# Patient Record
Sex: FEMALE | Race: BLACK OR AFRICAN AMERICAN | NOT HISPANIC OR LATINO | Employment: OTHER | URBAN - METROPOLITAN AREA
[De-identification: names, ages, dates, MRNs, and addresses within clinical notes are randomized per-mention and may not be internally consistent; named-entity substitution may affect disease eponyms.]

---

## 2017-02-24 ENCOUNTER — HOSPITAL ENCOUNTER (EMERGENCY)
Facility: HOSPITAL | Age: 57
Discharge: HOME | End: 2017-02-24
Attending: EMERGENCY MEDICINE
Payer: OTHER GOVERNMENT

## 2017-02-24 VITALS
HEART RATE: 96 BPM | BODY MASS INDEX: 32.65 KG/M2 | DIASTOLIC BLOOD PRESSURE: 68 MMHG | SYSTOLIC BLOOD PRESSURE: 133 MMHG | HEIGHT: 67 IN | OXYGEN SATURATION: 99 % | WEIGHT: 208 LBS | RESPIRATION RATE: 16 BRPM | TEMPERATURE: 99 F

## 2017-02-24 DIAGNOSIS — R00.2 PALPITATIONS: ICD-10-CM

## 2017-02-24 DIAGNOSIS — J01.90 ACUTE NON-RECURRENT SINUSITIS, UNSPECIFIED LOCATION: Primary | ICD-10-CM

## 2017-02-24 DIAGNOSIS — R05.9 COUGH: ICD-10-CM

## 2017-02-24 LAB
ALBUMIN SERPL BCP-MCNC: 3.8 G/DL
ALP SERPL-CCNC: 46 U/L
ALT SERPL W/O P-5'-P-CCNC: 32 U/L
ANION GAP SERPL CALC-SCNC: 10 MMOL/L
ANISOCYTOSIS BLD QL SMEAR: SLIGHT
AST SERPL-CCNC: 24 U/L
BASOPHILS # BLD AUTO: 0 K/UL
BASOPHILS NFR BLD: 0.1 %
BILIRUB SERPL-MCNC: 0.3 MG/DL
BNP SERPL-MCNC: 13 PG/ML
BUN SERPL-MCNC: 12 MG/DL
CALCIUM SERPL-MCNC: 8.7 MG/DL
CHLORIDE SERPL-SCNC: 107 MMOL/L
CO2 SERPL-SCNC: 21 MMOL/L
CREAT SERPL-MCNC: 0.7 MG/DL
DIFFERENTIAL METHOD: ABNORMAL
EOSINOPHIL # BLD AUTO: 0.1 K/UL
EOSINOPHIL NFR BLD: 1.8 %
ERYTHROCYTE [DISTWIDTH] IN BLOOD BY AUTOMATED COUNT: 15.2 %
EST. GFR  (AFRICAN AMERICAN): >60 ML/MIN/1.73 M^2
EST. GFR  (NON AFRICAN AMERICAN): >60 ML/MIN/1.73 M^2
GLUCOSE SERPL-MCNC: 106 MG/DL
HCT VFR BLD AUTO: 39.2 %
HGB BLD-MCNC: 12.1 G/DL
INR PPP: 1.1
LYMPHOCYTES # BLD AUTO: 0.7 K/UL
LYMPHOCYTES NFR BLD: 14.7 %
MCH RBC QN AUTO: 20 PG
MCHC RBC AUTO-ENTMCNC: 30.8 %
MCV RBC AUTO: 65 FL
MONOCYTES # BLD AUTO: 0.4 K/UL
MONOCYTES NFR BLD: 7.5 %
NEUTROPHILS # BLD AUTO: 3.5 K/UL
NEUTROPHILS NFR BLD: 75.9 %
OVALOCYTES BLD QL SMEAR: ABNORMAL
PLATELET # BLD AUTO: 200 K/UL
PLATELET BLD QL SMEAR: ABNORMAL
PMV BLD AUTO: 9.2 FL
POIKILOCYTOSIS BLD QL SMEAR: SLIGHT
POLYCHROMASIA BLD QL SMEAR: ABNORMAL
POTASSIUM SERPL-SCNC: 3.7 MMOL/L
PROT SERPL-MCNC: 6.9 G/DL
PROTHROMBIN TIME: 11.6 SEC
RBC # BLD AUTO: 6.02 M/UL
SODIUM SERPL-SCNC: 138 MMOL/L
TROPONIN I SERPL DL<=0.01 NG/ML-MCNC: 0.02 NG/ML
TSH SERPL DL<=0.005 MIU/L-ACNC: 1.08 UIU/ML
WBC # BLD AUTO: 4.7 K/UL

## 2017-02-24 PROCEDURE — 96374 THER/PROPH/DIAG INJ IV PUSH: CPT

## 2017-02-24 PROCEDURE — 36415 COLL VENOUS BLD VENIPUNCTURE: CPT

## 2017-02-24 PROCEDURE — 25000003 PHARM REV CODE 250: Performed by: EMERGENCY MEDICINE

## 2017-02-24 PROCEDURE — 84443 ASSAY THYROID STIM HORMONE: CPT

## 2017-02-24 PROCEDURE — 83880 ASSAY OF NATRIURETIC PEPTIDE: CPT

## 2017-02-24 PROCEDURE — 93005 ELECTROCARDIOGRAM TRACING: CPT

## 2017-02-24 PROCEDURE — 85025 COMPLETE CBC W/AUTO DIFF WBC: CPT

## 2017-02-24 PROCEDURE — 84484 ASSAY OF TROPONIN QUANT: CPT

## 2017-02-24 PROCEDURE — 80053 COMPREHEN METABOLIC PANEL: CPT

## 2017-02-24 PROCEDURE — 96361 HYDRATE IV INFUSION ADD-ON: CPT

## 2017-02-24 PROCEDURE — 63600175 PHARM REV CODE 636 W HCPCS: Performed by: EMERGENCY MEDICINE

## 2017-02-24 PROCEDURE — 85610 PROTHROMBIN TIME: CPT

## 2017-02-24 PROCEDURE — 99284 EMERGENCY DEPT VISIT MOD MDM: CPT | Mod: 25

## 2017-02-24 RX ORDER — ASPIRIN 81 MG/1
81 TABLET ORAL DAILY
COMMUNITY

## 2017-02-24 RX ORDER — DEXAMETHASONE SODIUM PHOSPHATE 4 MG/ML
8 INJECTION, SOLUTION INTRA-ARTICULAR; INTRALESIONAL; INTRAMUSCULAR; INTRAVENOUS; SOFT TISSUE
Status: COMPLETED | OUTPATIENT
Start: 2017-02-24 | End: 2017-02-24

## 2017-02-24 RX ORDER — AZITHROMYCIN 250 MG/1
250 TABLET, FILM COATED ORAL DAILY
Qty: 6 TABLET | Refills: 0 | Status: SHIPPED | OUTPATIENT
Start: 2017-02-24 | End: 2017-03-06

## 2017-02-24 RX ORDER — ACETAMINOPHEN 325 MG/1
650 TABLET ORAL
Status: COMPLETED | OUTPATIENT
Start: 2017-02-24 | End: 2017-02-24

## 2017-02-24 RX ORDER — IBUPROFEN 600 MG/1
600 TABLET ORAL EVERY 6 HOURS PRN
Qty: 20 TABLET | Refills: 0 | Status: SHIPPED | OUTPATIENT
Start: 2017-02-24 | End: 2017-03-24

## 2017-02-24 RX ORDER — ASPIRIN 325 MG
325 TABLET ORAL
Status: COMPLETED | OUTPATIENT
Start: 2017-02-24 | End: 2017-02-24

## 2017-02-24 RX ORDER — FLUTICASONE PROPIONATE 50 MCG
1 SPRAY, SUSPENSION (ML) NASAL 2 TIMES DAILY PRN
Qty: 15 G | Refills: 0 | Status: SHIPPED | OUTPATIENT
Start: 2017-02-24 | End: 2019-07-16

## 2017-02-24 RX ADMIN — ACETAMINOPHEN 650 MG: 325 TABLET ORAL at 07:02

## 2017-02-24 RX ADMIN — DEXAMETHASONE SODIUM PHOSPHATE 8 MG: 4 INJECTION, SOLUTION INTRAMUSCULAR; INTRAVENOUS at 07:02

## 2017-02-24 RX ADMIN — ASPIRIN 325 MG ORAL TABLET 325 MG: 325 PILL ORAL at 07:02

## 2017-02-24 RX ADMIN — SODIUM CHLORIDE 1000 ML: 0.9 INJECTION, SOLUTION INTRAVENOUS at 07:02

## 2017-02-24 NOTE — ED AVS SNAPSHOT
[facility] [address] [place] [address]               Marie Salcido   2017  6:56 PM   ED    Description:  Female : 1960   Department:  [facility]           Your Care was Coordinated By:     Provider Role From To    See Ramsay MD Attending Provider 17 1910 --      Reason for Visit     Irregular Heart Beat           Diagnoses this Visit        Comments    Acute non-recurrent sinusitis, unspecified location    -  Primary     Palpitations         Cough           ED Disposition     ED Disposition Condition Comment    Discharge             To Do List           Follow-up Information     Schedule an appointment as soon as possible for a visit with Yannick Bell MD.    Specialty:  Family Medicine    Why:  As needed    Contact information:    [address] [facility] [id number] [phone]          Follow up with [facility].    Specialty:  Emergency Medicine    Why:  If symptoms worsen    Contact information:    [address] [place] [id number] [phone]       These Medications        Disp Refills Start End    fluticasone (FLONASE) 50 mcg/actuation nasal spray 15 g 0 2017     1 spray by Each Nare route 2 (two) times daily as needed for Rhinitis. - Each Nare    Pharmacy: [facility]/pharmacy #[id number] - [place]  [address] Ph #: [phone]       azithromycin (Z-VASQUEZ) 250 MG tablet 6 tablet 0 2017 3/6/2017    Take 1 tablet (250 mg total) by mouth once daily. Take first 2 tablets together, then 1 every day until finished. - Oral    Pharmacy: [facility]/pharmacy #[id number] - [place]  [address] Ph #: [phone]       ibuprofen (ADVIL,MOTRIN) 600 MG tablet 20 tablet 0 2017     Take 1 tablet (600 mg total) by mouth every 6 (six) hours as needed for Pain. - Oral    Pharmacy: [facility]/pharmacy #[id number] - [place]  [address] Ph #: [phone]         [facility]     [facility] -  Assistance  Registered nurses in the [facility] provide clinical advisement, health education, appointment booking, and other advisory services.  Call for this free service at [phone].             Medications           Message regarding Medications     Verify the changes and/or additions to your medication regime listed below are the same as discussed with your clinician today.  If any of these changes or additions are incorrect, please notify your healthcare provider.        START taking these NEW medications        Refills    fluticasone (FLONASE) 50 mcg/actuation nasal spray 0    Si spray by Each Nare route 2 (two) times daily as needed for Rhinitis.    Class: Print    Route: Each Nare    azithromycin (Z-VASQUEZ) 250 MG tablet 0    Sig: Take 1 tablet (250 mg total) by mouth once daily. Take first 2 tablets together, then 1 every day until finished.    Class: Print    Route: Oral    ibuprofen (ADVIL,MOTRIN) 600 MG tablet 0    Sig: Take 1 tablet (600 mg total) by mouth every 6 (six) hours as needed for Pain.    Class: Print    Route: Oral      These medications were administered today        Dose Freq    aspirin tablet 325 mg 325 mg ED 1 Time    Sig: Take 1 tablet (325 mg total) by mouth ED 1 Time.    Class: Normal    Route: Oral    dexamethasone injection 8 mg 8 mg ED 1 Time    Sig: Inject 2 mLs (8 mg total) into the vein ED 1 Time.    Class: Normal    Route: Intravenous    sodium chloride 0.9% bolus 1,000 mL 1,000 mL ED 1 Time    Sig: Inject 1,000 mLs into the vein ED 1 Time.    Class: Normal    Route: Intravenous    acetaminophen tablet 650 mg 650 mg ED 1 Time    Sig: Take 2 tablets (650 mg total) by mouth ED 1 Time.    Class: Normal    Route: Oral      STOP taking these medications     cyclobenzaprine (FLEXERIL) 10 MG tablet Take 1 tablet (10 mg total) by mouth 3 (three) times daily as needed for Muscle spasms.    meclizine (ANTIVERT) 50 MG  tablet Take 1 tablet (50 mg total) by mouth 3 (three) times daily as needed for Dizziness.           Verify that the below list of medications is an accurate representation of the medications you are currently taking.  If none reported, the list may be blank. If incorrect, please contact your healthcare provider. Carry this list with you in case of emergency.           Current Medications     aspirin (ECOTRIN) 81 MG EC tablet Take 81 mg by mouth once daily.    aspirin tablet 325 mg Take 1 tablet (325 mg total) by mouth ED 1 Time.    atorvastatin (LIPITOR) 20 MG tablet Take 1 tablet (20 mg total) by mouth once daily.    azithromycin (Z-VASQUEZ) 250 MG tablet Take 1 tablet (250 mg total) by mouth once daily. Take first 2 tablets together, then 1 every day until finished.    fluticasone (FLONASE) 50 mcg/actuation nasal spray 1 spray by Each Nare route 2 (two) times daily as needed for Rhinitis.    ibuprofen (ADVIL,MOTRIN) 600 MG tablet Take 1 tablet (600 mg total) by mouth every 6 (six) hours as needed for Pain.           Clinical Reference Information           Your Vitals Were     BP                   164/86           Allergies as of 2/24/2017        Reactions    Codeine Rash      Immunizations Administered on Date of Encounter - 2/24/2017     None      ED Micro, Lab, POCT     Start Ordered       Status Ordering Provider    02/24/17 1912 02/24/17 1911  TSH  STAT      Final result     02/24/17 1911 02/24/17 1911  CBC auto differential  STAT      Final result     02/24/17 1911 02/24/17 1911  Comprehensive metabolic panel  STAT      Final result     02/24/17 1911 02/24/17 1911  Protime-INR  STAT      Final result     02/24/17 1911 02/24/17 1911    Now then every 3 hours,   Status:  Canceled     Comments:  PLEASE REVIEW ORDER START TIME BEFORE MARKING SPECIMEN COLLECTED.   Start Status   02/24/17 1911 Final result       Canceled     02/24/17 1911 02/24/17 1911  B-Type natriuretic peptide (BNP)  STAT      Final result        ED Imaging Orders     Start Ordered       Status Ordering Provider    02/24/17 1911 02/24/17 1911  X-Ray Chest PA And Lateral  1 time imaging      In process         Discharge Instructions         Acute Bacterial Rhinosinusitis (ABRS)  Acute bacterial rhinosinusitis (ABRS) is an infection of your nasal cavity and sinuses. Its caused by bacteria. Acute means that youve had symptoms for less than 12 weeks.  Understanding your sinuses  The nasal cavity is the large air-filled space behind your nose. The sinuses are a group of spaces formed by the bones of your face. They connect with your nasal cavity. ABRS causes the tissue lining these spaces to become inflamed. Mucus may not drain normally. This leads to facial pain and other symptoms.  What causes ABRS?  ABRS most often follows an upper respiratory infection caused by a virus. Bacteria then infect the lining of your nasal cavity and sinuses. But you can also get ABRS if you have:  · Nasal allergies  · Long-term nasal swelling and congestion not caused by allergies  · Blockage in the nose  Symptoms of ABRS  The symptoms of ABRS may be different for each person, and can include:  · Nasal congestion  · Runny nose  · Fluid draining from the nose down the throat (postnasal drip)  · Headache  · Cough  · Pain in the sinuses  · Thick, colored fluid from the nose (mucus)  · Fever  Diagnosing ABRS  ABRS may be diagnosed if youve had an upper respiratory infection like a cold and cough for longer than 10 to 14 days. Your health care provider will ask about your symptoms and your medical history. The provider will check your vital signs, including your temperature. Youll have a physical exam. The health care provider will check your ears, nose, and throat. You likely wont need any tests. If ABRS comes back, you may have a culture or other tests.  Treatment for ABRS  Treatment may include:  · Antibiotic medicine. This is for symptoms that last for at least 10 to 14  days.  · Nasal corticosteroid medicine. Drops or spray used in the nose can lessen swelling and congestion.  · Over-the-counter pain medicine. This is to lessen sinus pain and pressure.  · Nasal decongestant medicine. Spray or drops may help to lessen congestion. Do not use them for more than a few days.  · Salt wash (saline irrigation). This can help to loosen mucus.  Possible complications of ABRS  ABRS may come back or become long-term (chronic).  In rare cases, ABRS may cause complications such as:   · Inflamed tissue around the brain and spinal cord (meningitis)  · Inflamed tissue around the eyes (orbital cellulitis)  · Inflamed bones around the sinuses (osteitis)  These problems may need to be treated in a hospital with intravenous (IV) antibiotic medicine or surgery.  When to call the health care provider  Call your health care provider if you have any of the following:  · Symptoms that dont get better, or get worse  · Symptoms that dont get better after 3 to 5 days on antibiotics  · Trouble seeing  · Swelling around your eyes  · Confusion or trouble staying awake   Date Last Reviewed: 3/3/2015  © 7574-6956 [employer]. [address] [place] [address]. All rights reserved. This information is not intended as a substitute for professional medical care. Always follow your healthcare professional's instructions.          Discharge References/Attachments     PALPITATIONS (ENGLISH)      Smoking Cessation     If you would like to quit smoking:   You may be eligible for free services if you are a [place] resident and started smoking cigarettes before September 1, 1988.  Call the Smoking Cessation Trust (SCT) toll free at (764) 121-6588 or (454) 396-5295.   Call 4-800-QUIT-NOW if you do not meet the above criteria.             [facility] complies with applicable Federal civil rights laws and does not discriminate on the basis of race, color, national origin, age,  disability, or sex.        Language Assistance Services     ATTENTION: Language assistance services are available, free of charge. Please call [phone].      ATENCIÓN: Si habla paigeañol, tiene a parker disposición servicios gratuitos de asistencia lingüística. Llame al [phone].     CHÚ Ý: N?u b?n nói Ti?ng Vi?t, có các d?ch v? h? tr? ngôn ng? mi?n phí dành cho b?n. G?i s? [phone].

## 2017-02-25 NOTE — ED NOTES
Pt continues with condition unchanged. Continuously wiping nose, lots of nasal congestion. Face hurts from pressure

## 2017-02-25 NOTE — ED NOTES
Pt complains of strange feeling in her chest. Heart feels like it is fluttering and flipping. Slight shortness of breath. Having lots of nasal and head congestion. Has been sick for about 3 days now. Heart started feeling funny today. Denies chest pain. Pt on vitals and cardiac monitor. Neuro intact. No noted cyanosis. Alert and oriented. NSR on monitor. Occasional PVC noted.

## 2017-02-25 NOTE — ED PROVIDER NOTES
"Encounter Date: 2/24/2017    SCRIBE #1 NOTE: I, Joya Moran, am scribing for, and in the presence of, Dr Ramsay.       History     Chief Complaint   Patient presents with    Irregular Heart Beat     states it feels like "heart is flipping around in chest" with tightness; sinus congestion     Review of patient's allergies indicates:   Allergen Reactions    Codeine Rash     HPI Comments: 02/24/2017  7:36 PM     Chief Complaint: Irregular Heart Beat      Marie Salcido is a 56 y.o. female with a pmhx of Anemia; Coronary artery disease; Silent myocardial infarction; and Sleep apnea presenting to the E.D. with an acute onset of palpitations which began tonight. She describes her heart "flipping around in my chest." One week ago she began experiencing congestion, chills, fatigue, myalgias, and cough. No exacerbating or alleviating factors. Denies fever. Pt has a past surgical history that includes cyst removed from right breast; Tubal ligation; Laser laparoscopy; and Coronary artery bypass graft (10/4/13).\      The history is provided by the patient.     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 [facility]rino [facility]    cyst removed from right breast      LASER LAPAROSCOPY      TUBAL LIGATION       Family History   Problem Relation Age of Onset    Cancer Maternal Grandmother     Breast cancer Sister     Asthma Sister     Arrhythmia Mother     Diabetes Mellitus Neg Hx      Social History   Substance Use Topics    Smoking status: Current Every Day Smoker    Smokeless tobacco: None    Alcohol use 0.6 oz/week     1 Glasses of wine per week     Review of Systems   Constitutional: Positive for chills and fatigue. Negative for " fever.   HENT: Positive for congestion. Negative for sore throat.    Eyes: Negative for visual disturbance.   Respiratory: Positive for cough.    Cardiovascular: Positive for palpitations. Negative for chest pain.   Gastrointestinal: Negative for abdominal pain, diarrhea, nausea and vomiting.   Genitourinary: Negative for difficulty urinating and pelvic pain.   Musculoskeletal: Positive for myalgias. Negative for arthralgias.   Skin: Negative for rash.   Neurological: Negative for weakness.       Physical Exam   Initial Vitals   BP Pulse Resp Temp SpO2   02/24/17 1852 02/24/17 1852 02/24/17 1852 02/24/17 1852 02/24/17 1852   159/91 113 16 99 °F (37.2 °C) 98 %     Physical Exam    Nursing note and vitals reviewed.  Constitutional: She appears well-developed.   HENT:   Head: Normocephalic and atraumatic.   Nose: Left sinus exhibits maxillary sinus tenderness.   Mouth/Throat: Oropharynx is clear and moist.   Eyes: Conjunctivae are normal. Scleral icterus is present.   Neck: Neck supple.   Cardiovascular: Regular rhythm, normal heart sounds and intact distal pulses. Tachycardia present.  Exam reveals no gallop and no friction rub.    No murmur heard.  Pulmonary/Chest: Breath sounds normal. She has no wheezes. She has no rhonchi. She has no rales.   Abdominal: Soft. She exhibits no distension. There is no tenderness.   Musculoskeletal: Normal range of motion.   Neurological: She is alert and oriented to person, place, and time.   Skin: No rash noted. No erythema.   Psychiatric: She has a normal mood and affect.         ED Course   Procedures  Labs Reviewed   CBC W/ AUTO DIFFERENTIAL - Abnormal; Notable for the following:        Result Value    RBC 6.02 (*)     MCV 65 (*)     MCH 20.0 (*)     MCHC 30.8 (*)     RDW 15.2 (*)     Lymph # 0.7 (*)     Gran% 75.9 (*)     Lymph% 14.7 (*)     All other components within normal limits    Narrative:     PLEASE REVIEW ORDER START TIME BEFORE MARKING SPECIMEN  COLLECTED.    COMPREHENSIVE METABOLIC PANEL - Abnormal; Notable for the following:     CO2 21 (*)     Alkaline Phosphatase 46 (*)     All other components within normal limits    Narrative:     PLEASE REVIEW ORDER START TIME BEFORE MARKING SPECIMEN  COLLECTED.   PROTIME-INR    Narrative:     PLEASE REVIEW ORDER START TIME BEFORE MARKING SPECIMEN  COLLECTED.   TROPONIN I    Narrative:     PLEASE REVIEW ORDER START TIME BEFORE MARKING SPECIMEN  COLLECTED.   B-TYPE NATRIURETIC PEPTIDE    Narrative:     PLEASE REVIEW ORDER START TIME BEFORE MARKING SPECIMEN  COLLECTED.   TSH    Narrative:     PLEASE REVIEW ORDER START TIME BEFORE MARKING SPECIMEN  COLLECTED.     EKG Readings: (Independently Interpreted)   Rhythm: Sinus Tachycardia. Heart Rate: 102.   EKG: sinus tachycardia at 102 bpm, nl axis, nl intervals, no hypertrophy, no ST-T changes as read by me.        X-Rays:   Independently Interpreted Readings:   Chest X-Ray: No acute abnormalities. Prior sternotomy.     Medical Decision Making:   History:   Old Medical Records: I decided to obtain old medical records.  Initial Assessment:   56-year-old woman who presents for evaluation of sinus pressure, postnasal drip, cough with associated chest pressure and general malaise and body aches.  Afebrile and nontoxic appearing with mild sinus tenderness to palpation.  No pharyngeal erythema or exudate.  Mild tachycardia on examination initially which improved with IV fluids.  She had no grave electrolyte abnormalities or evidence of dehydration significantly.  BNP negative.  TSH and troponin normal.  Out ACS, thyroid storm.  She seems to have a upper respiratory infection concerning for sinusitis.  EKG showed no evidence of significant arrhythmias.  Informed her she should follow up with her PCP for Holter monitor.  Return precautions discussed.  She is discharged improved in no acute distress with a prescription for azithromycin and Flonase.            Scribe Attestation:   Scribe #1: I  performed the above scribed service and the documentation accurately describes the services I performed. I attest to the accuracy of the note.    Attending Attestation:           Physician Attestation for Scribe:  Physician Attestation Statement for Scribe #1: I, Dr Ramsay, reviewed documentation, as scribed by Joya Moran in my presence, and it is both accurate and complete.                 ED Course     Clinical Impression:   The primary encounter diagnosis was Acute non-recurrent sinusitis, unspecified location. Diagnoses of Palpitations and Cough were also pertinent to this visit.          See Ramsay MD  02/24/17 2035

## 2017-02-25 NOTE — DISCHARGE INSTRUCTIONS

## 2017-03-21 ENCOUNTER — HOSPITAL ENCOUNTER (EMERGENCY)
Facility: HOSPITAL | Age: 57
Discharge: OTHER INSTITUTION | End: 2017-03-21
Attending: EMERGENCY MEDICINE
Payer: OTHER GOVERNMENT

## 2017-03-21 VITALS
TEMPERATURE: 98 F | RESPIRATION RATE: 16 BRPM | HEIGHT: 67 IN | SYSTOLIC BLOOD PRESSURE: 146 MMHG | DIASTOLIC BLOOD PRESSURE: 85 MMHG | BODY MASS INDEX: 33.43 KG/M2 | OXYGEN SATURATION: 100 % | WEIGHT: 213 LBS | HEART RATE: 60 BPM

## 2017-03-21 DIAGNOSIS — R07.9 CHEST PAIN, UNSPECIFIED TYPE: Primary | ICD-10-CM

## 2017-03-21 PROBLEM — I20.0 UNSTABLE ANGINA: Status: ACTIVE | Noted: 2017-03-21

## 2017-03-21 PROBLEM — I20.0 UNSTABLE ANGINA: Status: RESOLVED | Noted: 2017-03-21 | Resolved: 2017-03-21

## 2017-03-21 PROBLEM — Z72.0 TOBACCO USE: Status: ACTIVE | Noted: 2017-03-21

## 2017-03-21 LAB
ANION GAP SERPL CALC-SCNC: 8 MMOL/L
BASOPHILS # BLD AUTO: 0.3 K/UL
BASOPHILS NFR BLD: 5.6 %
BUN SERPL-MCNC: 13 MG/DL
CALCIUM SERPL-MCNC: 8.5 MG/DL
CHLORIDE SERPL-SCNC: 108 MMOL/L
CO2 SERPL-SCNC: 22 MMOL/L
CREAT SERPL-MCNC: 0.7 MG/DL
DIFFERENTIAL METHOD: ABNORMAL
EOSINOPHIL # BLD AUTO: 0.1 K/UL
EOSINOPHIL NFR BLD: 2.1 %
ERYTHROCYTE [DISTWIDTH] IN BLOOD BY AUTOMATED COUNT: 14.9 %
EST. GFR  (AFRICAN AMERICAN): >60 ML/MIN/1.73 M^2
EST. GFR  (NON AFRICAN AMERICAN): >60 ML/MIN/1.73 M^2
GLUCOSE SERPL-MCNC: 92 MG/DL
HCT VFR BLD AUTO: 39.9 %
HGB BLD-MCNC: 12.2 G/DL
LYMPHOCYTES # BLD AUTO: 2 K/UL
LYMPHOCYTES NFR BLD: 38.5 %
MCH RBC QN AUTO: 19.8 PG
MCHC RBC AUTO-ENTMCNC: 30.6 %
MCV RBC AUTO: 65 FL
MONOCYTES # BLD AUTO: 0.3 K/UL
MONOCYTES NFR BLD: 6.1 %
NEUTROPHILS # BLD AUTO: 2.5 K/UL
NEUTROPHILS NFR BLD: 47.7 %
PLATELET # BLD AUTO: 279 K/UL
PMV BLD AUTO: 9.3 FL
POTASSIUM SERPL-SCNC: 4.3 MMOL/L
RBC # BLD AUTO: 6.17 M/UL
SODIUM SERPL-SCNC: 138 MMOL/L
TROPONIN I SERPL DL<=0.01 NG/ML-MCNC: 0.02 NG/ML
WBC # BLD AUTO: 5.3 K/UL

## 2017-03-21 PROCEDURE — 84484 ASSAY OF TROPONIN QUANT: CPT

## 2017-03-21 PROCEDURE — 63600175 PHARM REV CODE 636 W HCPCS: Performed by: EMERGENCY MEDICINE

## 2017-03-21 PROCEDURE — 25000003 PHARM REV CODE 250: Performed by: EMERGENCY MEDICINE

## 2017-03-21 PROCEDURE — 99285 EMERGENCY DEPT VISIT HI MDM: CPT | Mod: 25

## 2017-03-21 PROCEDURE — 36415 COLL VENOUS BLD VENIPUNCTURE: CPT

## 2017-03-21 PROCEDURE — 85025 COMPLETE CBC W/AUTO DIFF WBC: CPT

## 2017-03-21 PROCEDURE — 96374 THER/PROPH/DIAG INJ IV PUSH: CPT

## 2017-03-21 PROCEDURE — 96375 TX/PRO/DX INJ NEW DRUG ADDON: CPT

## 2017-03-21 PROCEDURE — 96376 TX/PRO/DX INJ SAME DRUG ADON: CPT

## 2017-03-21 PROCEDURE — 80048 BASIC METABOLIC PNL TOTAL CA: CPT

## 2017-03-21 RX ORDER — ONDANSETRON 2 MG/ML
4 INJECTION INTRAMUSCULAR; INTRAVENOUS
Status: COMPLETED | OUTPATIENT
Start: 2017-03-21 | End: 2017-03-21

## 2017-03-21 RX ORDER — MORPHINE SULFATE 2 MG/ML
7 INJECTION, SOLUTION INTRAMUSCULAR; INTRAVENOUS
Status: COMPLETED | OUTPATIENT
Start: 2017-03-21 | End: 2017-03-21

## 2017-03-21 RX ORDER — ASPIRIN 325 MG
325 TABLET ORAL
Status: COMPLETED | OUTPATIENT
Start: 2017-03-21 | End: 2017-03-21

## 2017-03-21 RX ORDER — MORPHINE SULFATE 2 MG/ML
6 INJECTION, SOLUTION INTRAMUSCULAR; INTRAVENOUS
Status: COMPLETED | OUTPATIENT
Start: 2017-03-21 | End: 2017-03-21

## 2017-03-21 RX ADMIN — ONDANSETRON 4 MG: 2 INJECTION INTRAMUSCULAR; INTRAVENOUS at 07:03

## 2017-03-21 RX ADMIN — ASPIRIN 325 MG ORAL TABLET 325 MG: 325 PILL ORAL at 06:03

## 2017-03-21 RX ADMIN — MORPHINE SULFATE 7 MG: 2 INJECTION, SOLUTION INTRAMUSCULAR; INTRAVENOUS at 05:03

## 2017-03-21 RX ADMIN — MORPHINE SULFATE 6 MG: 2 INJECTION, SOLUTION INTRAMUSCULAR; INTRAVENOUS at 06:03

## 2017-03-21 NOTE — ED PROVIDER NOTES
Chief complaint:  Breast pain (Pain under Rt breast )      HPI:  Marie Salcido is a 56 y.o. female presenting with right-sided chest pain present for slightly less than 24 hours.  It is constant in nature and worsening.  It is provoked by certain movements as well as deep inspiration.  She denies any dyspnea.  No associated diaphoresis, nausea, or vomiting.  No radiation or migration of pain.  She denies injury.  She notably does has history of coronary artery disease with prior CABG x 2.    ROS: As per HPI and below:  No headache, neck pain, syncope, hemoptysis, fever, cough, abdominal pain, vomiting, diarrhea, rashes, swelling, visual changes, numbness, weakness. The patient/family denies blurry vision, dysphagia, dysuria, joint swelling, easy bruising.      Review of patient's allergies indicates:   Allergen Reactions    Codeine Rash       Patient's Medications   New Prescriptions    No medications on file   Previous Medications    ASPIRIN (ECOTRIN) 81 MG EC TABLET    Take 81 mg by mouth once daily.    FLUTICASONE (FLONASE) 50 MCG/ACTUATION NASAL SPRAY    1 spray by Each Nare route 2 (two) times daily as needed for Rhinitis.    IBUPROFEN (ADVIL,MOTRIN) 600 MG TABLET    Take 1 tablet (600 mg total) by mouth every 6 (six) hours as needed for Pain.   Modified Medications    No medications on file   Discontinued Medications    ATORVASTATIN (LIPITOR) 20 MG TABLET    Take 1 tablet (20 mg total) by mouth once daily.       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 [facility]    cyst removed from right breast      LASER LAPAROSCOPY      TUBAL LIGATION         Social History     Social History     Marital status:      Spouse name: N/A    Number of children: N/A    Years of education: N/A     Social History Main Topics    Smoking status: Current Every Day Smoker    Smokeless tobacco: None    Alcohol use 0.6 oz/week     1 Glasses of wine per week    Drug use: No    Sexual activity: Yes     Partners: Male     Other Topics Concern    None     Social History Narrative       Family History   Problem Relation Age of Onset    Cancer Maternal Grandmother     Breast cancer Sister     Asthma Sister     Arrhythmia Mother     Diabetes Mellitus Neg Hx        Physical Exam:    Vitals:    03/21/17 0447   BP: (!) 147/72   Pulse: 71   Resp: 16   Temp: 98.3 °F (36.8 °C)     GENERAL:  No apparent distress.  Alert.    HEENT:  Moist mucous membranes.  Normocephalic and atraumatic.    NECK:  No swelling.  Midline trachea.   CARDIOVASCULAR:  Regular rate and rhythm.  2+ radial pulses.  No murmurs auscultated.  Chest wall pain reproducible with patient twisting of the torso.  No palpable tenderness to palpation or crepitus.  PULMONARY:  Lungs clear to auscultation bilaterally.  No wheezes, rales, or rhonci.    ABDOMEN:  Non-tender and non-distended.    EXTREMITIES:  Warm and well perfused.  Brisk capillary refill.  Symmetric and nontender.  No peripheral edema.  2+ DP and PT pulses.  NEUROLOGICAL:  Normal mental status.  Appropriate and conversant.    SKIN:  No rashes or ecchymoses.    BACK:  Atraumatic.  No CVA tenderness to palpation.      Labs Reviewed   CBC W/ AUTO DIFFERENTIAL   BASIC METABOLIC PANEL   TROPONIN I       Current Discharge Medication List      CONTINUE these medications which have NOT CHANGED    Details   aspirin (ECOTRIN) 81 MG EC tablet Take 81 mg by mouth once daily.      fluticasone (FLONASE) 50 mcg/actuation nasal spray 1 spray by Each Nare route 2 (two) times daily as needed for Rhinitis.  Qty: 15 g, Refills: 0      ibuprofen (ADVIL,MOTRIN) 600 MG tablet Take 1 tablet (600 mg total) by  mouth every 6 (six) hours as needed for Pain.  Qty: 20 tablet, Refills: 0         STOP taking these medications       atorvastatin (LIPITOR) 20 MG tablet Comments:   Reason for Stopping:               Orders Placed This Encounter   Procedures    X-Ray Chest PA And Lateral    CBC auto differential    Basic metabolic panel    Troponin I    Cardiac Monitoring - Adult    EKG 12-LEAD    Insert peripheral IV       Imaging Results     None          ED Course   Comment By Time   EKG:  Sinus bradycardia, rate of 59, normal intervals and axis.  Old T-wave inversions in V1/2 compared to prior Oct 2013.  There are no acute ST or T wave changes suggestive of acute ischemia or infarction. Jakob Greer MD 03/21 7609   Discussed with Dr. Tyson who recommends transfer for further workup given prior hx CABG.  Call placed to transfer center. Jakob Greer MD 03/21 0609   CXR:  NAD. (my read) Jakob Greer MD 03/21 6212         MDM:    56 y.o. female with atypical chest pain right-sided in nature.  Further workup was sent for risk stratification of ACS.  Patient without tachycardia or hypoxia and I doubt PE.  EKG nonischemic with further cardiac biomarkers for risk stratification sent.  Very low suspicion for aortic dissection.  Morphine IV given for pain control.  Cardiac biomarkers negative.  Patient awaiting txf accepted by hospital medicine at [facility].      Diagnoses:    1. Chest pain     Jakob Greer MD  03/21/17 0714

## 2017-03-21 NOTE — ED NOTES
#[phone]: Report called to BALDEMAR Bowles (Med-Surg @ [facility]) re: pending tx to their facility (direct admit to # [id number]).

## 2017-03-21 NOTE — ED NOTES
Patient identifiers for Marie Salcdio checked and correct.  LOC: Patient is awake, alert, and aware of environment with an appropriate affect. Patient is oriented x 3 and speaking appropriately.  APPEARANCE: Patient resting comfortably and in no acute distress. Patient is clean and well groomed, patient's clothing is properly fastened.  SKIN: The skin is warm and dry. Patient has normal skin turgor and moist mucus membrances. Skin is intact; no bruising or breakdown noted.  MUSCULOSKELETAL: Patient is moving all extremities well, no obvious deformities noted. Pulses intact.   RESPIRATORY: Airway is open and patent. Respirations are spontaneous and non-labored with normal effort and rate.  CARDIAC: Patient has a normal rate and rhythm. No peripheral edema noted. Capillary refill < 3 seconds. Right under breast pain onset yesterday, non radiating.   ABDOMEN: No distention noted. Bowel sounds active in all 4 quadrants. Soft and non-tender upon palpation.  NEUROLOGICAL: PERRL. Facial expression is symmetrical. Hand grasps are equal bilaterally. Normal sensation in all extremities when touched with finger.  Allergies reported:   Review of patient's allergies indicates:   Allergen Reactions    Codeine Rash

## 2017-03-22 PROBLEM — I30.0 IDIOPATHIC PERICARDITIS: Status: ACTIVE | Noted: 2017-03-22

## 2017-03-23 PROBLEM — K76.0 FATTY LIVER: Status: ACTIVE | Noted: 2017-03-23

## 2017-05-02 ENCOUNTER — OFFICE VISIT (OUTPATIENT)
Dept: CARDIOTHORACIC SURGERY | Facility: CLINIC | Age: 57
End: 2017-05-02
Payer: OTHER GOVERNMENT

## 2017-05-02 VITALS
HEIGHT: 67 IN | OXYGEN SATURATION: 100 % | HEART RATE: 64 BPM | SYSTOLIC BLOOD PRESSURE: 144 MMHG | DIASTOLIC BLOOD PRESSURE: 92 MMHG | WEIGHT: 209.63 LBS | BODY MASS INDEX: 32.9 KG/M2 | TEMPERATURE: 98 F

## 2017-05-02 DIAGNOSIS — R07.89 OTHER CHEST PAIN: Primary | ICD-10-CM

## 2017-05-02 PROCEDURE — 99999 PR PBB SHADOW E&M-EST. PATIENT-LVL III: CPT | Mod: PBBFAC,,, | Performed by: THORACIC SURGERY (CARDIOTHORACIC VASCULAR SURGERY)

## 2017-05-02 PROCEDURE — 99213 OFFICE O/P EST LOW 20 MIN: CPT | Mod: PBBFAC | Performed by: THORACIC SURGERY (CARDIOTHORACIC VASCULAR SURGERY)

## 2017-05-02 PROCEDURE — 99205 OFFICE O/P NEW HI 60 MIN: CPT | Mod: S$PBB,,, | Performed by: THORACIC SURGERY (CARDIOTHORACIC VASCULAR SURGERY)

## 2017-05-02 RX ORDER — LISINOPRIL 10 MG/1
10 TABLET ORAL DAILY
Status: ON HOLD | COMMUNITY
End: 2019-06-03 | Stop reason: SDUPTHER

## 2017-05-02 RX ORDER — IBUPROFEN 600 MG/1
600 TABLET ORAL 3 TIMES DAILY
Status: ON HOLD | COMMUNITY
End: 2019-06-03 | Stop reason: SDUPTHER

## 2017-05-02 RX ORDER — TRAZODONE HYDROCHLORIDE 100 MG/1
100 TABLET ORAL NIGHTLY
COMMUNITY
End: 2019-07-16

## 2017-05-02 RX ORDER — MECLIZINE HYDROCHLORIDE CHEWABLE TABLETS 25 MG/1
32 TABLET, CHEWABLE ORAL 3 TIMES DAILY PRN
COMMUNITY
End: 2019-07-16

## 2017-05-02 NOTE — LETTER
May 2, 2017      Pallavi Sunkara, MD  [address]  [facility] [address]           [facility]  [address]  [facility] [address]  Phone: [phone]          Patient: Marie Salcido   MR Number: [id number]   YOB: 1960   Date of Visit: 5/2/2017       Dear Dr. Pallavi Sunkara:    Thank you for referring Marie Salcido to me for evaluation. Attached you will find relevant portions of my assessment and plan of care.    If you have questions, please do not hesitate to call me. I look forward to following Marie Salcido along with you.    Sincerely,    Donnie Milligan MD    Enclosure  CC:  No Recipients    If you would like to receive this communication electronically, please contact [email or web address] or [phone] to request more information on [employer] Link access.    For providers and/or their staff who would like to refer a patient to [facility], please contact us through our one-stop-shop provider referral line, [facility] , at [phone].    If you feel you have received this communication in error or would no longer like to receive these types of communications, please e-mail [email or web address]

## 2017-05-02 NOTE — MR AVS SNAPSHOT
[facility]  [address]  [place] [address]  Phone: [phone]                  Marie Salcido   2017 10:00 AM   Appointment    Description:  Female : 1960   Provider:  Luke Posadas MD   Department:  [facility]                To Do List           Future Appointments        Provider Department Dept Phone    2017 10:00 AM Luke Posadas MD [facility] [phone]      Goals (5 Years of Data)     None      [facility]     [facility] -  Assistance  Unless otherwise directed by your provider, please contact [facility], our nurse care line that is available for  assistance.     Registered nurses in the [facility] Center provide: appointment scheduling, clinical advisement, health education, and other advisory services.  Call: [phone] (toll free)               Medications           Message regarding Medications     Verify the changes and/or additions to your medication regime listed below are the same as discussed with your clinician today.  If any of these changes or additions are incorrect, please notify your healthcare provider.             Verify that the below list of medications is an accurate representation of the medications you are currently taking.  If none reported, the list may be blank. If incorrect, please contact your healthcare provider. Carry this list with you in case of emergency.           Current Medications     aspirin (ECOTRIN) 81 MG EC tablet Take 81 mg by mouth once daily.    atorvastatin (LIPITOR) 40 MG tablet Take 1 tablet (40 mg total) by mouth once daily.    famotidine (PEPCID) 20 MG tablet Take 1 tablet (20 mg total) by mouth 2 (two) times daily.    fluticasone (FLONASE) 50 mcg/actuation nasal spray 1 spray by Each Nare route 2 (two) times daily as needed for Rhinitis.    hydrochlorothiazide (HYDRODIURIL) 25 MG tablet Take 1 tablet (25  mg total) by mouth once daily.           Clinical Reference Information           Your Vitals Were     Last Period                   10/04/2013           Allergies as of 5/2/2017     Codeine      Immunizations Administered on Date of Encounter - 5/2/2017     None      Smoking Cessation     If you would like to quit smoking:   You may be eligible for free services if you are a [place] resident and started smoking cigarettes before September 1, 1988.  Call the Smoking Cessation Trust ([facility]) toll free at ([phone] or [phone].   Call 1-800-QUIT-NOW if you do not meet the above criteria.   Contact us via email: [email or web address]   View our website for more information: [email or web address]        Language Assistance Services     ATTENTION: Language assistance services are available, free of charge. Please call [phone].      ATENCIÓN: Si karo sol, tiene a parker disposición servicios gratuitos de asistencia lingüística. Llame al [phone].     CHÚ Ý: N?u b?n nói Ti?ng Vi?t, có các d?ch v? h? tr? ngôn ng? mi?n phí dành cho b?n. G?i s? [phone].         [facility] complies with applicable Federal civil rights laws and does not discriminate on the basis of race, color, national origin, age, disability, or sex.

## 2017-05-02 NOTE — PROGRESS NOTES
Cardiothoracic Surgery Clinic Note - H and P    Subjective:     Procedures:      Marie Salcido is a 56 y.o. 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 Troponin negative. Echo preserved EF    Not smoking. Occasionally reports shortness of breath with exertion. Non-smoker.     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 Par[facility]    cyst removed from right breast      LASER LAPAROSCOPY      TUBAL LIGATION         Social History     Social History    Marital status:      Spouse name: N/A    Number of children: N/A    Years of education: N/A     Occupational History    Not on file.     Social History Main Topics    Smoking status: Current Every Day Smoker    Smokeless tobacco: Not on file    Alcohol use 0.6 oz/week     1 Glasses of wine per week    Drug use: No    Sexual activity: Yes     Partners: Male     Other Topics Concern    Not on file     Social History Narrative       Review of patient's allergies indicates:   Allergen Reactions    Codeine Rash         Objective:     PHYSICAL EXAM:  Vital Signs (Most Recent)       Review of Systems   Constitutional: Positive for malaise/fatigue. Negative for chills, fever and weight loss.   HENT: Negative.    Eyes: Negative.    Respiratory: Positive for shortness of breath. Negative for cough.    Cardiovascular: Positive for chest pain. Negative for palpitations and claudication.   Gastrointestinal: Negative.    Genitourinary:  Negative.    Musculoskeletal: Negative.    Skin: Negative.        Physical Exam:  General: NAD   Neuro: aaox4, perrl  Respiratory: resps even unlabored  Cardiac: cap refill <2 sec, RRR  Well healed previous sternotomy incision  Abdomen: Normal, benign.  Extremities: Warm dry and intact    Pathology- reviewed  Specimens     None            Assessment:     56 y.o. female with CAD s/p CABGx2 (LIMA-LAD, SVG-OM 2, due to anomalous left main w/ origin on right aortic cusp) in 2013 with Dr Posadas who developed an episode of acute chest pain in March 2017. Work up with [facility] showed atretic LIMA-LAD and close to 100% blockage of pLAD  Patient is a [employer]    Plan:     - No indications for surgical revascularization -- LCx is bypassed, LIMA-LAD is atretic but left main is open    Donnie Milligan MD   [facility]                  CTS Attending Note:    I have personally taken the history and examined this patient and agree with the resident's note as stated above. 55 yo F with hx CABG 2 for anomalous LM. Unroofing would have been a better option, but vessel diverged from aortic wall early and the intramural component was short, allowing for compression even with unroof. As a result, cabg done. SVG is patent and distal to a lesion, but LIMA is atretic given competitive flow through the native vessels. There is no role for redo CABG or PCI. If pt has recurrent chest pain (has only had one episode), then would recommend PET to eval for ischemia.

## 2018-08-24 ENCOUNTER — HOSPITAL ENCOUNTER (EMERGENCY)
Facility: HOSPITAL | Age: 58
Discharge: HOME | End: 2018-08-24
Attending: EMERGENCY MEDICINE
Payer: OTHER GOVERNMENT

## 2018-08-24 VITALS
SYSTOLIC BLOOD PRESSURE: 141 MMHG | HEIGHT: 67 IN | OXYGEN SATURATION: 100 % | BODY MASS INDEX: 32.65 KG/M2 | WEIGHT: 208 LBS | TEMPERATURE: 98 F | RESPIRATION RATE: 18 BRPM | DIASTOLIC BLOOD PRESSURE: 66 MMHG | HEART RATE: 73 BPM

## 2018-08-24 DIAGNOSIS — M54.16 LUMBAR RADICULOPATHY: Primary | ICD-10-CM

## 2018-08-24 PROCEDURE — 96372 THER/PROPH/DIAG INJ SC/IM: CPT

## 2018-08-24 PROCEDURE — 63600175 PHARM REV CODE 636 W HCPCS: Performed by: NURSE PRACTITIONER

## 2018-08-24 PROCEDURE — 99283 EMERGENCY DEPT VISIT LOW MDM: CPT | Mod: 25

## 2018-08-24 RX ORDER — PREDNISONE 20 MG/1
40 TABLET ORAL DAILY
Qty: 8 TABLET | Refills: 0 | Status: SHIPPED | OUTPATIENT
Start: 2018-08-24 | End: 2018-08-28

## 2018-08-24 RX ORDER — DICLOFENAC SODIUM 50 MG/1
50 TABLET, DELAYED RELEASE ORAL 3 TIMES DAILY PRN
Qty: 30 TABLET | Refills: 0 | Status: SHIPPED | OUTPATIENT
Start: 2018-08-24 | End: 2019-07-16

## 2018-08-24 RX ORDER — KETOROLAC TROMETHAMINE 30 MG/ML
60 INJECTION, SOLUTION INTRAMUSCULAR; INTRAVENOUS
Status: COMPLETED | OUTPATIENT
Start: 2018-08-24 | End: 2018-08-24

## 2018-08-24 RX ORDER — METHOCARBAMOL 750 MG/1
1500 TABLET, FILM COATED ORAL 3 TIMES DAILY PRN
Qty: 30 TABLET | Refills: 0 | Status: SHIPPED | OUTPATIENT
Start: 2018-08-24 | End: 2018-08-29

## 2018-08-24 RX ADMIN — KETOROLAC TROMETHAMINE 60 MG: 30 INJECTION, SOLUTION INTRAMUSCULAR at 11:08

## 2018-08-24 NOTE — ED PROVIDER NOTES
"Encounter Date: 8/24/2018    SCRIBE #1 NOTE: I, Hailee Isaacs, am scribing for, and in the presence of, TORSTEN Kirby.       History     Chief Complaint   Patient presents with    Back Pain     left side and radiates down leg. x 2 days.       Time seen by provider: 11:16 AM on 08/24/2018    Marie Salcido is a 57 y.o. female with chronic back pain who presents to the ED with an onset of left lower back pain. She states that the pain radiates down the back of her left leg to her calf. The patient's pain is not described as a burning or a muscle sensation but is worsened with sitting. She states, "I took Advil yesterday and it took the edged off, but that's it." The patient is postmenopausal. Her PCP, Dr. Yannick Bell, diagnosed with her with 2 bulging discs after obtaining an MRI. She denies history of DM or IVDA, saddle anesthesia, bowel/bladder incontinence, recent fall/injury or any other symptoms at this time. No spine SHx noted. Codeine drug allergy noted.      The history is provided by the patient.     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    cyst removed from right breast      LASER LAPAROSCOPY      TUBAL LIGATION       Family History   Problem Relation Age of Onset    Cancer Maternal Grandmother     Breast cancer Sister     Asthma Sister     Arrhythmia Mother     Diabetes Mellitus Neg Hx      Social History     Tobacco Use    Smoking status: Current Every Day Smoker   Substance Use Topics    Alcohol use: Yes     Alcohol/week: 0.6 oz     Types: 1 Glasses of wine per week    Drug use: No     Review of " Systems   Constitutional: Negative for chills and fever.   HENT: Negative for congestion, rhinorrhea and sore throat.    Eyes: Negative for pain and redness.   Respiratory: Negative for cough and shortness of breath.    Cardiovascular: Negative for chest pain and palpitations.   Gastrointestinal: Negative for abdominal pain, diarrhea and nausea.   Genitourinary: Negative for dysuria, flank pain, frequency, hematuria and urgency.   Musculoskeletal: Positive for back pain (left lower). Negative for gait problem, myalgias (LLE, radiating) and neck pain.   Skin: Negative for rash.   Neurological: Negative for dizziness, light-headedness and headaches.     Physical Exam     Initial Vitals [08/24/18 1037]   BP Pulse Resp Temp SpO2   (!) 141/66 73 18 98.4 °F (36.9 °C) 100 %      MAP       --         Physical Exam    Nursing note and vitals reviewed.  Constitutional: Vital signs are normal. She appears well-developed and well-nourished. She is not diaphoretic. She is active. She does not have a sickly appearance. No distress.   HENT:   Head: Normocephalic and atraumatic.   Eyes: Conjunctivae are normal.   Neck: Normal range of motion and full passive range of motion without pain.   Cardiovascular: Normal rate, regular rhythm and normal heart sounds. Exam reveals no gallop and no friction rub.    No murmur heard.  Pulses:       Dorsalis pedis pulses are 2+ on the right side, and 2+ on the left side.   Pulmonary/Chest: Breath sounds normal. She has no wheezes. She has no rhonchi. She has no rales.   Musculoskeletal: She exhibits tenderness.   Left buttock and left lower lumbar tenderness. No midline vertebral tenderness, step-off or deformity. Full ROM of the spine.    Neurological: She is alert. She has normal strength. Gait normal.   5/5 strength in BLE's.    Skin: Skin is warm and dry. Capillary refill takes less than 2 seconds.   Psychiatric: She has a normal mood and affect.       ED Course   Procedures  Labs Reviewed -  No data to display     Imaging Results    None          Medical Decision Making:   History:   Old Medical Records: I decided to obtain old medical records.       APC / Resident Notes:   Marie Salcido is a 57 year old female presenting to the ED with c/o left lower back/buttock pain without red flag symptoms, decreased ROM, fever, or vertebral tenderness/deformity. I do not suspect fracture, cauda equina, spinal epidural abscess, acute arterial occlusion. Symptoms most consistent with lumbar radiculopathy. She was given a short course of prednisone, robaxin, and NSAIDs. I instructed her to follow up with her PCP to discuss possible MRI in the future if no improvement. Specific return precautions discussed and patient verbalized understanding. Based on my clinical evaluation, I do not appreciate any immediate, emergent, or life threatening condition or etiology that warrants additional workup today and feel that the patient can be discharged with close follow up care.          Scribe Attestation:   Scribe #1: I performed the above scribed service and the documentation accurately describes the services I performed. I attest to the accuracy of the note.    Attending Attestation:     Physician Attestation Statement for NP/PA:   I discussed this assessment and plan of this patient with the NP/PA, but I did not personally examine the patient. The face to face encounter was performed by the NP/PA.    Other NP/PA Attestation Additions:    History of Present Illness: I was not called upon to see this patient but was available for consultation and agree with the patient's disposition and management as it was presented to me by the APC.             I, Lukas Silveira NP-C, personally performed the services described in this documentation. All medical record entries made by the scribe were at my direction and in my presence.  I have reviewed the chart and agree that the record reflects my personal performance and is accurate and  complete. TORSTEN Rod.  1:47 PM 08/24/2018             Clinical Impression:   The encounter diagnosis was Lumbar radiculopathy.      Disposition:   Disposition: Discharged  Condition: Stable                        Alfreda Silveira NP  08/24/18 5019       Souleymane Wade MD  08/24/18 8802

## 2018-09-23 ENCOUNTER — HOSPITAL ENCOUNTER (EMERGENCY)
Facility: HOSPITAL | Age: 58
Discharge: HOME | End: 2018-09-23
Attending: EMERGENCY MEDICINE
Payer: OTHER GOVERNMENT

## 2018-09-23 VITALS
OXYGEN SATURATION: 99 % | RESPIRATION RATE: 18 BRPM | WEIGHT: 214 LBS | HEART RATE: 71 BPM | DIASTOLIC BLOOD PRESSURE: 72 MMHG | BODY MASS INDEX: 33.52 KG/M2 | TEMPERATURE: 98 F | SYSTOLIC BLOOD PRESSURE: 155 MMHG

## 2018-09-23 DIAGNOSIS — S86.811A STRAIN OF CALF MUSCLE, RIGHT, INITIAL ENCOUNTER: Primary | ICD-10-CM

## 2018-09-23 PROCEDURE — 99283 EMERGENCY DEPT VISIT LOW MDM: CPT

## 2018-09-23 RX ORDER — IBUPROFEN 400 MG/1
800 TABLET ORAL
Status: DISCONTINUED | OUTPATIENT
Start: 2018-09-23 | End: 2018-09-23 | Stop reason: HOSPADM

## 2018-09-23 NOTE — ED PROVIDER NOTES
"Encounter Date: 9/23/2018    SCRIBE #1 NOTE: I, Stormy Hastings , am scribing for, and in the presence of, Dr. Sandoval .       History     Chief Complaint   Patient presents with    Leg Pain     pt reports getting something off top shelf reports pop to rt lower leg       Time seen by provider: 2:00 PM on 09/23/2018    Marie Salcido is a 58 y.o. female with a PMHx of Silent MI, CAD, and anemia who presents to the ED with complaints of right calf pain with an onset x this PM. The patient relays that she was standing on a step stool trying to get something off of of shelf in her closet and felt a very sharp pain in her right calf when she stepped down. This pain was accompanied by a "popping" noise." The pain brought the patient to tears and made it very hard for her to ambulate. She denies any relief. She hasn't taken any medications for the pain. Patient takes fish oil and aspirin daily. The patient denies ankle pain or any other symptoms at this time. 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 Posadas [facility]    CORONARY ARTERY BYPASS GRAFT (CABG) Bilateral 10/4/2013    [facility]    cyst removed from right breast      HEART CATH-LEFT Left 3/21/2017    [facility]    LASER LAPAROSCOPY      TUBAL LIGATION       Family History   Problem Relation Age of Onset    Cancer Maternal Grandmother     Breast cancer Sister     Asthma Sister     Arrhythmia Mother     Diabetes Mellitus Neg Hx  "     Social History     Tobacco Use    Smoking status: Current Every Day Smoker   Substance Use Topics    Alcohol use: Yes     Alcohol/week: 0.6 oz     Types: 1 Glasses of wine per week    Drug use: No     Review of Systems   Constitutional: Negative for activity change, appetite change, chills and fever.   HENT: Negative for congestion, rhinorrhea and sore throat.    Eyes: Negative for redness and visual disturbance.   Respiratory: Negative for cough, chest tightness and shortness of breath.    Cardiovascular: Negative for chest pain.   Gastrointestinal: Negative for abdominal pain, diarrhea, nausea and vomiting.   Genitourinary: Negative for dysuria and frequency.   Musculoskeletal: Positive for myalgias (R calf ). Negative for back pain, neck pain and neck stiffness.        Denies ankle pain    Skin: Negative for rash.   Neurological: Negative for dizziness, syncope, numbness and headaches.       Physical Exam     Initial Vitals [09/23/18 1258]   BP Pulse Resp Temp SpO2   (!) 155/72 71 18 98 °F (36.7 °C) 99 %      MAP       --         Physical Exam    Nursing note and vitals reviewed.  Constitutional: She appears well-developed.   HENT:   Head: Normocephalic and atraumatic.   Eyes: EOM are normal. Pupils are equal, round, and reactive to light.   Neck: Neck supple.   Cardiovascular: Normal rate, regular rhythm, normal heart sounds and intact distal pulses. Exam reveals no gallop and no friction rub.    No murmur heard.  Pulmonary/Chest: Breath sounds normal. No respiratory distress. She has no decreased breath sounds. She has no wheezes. She has no rhonchi. She has no rales.   Abdominal: Soft. Bowel sounds are normal. She exhibits no distension. There is no tenderness.   Musculoskeletal: Normal range of motion. She exhibits tenderness.        Right lower leg: She exhibits tenderness.   Mild right calf tenderness. No significant hematoma.   No achilles tendon tenderness    Neurological: She is alert and oriented  to person, place, and time.   Skin: Skin is warm and dry.   Psychiatric: She has a normal mood and affect.         ED Course   Procedures  Labs Reviewed - No data to display       Imaging Results    None          Medical Decision Making:   History:   Old Medical Records: I decided to obtain old medical records.  Patient did not appear to be a tear her Achilles tendon however she did likely has a tear of either this a last plantaris with a gastrocnemius as mild.  She is able to bear weight but it is painful.  I will place her in an Ace wrap crutches and referred to Orthopedics.  I explained her that she does not need to bear full weight on till she is pain-free and will likely need physical therapy with orthopedic follow-up.            Scribe Attestation:   Scribe #1: I performed the above scribed service and the documentation accurately describes the services I performed. I attest to the accuracy of the note.               Clinical Impression:   The encounter diagnosis was Strain of calf muscle, right, initial encounter.      Disposition:   Disposition: Discharged  Condition: Stable         I, Dr. Alex Sandoval personally performed the services described in this documentation. All medical record entries made by the scribe were at my direction and in my presence.  I have reviewed the chart and agree that the record reflects my personal performance and is accurate and complete. Alex Sandoval MD.  2:51 PM 09/24/2018    DISCLAIMER: This note was prepared with Dragon NaturallySpeaking voice recognition transcription software. Garbled syntax, mangled pronouns, and other bizarre constructions may be attributed to that software system                Alex Sandoval MD  09/24/18 2054

## 2018-09-23 NOTE — DISCHARGE INSTRUCTIONS
You likely tore 1 of the muscles in her calf.  This could be the soleus, plantaris, or the gastrocnemius.  You do not appear to have an Achilles tendon rupture.  You need to wear an Ace wrap, ice the leg, and slowly start to bear weight on the calf.  You can use crutches as needed for pain in leg.

## 2019-06-02 ENCOUNTER — HOSPITAL ENCOUNTER (OUTPATIENT)
Facility: HOSPITAL | Age: 59
Discharge: HOME | End: 2019-06-03
Attending: EMERGENCY MEDICINE | Admitting: FAMILY MEDICINE
Payer: OTHER GOVERNMENT

## 2019-06-02 DIAGNOSIS — R07.9 CHEST PAIN: ICD-10-CM

## 2019-06-02 LAB
ALBUMIN SERPL BCP-MCNC: 3.7 G/DL (ref 3.5–5.2)
ALP SERPL-CCNC: 50 U/L (ref 55–135)
ALT SERPL W/O P-5'-P-CCNC: 24 U/L (ref 10–44)
ANION GAP SERPL CALC-SCNC: 8 MMOL/L (ref 8–16)
AST SERPL-CCNC: 21 U/L (ref 10–40)
BASOPHILS NFR BLD: 0 % (ref 0–1.9)
BILIRUB SERPL-MCNC: 0.3 MG/DL (ref 0.1–1)
BNP SERPL-MCNC: 35 PG/ML (ref 0–99)
BUN SERPL-MCNC: 7 MG/DL (ref 6–20)
CALCIUM SERPL-MCNC: 8.8 MG/DL (ref 8.7–10.5)
CHLORIDE SERPL-SCNC: 108 MMOL/L (ref 95–110)
CK MB SERPL-MCNC: 2.2 NG/ML (ref 0.1–6.5)
CK MB SERPL-MCNC: 2.2 NG/ML (ref 0.1–6.5)
CK MB SERPL-RTO: 1.1 % (ref 0–5)
CK MB SERPL-RTO: 1.1 % (ref 0–5)
CK SERPL-CCNC: 192 U/L (ref 20–180)
CK SERPL-CCNC: 192 U/L (ref 20–180)
CO2 SERPL-SCNC: 23 MMOL/L (ref 23–29)
CREAT SERPL-MCNC: 0.7 MG/DL (ref 0.5–1.4)
D DIMER PPP IA.FEU-MCNC: <0.19 MG/L FEU
DIFFERENTIAL METHOD: ABNORMAL
EOSINOPHIL NFR BLD: 0 % (ref 0–8)
ERYTHROCYTE [DISTWIDTH] IN BLOOD BY AUTOMATED COUNT: 14.9 % (ref 11.5–14.5)
EST. GFR  (AFRICAN AMERICAN): >60 ML/MIN/1.73 M^2
EST. GFR  (NON AFRICAN AMERICAN): >60 ML/MIN/1.73 M^2
GLUCOSE SERPL-MCNC: 111 MG/DL (ref 70–110)
HCT VFR BLD AUTO: 40.2 % (ref 37–48.5)
HGB BLD-MCNC: 12.6 G/DL (ref 12–16)
HYPOCHROMIA BLD QL SMEAR: ABNORMAL
LYMPHOCYTES NFR BLD: 42 % (ref 18–48)
MCH RBC QN AUTO: 20.7 PG (ref 27–31)
MCHC RBC AUTO-ENTMCNC: 31.5 G/DL (ref 32–36)
MCV RBC AUTO: 66 FL (ref 82–98)
MONOCYTES NFR BLD: 3 % (ref 4–15)
NEUTROPHILS NFR BLD: 55 % (ref 38–73)
PLATELET # BLD AUTO: 207 K/UL (ref 150–350)
PMV BLD AUTO: 9 FL (ref 9.2–12.9)
POTASSIUM SERPL-SCNC: 4 MMOL/L (ref 3.5–5.1)
PROT SERPL-MCNC: 6.9 G/DL (ref 6–8.4)
RBC # BLD AUTO: 6.11 M/UL (ref 4–5.4)
SODIUM SERPL-SCNC: 139 MMOL/L (ref 136–145)
TROPONIN I SERPL DL<=0.01 NG/ML-MCNC: 0.02 NG/ML (ref 0–0.03)
TROPONIN I SERPL DL<=0.01 NG/ML-MCNC: 0.02 NG/ML (ref 0–0.03)
TROPONIN I SERPL DL<=0.01 NG/ML-MCNC: <0.006 NG/ML (ref 0–0.03)
WBC # BLD AUTO: 3.7 K/UL (ref 3.9–12.7)

## 2019-06-02 PROCEDURE — 80053 COMPREHEN METABOLIC PANEL: CPT

## 2019-06-02 PROCEDURE — 36415 COLL VENOUS BLD VENIPUNCTURE: CPT

## 2019-06-02 PROCEDURE — 96372 THER/PROPH/DIAG INJ SC/IM: CPT | Mod: 59 | Performed by: EMERGENCY MEDICINE

## 2019-06-02 PROCEDURE — 85007 BL SMEAR W/DIFF WBC COUNT: CPT

## 2019-06-02 PROCEDURE — G0378 HOSPITAL OBSERVATION PER HR: HCPCS

## 2019-06-02 PROCEDURE — 93005 ELECTROCARDIOGRAM TRACING: CPT

## 2019-06-02 PROCEDURE — 82553 CREATINE MB FRACTION: CPT

## 2019-06-02 PROCEDURE — 85027 COMPLETE CBC AUTOMATED: CPT

## 2019-06-02 PROCEDURE — 99285 EMERGENCY DEPT VISIT HI MDM: CPT | Mod: 25

## 2019-06-02 PROCEDURE — 82550 ASSAY OF CK (CPK): CPT

## 2019-06-02 PROCEDURE — 83880 ASSAY OF NATRIURETIC PEPTIDE: CPT

## 2019-06-02 PROCEDURE — 63600175 PHARM REV CODE 636 W HCPCS: Performed by: FAMILY MEDICINE

## 2019-06-02 PROCEDURE — 84484 ASSAY OF TROPONIN QUANT: CPT | Mod: 91

## 2019-06-02 PROCEDURE — 25000003 PHARM REV CODE 250: Performed by: NURSE PRACTITIONER

## 2019-06-02 PROCEDURE — 94761 N-INVAS EAR/PLS OXIMETRY MLT: CPT

## 2019-06-02 PROCEDURE — 85379 FIBRIN DEGRADATION QUANT: CPT

## 2019-06-02 RX ORDER — MORPHINE SULFATE 2 MG/ML
2 INJECTION, SOLUTION INTRAMUSCULAR; INTRAVENOUS EVERY 4 HOURS PRN
Status: DISCONTINUED | OUTPATIENT
Start: 2019-06-02 | End: 2019-06-03 | Stop reason: HOSPADM

## 2019-06-02 RX ORDER — ENOXAPARIN SODIUM 100 MG/ML
40 INJECTION SUBCUTANEOUS EVERY 12 HOURS
Status: DISCONTINUED | OUTPATIENT
Start: 2019-06-02 | End: 2019-06-03 | Stop reason: HOSPADM

## 2019-06-02 RX ORDER — NITROGLYCERIN 0.4 MG/1
0.4 TABLET SUBLINGUAL EVERY 5 MIN PRN
Status: COMPLETED | OUTPATIENT
Start: 2019-06-02 | End: 2019-06-02

## 2019-06-02 RX ORDER — SODIUM CHLORIDE 0.9 % (FLUSH) 0.9 %
10 SYRINGE (ML) INJECTION
Status: DISCONTINUED | OUTPATIENT
Start: 2019-06-02 | End: 2019-06-03 | Stop reason: HOSPADM

## 2019-06-02 RX ORDER — NAPROXEN SODIUM 220 MG/1
162 TABLET, FILM COATED ORAL
Status: COMPLETED | OUTPATIENT
Start: 2019-06-02 | End: 2019-06-02

## 2019-06-02 RX ORDER — NAPROXEN SODIUM 220 MG/1
81 TABLET, FILM COATED ORAL
Status: COMPLETED | OUTPATIENT
Start: 2019-06-02 | End: 2019-06-02

## 2019-06-02 RX ORDER — ASPIRIN 81 MG/1
81 TABLET ORAL DAILY
Status: DISCONTINUED | OUTPATIENT
Start: 2019-06-03 | End: 2019-06-03 | Stop reason: HOSPADM

## 2019-06-02 RX ORDER — ONDANSETRON 2 MG/ML
4 INJECTION INTRAMUSCULAR; INTRAVENOUS EVERY 6 HOURS PRN
Status: DISCONTINUED | OUTPATIENT
Start: 2019-06-02 | End: 2019-06-03 | Stop reason: HOSPADM

## 2019-06-02 RX ORDER — ACETAMINOPHEN 325 MG/1
650 TABLET ORAL EVERY 6 HOURS PRN
Status: DISCONTINUED | OUTPATIENT
Start: 2019-06-02 | End: 2019-06-03 | Stop reason: HOSPADM

## 2019-06-02 RX ADMIN — ENOXAPARIN SODIUM 40 MG: 100 INJECTION SUBCUTANEOUS at 10:06

## 2019-06-02 RX ADMIN — ASPIRIN 81 MG CHEWABLE TABLET 81 MG: 81 TABLET CHEWABLE at 10:06

## 2019-06-02 RX ADMIN — NITROGLYCERIN 0.4 MG: 0.4 TABLET, ORALLY DISINTEGRATING SUBLINGUAL at 08:06

## 2019-06-02 RX ADMIN — ASPIRIN 81 MG CHEWABLE TABLET 162 MG: 81 TABLET CHEWABLE at 10:06

## 2019-06-02 RX ADMIN — NITROGLYCERIN 0.4 MG: 0.4 TABLET, ORALLY DISINTEGRATING SUBLINGUAL at 09:06

## 2019-06-02 NOTE — PLAN OF CARE
"Received pt to floor with NAD noted. VSS. Pt denies CP at this time but states she has had similar chest pain in the past that starts in her chest and radiates down her left arm that "hurts so bad I want to cut my arm off." Telemetry on pt at this time, current rhythm- NSR hr SR 62. Pt reviewed on safety precautions. Pt verbalized understanding. Will continue to monitor.   "

## 2019-06-02 NOTE — SUBJECTIVE & OBJECTIVE
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 Posadas [facility]    CORONARY ARTERY BYPASS GRAFT (CABG) Bilateral 10/4/2013    Performed by Luke [facility]adas MD at [facility] OR 2ND FLR    cyst removed from right breast      HEART CATH-LEFT Left 3/21/2017    Performed by [facility]obo [facility] at [facility] CATH LAB    LASER LAPAROSCOPY      TUBAL LIGATION         Review of patient's allergies indicates:   Allergen Reactions    Codeine Rash       No current facility-administered medications on file prior to encounter.      Current Outpatient Medications on File Prior to Encounter   Medication Sig    aspirin (ECOTRIN) 81 MG EC tablet Take 81 mg by mouth once daily.    atorvastatin (LIPITOR) 40 MG tablet Take 1 tablet (40 mg total) by mouth once daily.    diclofenac (VOLTAREN) 50 MG EC tablet Take 1 tablet (50 mg total) by mouth 3 (three) times daily as needed.    famotidine (PEPCID) 20 MG tablet Take 1 tablet (20 mg total) by mouth 2 (two) times daily.    fluticasone (FLONASE) 50 mcg/actuation nasal spray 1 spray by Each Nare route 2 (two) times daily as needed for Rhinitis.    hydrochlorothiazide (HYDRODIURIL) 25 MG tablet Take 1 tablet (25 mg total) by mouth once daily.    ibuprofen (ADVIL,MOTRIN) 600 MG tablet Take 600 mg by mouth 3 (three) times daily.    lisinopril 10 MG tablet Take 10 mg by mouth once daily.    meclizine (ANTIVERT) 32 MG tablet Take 32 mg by mouth 3 (three) times daily as needed.    trazodone (DESYREL) 100 MG tablet Take 100 mg by mouth every evening.     Family History     Problem Relation (Age of Onset)    Arrhythmia Mother    Asthma Sister    Breast cancer Sister    Cancer Maternal Grandmother         Tobacco Use    Smoking status: Current Every Day Smoker   Substance and Sexual Activity    Alcohol use: Yes     Alcohol/week: 0.6 oz     Types: 1 Glasses of wine per week    Drug use: No    Sexual activity: Yes     Partners: Male     Review of Systems   Constitutional: Negative for chills and fever.   HENT: Negative for congestion and sore throat.    Eyes: Negative for visual disturbance.   Respiratory: Negative for cough and shortness of breath.    Cardiovascular: Positive for chest pain.   Gastrointestinal: Negative for abdominal pain, constipation, diarrhea, nausea and vomiting.   Genitourinary: Negative for dysuria.   Musculoskeletal: Positive for myalgias. Negative for joint swelling.   Skin: Negative for rash and wound.   Neurological: Negative for dizziness and headaches.   Hematological: Does not bruise/bleed easily.     Objective:     Vital Signs (Most Recent):  Temp: 96.9 °F (36.1 °C) (06/02/19 1441)  Pulse: (!) 54 (06/02/19 1441)  Resp: 16 (06/02/19 1441)  BP: (!) 142/67 (06/02/19 1441)  SpO2: 100 % (06/02/19 1441) Vital Signs (24h Range):  Temp:  [96.9 °F (36.1 °C)-98 °F (36.7 °C)] 96.9 °F (36.1 °C)  Pulse:  [50-80] 54  Resp:  [16-20] 16  SpO2:  [100 %] 100 %  BP: (132-183)/() 142/67     Weight: 97.7 kg (215 lb 6.2 oz)  Body mass index is 33.73 kg/m².    Physical Exam   Constitutional: She appears well-developed and well-nourished.   HENT:   Head: Normocephalic and atraumatic.   Eyes: Conjunctivae and EOM are normal.   Neck: Normal range of motion. Neck supple.   Cardiovascular: Normal rate and regular rhythm.   Pulmonary/Chest: Effort normal and breath sounds normal.   Abdominal: Soft. Bowel sounds are normal.   Musculoskeletal: Normal range of motion. She exhibits no tenderness.   Neurological: She is alert.   Skin: Skin is warm.   Psychiatric: She has a normal mood and affect. Her behavior is normal.   Vitals reviewed.       Significant Labs:   Recent Lab Results       06/02/19  1000         Albumin 3.7     Alkaline Phosphatase 50     ALT 24     Anion Gap 8     AST 21     Basophil% 0.0     BILIRUBIN TOTAL 0.3  Comment:  For infants and newborns, interpretation of results should be based  on gestational age, weight and in agreement with clinical  observations.  Premature Infant recommended reference ranges:  Up to 24 hours.............<8.0 mg/dL  Up to 48 hours............<12.0 mg/dL  3-5 days..................<15.0 mg/dL  6-29 days.................<15.0 mg/dL       BNP 35  Comment:  Values of less than 100 pg/ml are consistent with non-CHF populations.     BUN, Bld 7     Calcium 8.8     Chloride 108     CO2 23     Creatinine 0.7     D-Dimer <0.19  Comment:  The quantitative D-dimer assay should be used as an aid in   the diagnosis of deep vein thrombosis and pulmonary embolism  in patients with the appropriate presentation and clinical  history. The upper limit of the reference interval and the clinical   cut off   point are identical. Causes of a positive (>0.50 mg/L FEU) D-Dimer   test  include, but are not limited to: DVT, PE, DIC, thrombolytic   therapy, anticoagulant therapy, recent surgery, trauma, or   pregnancy, disseminated malignancy, aortic aneurysm, cirrhosis,  and severe infection. False negative results may occur in   patients with distal DVT.       Differential Method Manual  Comment:  Corrected result; previously reported as Automated on 06/02/2019 at   11:00.  [C]     eGFR if  >60     eGFR if non  >60  Comment:  Calculation used to obtain the estimated glomerular filtration  rate (eGFR) is the CKD-EPI equation.        Eosinophil% 0.0     Glucose 111     Gran% 55.0     Hematocrit 40.2     Hemoglobin 12.6     Hypo Occasional     Lymph% 42.0     MCH 20.7     MCHC 31.5     MCV 66     Mono% 3.0     MPV 9.0     Platelets 207     Potassium 4.0     PROTEIN TOTAL 6.9     RBC 6.11     RDW 14.9     Sodium 139     Troponin I 0.016  Comment:  The reference interval  for Troponin I represents the 99th percentile   cutoff   for our facility and is consistent with 3rd generation assay   performance.       WBC 3.70           Significant Imaging:    Imaging Results          X-Ray Chest PA And Lateral (Final result)  Result time 06/02/19 10:36:09    Final result by Dylan Encinas MD (06/02/19 10:36:09)                 Impression:      No acute abnormality.      Electronically signed by: Dylan Encinas  Date:    06/02/2019  Time:    10:36             Narrative:    EXAMINATION:  XR CHEST PA AND LATERAL    CLINICAL HISTORY:  Chest Pain;    TECHNIQUE:  PA and lateral views of the chest were performed.    COMPARISON:  Chest x-ray 03/21/2017.    FINDINGS:  The lungs are clear, with normal appearance of pulmonary vasculature and no pleural effusion or pneumothorax.    The cardiac silhouette is normal in size.  Prior CABG.  The hilar and mediastinal contours are unremarkable.    Bones are intact.

## 2019-06-02 NOTE — PROGRESS NOTES
[facility] Hospital Medicine  Progress Note    Patient Name: Marie Salcido  MRN: [id number]  Patient Class: OP- Observation   Admission Date: 6/2/2019  Length of Stay: 0 days  Attending Physician: Cordell Orta MD  Primary Care Provider: Yannick Bell MD        Subjective:     Principal Problem:Chest pain    HPI:  Marie Salcido is a 58 y.o. female with PMHx of CAD, silent MI, and anemia who presents to the ED with an onset of dull, left-sided chest pain and left arm pain that has been on and off for the past 2-3 months. The patient reports that the pain is sporadic and happens once a day to once a week. She reports that the pain is not brought on by any activity or worse with deep breaths. She reports that she sometimes, but not always, feels weakness and SOB when the pain happens. The patient denies leg swelling, neck pain, or any other symptoms at this time. Patient's PSHx includes CABG. Drug allergy to Codeine noted. Medication list reviewed. States that she is only taking aspirin.     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 Posadas [facility]    CORONARY ARTERY BYPASS GRAFT (CABG) Bilateral 10/4/2013    [facility]formed [facility] 2ND FLR    cyst removed from right breast      HEART CATH-LEFT Left 3/21/2017    Performed by [facility] at [facility] CATH LAB    LASER LAPAROSCOPY      TUBAL LIGATION         Review of patient's allergies indicates:   Allergen Reactions    Codeine Rash       No current facility-administered medications on file prior to encounter.      Current Outpatient Medications on File Prior to Encounter   Medication Sig    aspirin (ECOTRIN) 81  MG EC tablet Take 81 mg by mouth once daily.    atorvastatin (LIPITOR) 40 MG tablet Take 1 tablet (40 mg total) by mouth once daily.    diclofenac (VOLTAREN) 50 MG EC tablet Take 1 tablet (50 mg total) by mouth 3 (three) times daily as needed.    famotidine (PEPCID) 20 MG tablet Take 1 tablet (20 mg total) by mouth 2 (two) times daily.    fluticasone (FLONASE) 50 mcg/actuation nasal spray 1 spray by Each Nare route 2 (two) times daily as needed for Rhinitis.    hydrochlorothiazide (HYDRODIURIL) 25 MG tablet Take 1 tablet (25 mg total) by mouth once daily.    ibuprofen (ADVIL,MOTRIN) 600 MG tablet Take 600 mg by mouth 3 (three) times daily.    lisinopril 10 MG tablet Take 10 mg by mouth once daily.    meclizine (ANTIVERT) 32 MG tablet Take 32 mg by mouth 3 (three) times daily as needed.    trazodone (DESYREL) 100 MG tablet Take 100 mg by mouth every evening.     Family History     Problem Relation (Age of Onset)    Arrhythmia Mother    Asthma Sister    Breast cancer Sister    Cancer Maternal Grandmother        Tobacco Use    Smoking status: Current Every Day Smoker   Substance and Sexual Activity    Alcohol use: Yes     Alcohol/week: 0.6 oz     Types: 1 Glasses of wine per week    Drug use: No    Sexual activity: Yes     Partners: Male     Review of Systems   Constitutional: Negative for chills and fever.   HENT: Negative for congestion and sore throat.    Eyes: Negative for visual disturbance.   Respiratory: Negative for cough and shortness of breath.    Cardiovascular: Positive for chest pain.   Gastrointestinal: Negative for abdominal pain, constipation, diarrhea, nausea and vomiting.   Genitourinary: Negative for dysuria.   Musculoskeletal: Positive for myalgias. Negative for joint swelling.   Skin: Negative for rash and wound.   Neurological: Negative for dizziness and headaches.   Hematological: Does not bruise/bleed easily.     Objective:     Vital Signs (Most Recent):  Temp: 96.9 °F (36.1 °C)  (06/02/19 1441)  Pulse: (!) 54 (06/02/19 1441)  Resp: 16 (06/02/19 1441)  BP: (!) 142/67 (06/02/19 1441)  SpO2: 100 % (06/02/19 1441) Vital Signs (24h Range):  Temp:  [96.9 °F (36.1 °C)-98 °F (36.7 °C)] 96.9 °F (36.1 °C)  Pulse:  [50-80] 54  Resp:  [16-20] 16  SpO2:  [100 %] 100 %  BP: (132-183)/() 142/67     Weight: 97.7 kg (215 lb 6.2 oz)  Body mass index is 33.73 kg/m².    Physical Exam   Constitutional: She appears well-developed and well-nourished.   HENT:   Head: Normocephalic and atraumatic.   Eyes: Conjunctivae and EOM are normal.   Neck: Normal range of motion. Neck supple.   Cardiovascular: Normal rate and regular rhythm.   Pulmonary/Chest: Effort normal and breath sounds normal.   Abdominal: Soft. Bowel sounds are normal.   Musculoskeletal: Normal range of motion. She exhibits no tenderness.   Neurological: She is alert.   Skin: Skin is warm.   Psychiatric: She has a normal mood and affect. Her behavior is normal.   Vitals reviewed.       Significant Labs:   Recent Lab Results       06/02/19  1000        Albumin 3.7     Alkaline Phosphatase 50     ALT 24     Anion Gap 8     AST 21     Basophil% 0.0     BILIRUBIN TOTAL 0.3  Comment:  For infants and newborns, interpretation of results should be based  on gestational age, weight and in agreement with clinical  observations.  Premature Infant recommended reference ranges:  Up to 24 hours.............<8.0 mg/dL  Up to 48 hours............<12.0 mg/dL  3-5 days..................<15.0 mg/dL  6-29 days.................<15.0 mg/dL       BNP 35  Comment:  Values of less than 100 pg/ml are consistent with non-CHF populations.     BUN, Bld 7     Calcium 8.8     Chloride 108     CO2 23     Creatinine 0.7     D-Dimer <0.19  Comment:  The quantitative D-dimer assay should be used as an aid in   the diagnosis of deep vein thrombosis and pulmonary embolism  in patients with the appropriate presentation and clinical  history. The upper limit of the reference interval  and the clinical   cut off   point are identical. Causes of a positive (>0.50 mg/L FEU) D-Dimer   test  include, but are not limited to: DVT, PE, DIC, thrombolytic   therapy, anticoagulant therapy, recent surgery, trauma, or   pregnancy, disseminated malignancy, aortic aneurysm, cirrhosis,  and severe infection. False negative results may occur in   patients with distal DVT.       Differential Method Manual  Comment:  Corrected result; previously reported as Automated on 06/02/2019 at   11:00.  [C]     eGFR if  >60     eGFR if non  >60  Comment:  Calculation used to obtain the estimated glomerular filtration  rate (eGFR) is the CKD-EPI equation.        Eosinophil% 0.0     Glucose 111     Gran% 55.0     Hematocrit 40.2     Hemoglobin 12.6     Hypo Occasional     Lymph% 42.0     MCH 20.7     MCHC 31.5     MCV 66     Mono% 3.0     MPV 9.0     Platelets 207     Potassium 4.0     PROTEIN TOTAL 6.9     RBC 6.11     RDW 14.9     Sodium 139     Troponin I 0.016  Comment:  The reference interval for Troponin I represents the 99th percentile   cutoff   for our facility and is consistent with 3rd generation assay   performance.       WBC 3.70           Significant Imaging:    Imaging Results          X-Ray Chest PA And Lateral (Final result)  Result time 06/02/19 10:36:09    Final result by Dylan Encinas MD (06/02/19 10:36:09)                 Impression:      No acute abnormality.      Electronically signed by: Dylan Encinas  Date:    06/02/2019  Time:    10:36             Narrative:    EXAMINATION:  XR CHEST PA AND LATERAL    CLINICAL HISTORY:  Chest Pain;    TECHNIQUE:  PA and lateral views of the chest were performed.    COMPARISON:  Chest x-ray 03/21/2017.    FINDINGS:  The lungs are clear, with normal appearance of pulmonary vasculature and no pleural effusion or pneumothorax.    The cardiac silhouette is normal in size.  Prior CABG.  The hilar and mediastinal contours are  unremarkable.    Bones are intact.                                  Assessment/Plan:      * Chest pain  Cards consulted.       Anomalous coronary artery origin  Will be evaluated by cards      CAD (coronary artery disease)  Cards consulted  Continue asa        VTE Risk Mitigation (From admission, onward)    None              Lizett Sullivan MD  Department of Hospital Medicine   [facility]

## 2019-06-02 NOTE — ED PROVIDER NOTES
Encounter Date: 6/2/2019    SCRIBE #1 NOTE: Chikis HAYES and sara scribing for, and in the presence of, Claritza Quiñonez NP.       History     Chief Complaint   Patient presents with    Chest Pain     on and off     Time seen by provider: 9:47 AM on 06/02/2019    Marie Salcido is a 58 y.o. female with PMHx of CAD, silent MI, and anemia who presents to the ED with an onset of dull, left-sided chest pain and left arm pain that has been on and off for the past 2-3 months. The patient reports that the pain is sporadic and happens once a day to once a week. She reports that the pain is not brought on by any activity or worse with deep breaths. She reports that she sometimes, but not always, feels weakness and SOB when the pain happens. The patient denies leg swelling, neck pain, or any other symptoms at this time. Patient's PSHx includes CABG. Drug allergy to Codeine noted.    The history is provided by the patient.     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 Posadas [facility]    CORONARY ARTERY BYPASS GRAFT (CABG) Bilateral 10/4/2013    Performed [facility] at [facility] OR 2ND FLR    cyst removed from right breast      HEART CATH-LEFT Left 3/21/2017    [facility] CATH LAB    LASER LAPAROSCOPY      TUBAL LIGATION       Family History   Problem Relation Age of Onset    Cancer Maternal Grandmother     Breast cancer Sister     Asthma Sister     Arrhythmia Mother     Diabetes Mellitus Neg Hx      Social History     Tobacco Use    Smoking status: Current Every Day Smoker   Substance Use  Topics    Alcohol use: Yes     Alcohol/week: 0.6 oz     Types: 1 Glasses of wine per week    Drug use: No     Review of Systems   Constitutional: Negative for fever.   HENT: Negative for sore throat.    Respiratory: Positive for shortness of breath.    Cardiovascular: Positive for chest pain.   Gastrointestinal: Negative for nausea.   Genitourinary: Negative for dysuria.   Musculoskeletal: Positive for myalgias. Negative for back pain.   Skin: Negative for rash.   Neurological: Positive for weakness.   Hematological: Does not bruise/bleed easily.       Physical Exam     Initial Vitals [06/02/19 0937]   BP Pulse Resp Temp SpO2   (!) 178/86 80 20 98 °F (36.7 °C) 100 %      MAP       --         Physical Exam    Nursing note and vitals reviewed.  Constitutional: Vital signs are normal. She appears well-developed and well-nourished.   HENT:   Head: Normocephalic and atraumatic.   Eyes: Pupils are equal, round, and reactive to light.   Neck: Neck supple.   Cardiovascular: Normal rate, regular rhythm, normal heart sounds and intact distal pulses. Exam reveals no gallop and no friction rub.    No murmur heard.  Pulmonary/Chest: Breath sounds normal. She has no wheezes. She has no rhonchi. She has no rales.   Abdominal: Soft. Normal appearance and bowel sounds are normal. There is no tenderness.   Neurological: She is alert and oriented to person, place, and time. She has normal strength.   Skin: Skin is warm, dry and intact.   Psychiatric: She has a normal mood and affect. Her speech is normal and behavior is normal.         ED Course   Procedures  Labs Reviewed   CBC W/ AUTO DIFFERENTIAL - Abnormal; Notable for the following components:       Result Value    WBC 3.70 (*)     RBC 6.11 (*)     Mean Corpuscular Volume 66 (*)     Mean Corpuscular Hemoglobin 20.7 (*)     Mean Corpuscular Hemoglobin Conc 31.5 (*)     RDW 14.9 (*)     MPV 9.0 (*)     Mono% 3.0 (*)     All other components within normal limits   COMPREHENSIVE  METABOLIC PANEL - Abnormal; Notable for the following components:    Glucose 111 (*)     Alkaline Phosphatase 50 (*)     All other components within normal limits   TROPONIN I   B-TYPE NATRIURETIC PEPTIDE   D DIMER, QUANTITATIVE     EKG Readings: (Independently Interpreted)   Initial Reading: No STEMI.   Normal sinus rhythm at a rate of 68 bpm. Normal intervals. Nonspecific ST changes. T Wave inversion in V2. No STEMI.       Imaging Results          X-Ray Chest PA And Lateral (Final result)  Result time 06/02/19 10:36:09    Final result by Dylan Encinas MD (06/02/19 10:36:09)                 Impression:      No acute abnormality.      Electronically signed by: Dylan Encinas  Date:    06/02/2019  Time:    10:36             Narrative:    EXAMINATION:  XR CHEST PA AND LATERAL    CLINICAL HISTORY:  Chest Pain;    TECHNIQUE:  PA and lateral views of the chest were performed.    COMPARISON:  Chest x-ray 03/21/2017.    FINDINGS:  The lungs are clear, with normal appearance of pulmonary vasculature and no pleural effusion or pneumothorax.    The cardiac silhouette is normal in size.  Prior CABG.  The hilar and mediastinal contours are unremarkable.    Bones are intact.                                 Medical Decision Making:   History:   Old Medical Records: I decided to obtain old medical records.  Differential Diagnosis:   ACS  PE  Costochondritis    Clinical Tests:   Lab Tests: Ordered and Reviewed  Radiological Study: Ordered and Reviewed  Medical Tests: Ordered and Reviewed       APC / Resident Notes:   Patient is a 58 y.o. female who presents to the ED 06/02/2019 who underwent emergent evaluation for chest pain for 2-3 months.  Patient initially has chest pain in the emergency department and she is given aspirin but it resolved prior to being able to give her nitroglycerin.  EKG without acute ST elevation or depression.  She does have a T-wave inversion in V2 which she has had in the past.  Troponin is normal.  Chest x-ray without acute findings.  D-dimer normal at have a low suspicion for PE.  I do not think CT scan is indicated at this time.  I do not think aortic or thoracic aneurysm or dissection.  I do not think pericarditis.  Labs are otherwise unremarkable. Findings discussed with Dr. Orta who is on-call for patient's cardiologist Dr. Kd Cisse.  He would like to admit the patient for observation overnight given the patient's history.  Patient is agreeable to this at this time.  Case is also discussed with Dr. [facility] medicine who is accepting of admission for further evaluation and treatment chest pain.  Patient is admitted to hospital medicine team in stable condition.  Case is also discussed with Dr. Wills who is agreeable to plan of care.             Scribe Attestation:   Scribe #1: I performed the above scribed service and the documentation accurately describes the services I performed. I attest to the accuracy of the note.    Attending Attestation:           Physician Attestation for Scribe:  Physician Attestation Statement for Scribe #1: I, Claritza Quiñonez, reviewed documentation, as scribed by in my presence, and it is both accurate and complete.     Comments: I, TORSTEN Biggs, personally performed the services described in this documentation. All medical record entries made by the scribe were at my direction and in my presence.  I have reviewed the chart and agree that the record reflects my personal performance and is accurate and complete. TORSTEN Biggs.  7:43 PM 06/02/2019 e            ED Course as of Jun 02 1257   Sun Jun 02, 2019   1208 Discussed findings with patient.  Patient is currently chest pain free.  She is not having any symptoms.  Paged Dr. Orta who is on call for patient's cardiologist Dr. Cisse.    [JK]   1230 Left voicemail again for Dr. Orta.    [JK]      ED Course User Index  [JK] Claritza Quiñonez NP     Clinical Impression:       ICD-10-CM ICD-9-CM   1. Chest pain  R07.9 786.50         Disposition:   Disposition: Placed in Observation  Condition: Stable                        Claritza Quiñonez NP  06/02/19 1943

## 2019-06-02 NOTE — HPI
"This is a 58 y.o. female with PMHx of CAD, silent MI, and anemia who presents to the ED with an onset of dull, left-sided chest pain and left arm pain that has been on and off for the past 2-3 months. The patient reports that the pain is sporadic and happens once a day to once a week. She reports that the pain is not brought on by any activity or worse with deep breaths. She reports that she sometimes, but not always, feels weakness and SOB when the pain happens. The patient denies leg swelling, neck pain, or any other symptoms at this time. Patient's PSHx includes CABG. Drug allergy to Codeine noted. Medication list reviewed. States that she is only taking aspirin."  Patient was placed in the hospital for further evaluation treatment.  "

## 2019-06-02 NOTE — ED NOTES
Upon transfer to room 203a, patient is AAOx4, no cardiac or respiratory complications. No needs or questions before transfer. GINA KING

## 2019-06-02 NOTE — H&P
"[facility] Hospital Medicine  H&P    Patient Name: Marie Salcido  MRN: [id number]  Patient Class: OP- Observation   Admission Date: 6/2/2019  Length of Stay: 0 days  Attending Physician: Cordell Orta MD  Primary Care Provider: Yannick Bell MD        Subjective:     Principal Problem:Chest pain    HPI:  HPI per ED, "Marie Salcido is a 58 y.o. female with PMHx of CAD, silent MI, and anemia who presents to the ED with an onset of dull, left-sided chest pain and left arm pain that has been on and off for the past 2-3 months. The patient reports that the pain is sporadic and happens once a day to once a week. She reports that the pain is not brought on by any activity or worse with deep breaths. She reports that she sometimes, but not always, feels weakness and SOB when the pain happens. The patient denies leg swelling, neck pain, or any other symptoms at this time. Patient's PSHx includes CABG. Drug allergy to Codeine noted. Medication list reviewed. States that she is only taking aspirin."    Spoke with patient in ER. Admits that pain is intermittent. Feels like pressure deep on the left side of her chest and arm. Tried Advil once before coming to the ER. Thinks it did help a little. Denies nausea or vomiting.     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 Posadas [facility]    CORONARY ARTERY BYPASS GRAFT (CABG) Bilateral 10/4/2013    Performed by Luke [facility] at [facility] OR 2ND FLR    cyst removed from right breast      HEART CATH-LEFT Left 3/21/2017    Performed by Luís Lobo MD at [facility] CATH LAB    LASER LAPAROSCOPY      TUBAL LIGATION         Review of patient's allergies " indicates:   Allergen Reactions    Codeine Rash       No current facility-administered medications on file prior to encounter.      Current Outpatient Medications on File Prior to Encounter   Medication Sig    aspirin (ECOTRIN) 81 MG EC tablet Take 81 mg by mouth once daily.    atorvastatin (LIPITOR) 40 MG tablet Take 1 tablet (40 mg total) by mouth once daily.    diclofenac (VOLTAREN) 50 MG EC tablet Take 1 tablet (50 mg total) by mouth 3 (three) times daily as needed.    famotidine (PEPCID) 20 MG tablet Take 1 tablet (20 mg total) by mouth 2 (two) times daily.    fluticasone (FLONASE) 50 mcg/actuation nasal spray 1 spray by Each Nare route 2 (two) times daily as needed for Rhinitis.    hydrochlorothiazide (HYDRODIURIL) 25 MG tablet Take 1 tablet (25 mg total) by mouth once daily.    ibuprofen (ADVIL,MOTRIN) 600 MG tablet Take 600 mg by mouth 3 (three) times daily.    lisinopril 10 MG tablet Take 10 mg by mouth once daily.    meclizine (ANTIVERT) 32 MG tablet Take 32 mg by mouth 3 (three) times daily as needed.    trazodone (DESYREL) 100 MG tablet Take 100 mg by mouth every evening.     Family History     Problem Relation (Age of Onset)    Arrhythmia Mother    Asthma Sister    Breast cancer Sister    Cancer Maternal Grandmother        Tobacco Use    Smoking status: Current Every Day Smoker   Substance and Sexual Activity    Alcohol use: Yes     Alcohol/week: 0.6 oz     Types: 1 Glasses of wine per week    Drug use: No    Sexual activity: Yes     Partners: Male     Review of Systems   Constitutional: Negative for chills and fever.   HENT: Negative for congestion and sore throat.    Eyes: Negative for visual disturbance.   Respiratory: Negative for cough and shortness of breath.    Cardiovascular: Positive for chest pain.   Gastrointestinal: Negative for abdominal pain, constipation, diarrhea, nausea and vomiting.   Genitourinary: Negative for dysuria.   Musculoskeletal: Positive for myalgias. Negative  for joint swelling.   Skin: Negative for rash and wound.   Neurological: Negative for dizziness and headaches.   Hematological: Does not bruise/bleed easily.     Objective:     Vital Signs (Most Recent):  Temp: 96.9 °F (36.1 °C) (06/02/19 1441)  Pulse: (!) 54 (06/02/19 1441)  Resp: 16 (06/02/19 1441)  BP: (!) 142/67 (06/02/19 1441)  SpO2: 100 % (06/02/19 1441) Vital Signs (24h Range):  Temp:  [96.9 °F (36.1 °C)-98 °F (36.7 °C)] 96.9 °F (36.1 °C)  Pulse:  [50-80] 54  Resp:  [16-20] 16  SpO2:  [100 %] 100 %  BP: (132-183)/() 142/67     Weight: 97.7 kg (215 lb 6.2 oz)  Body mass index is 33.73 kg/m².    Physical Exam   Constitutional: She appears well-developed and well-nourished.   HENT:   Head: Normocephalic and atraumatic.   Eyes: Conjunctivae and EOM are normal.   Neck: Normal range of motion. Neck supple.   Cardiovascular: Normal rate and regular rhythm.   Pulmonary/Chest: Effort normal and breath sounds normal.   Abdominal: Soft. Bowel sounds are normal.   Musculoskeletal: Normal range of motion. She exhibits no tenderness.   Neurological: She is alert.   Skin: Skin is warm.   Psychiatric: She has a normal mood and affect. Her behavior is normal.   Vitals reviewed.       Significant Labs:   Recent Lab Results       06/02/19  1000        Albumin 3.7     Alkaline Phosphatase 50     ALT 24     Anion Gap 8     AST 21     Basophil% 0.0     BILIRUBIN TOTAL 0.3  Comment:  For infants and newborns, interpretation of results should be based  on gestational age, weight and in agreement with clinical  observations.  Premature Infant recommended reference ranges:  Up to 24 hours.............<8.0 mg/dL  Up to 48 hours............<12.0 mg/dL  3-5 days..................<15.0 mg/dL  6-29 days.................<15.0 mg/dL       BNP 35  Comment:  Values of less than 100 pg/ml are consistent with non-CHF populations.     BUN, Bld 7     Calcium 8.8     Chloride 108     CO2 23     Creatinine 0.7     D-Dimer <0.19  Comment:  The  quantitative D-dimer assay should be used as an aid in   the diagnosis of deep vein thrombosis and pulmonary embolism  in patients with the appropriate presentation and clinical  history. The upper limit of the reference interval and the clinical   cut off   point are identical. Causes of a positive (>0.50 mg/L FEU) D-Dimer   test  include, but are not limited to: DVT, PE, DIC, thrombolytic   therapy, anticoagulant therapy, recent surgery, trauma, or   pregnancy, disseminated malignancy, aortic aneurysm, cirrhosis,  and severe infection. False negative results may occur in   patients with distal DVT.       Differential Method Manual  Comment:  Corrected result; previously reported as Automated on 06/02/2019 at   11:00.  [C]     eGFR if  >60     eGFR if non  >60  Comment:  Calculation used to obtain the estimated glomerular filtration  rate (eGFR) is the CKD-EPI equation.        Eosinophil% 0.0     Glucose 111     Gran% 55.0     Hematocrit 40.2     Hemoglobin 12.6     Hypo Occasional     Lymph% 42.0     MCH 20.7     MCHC 31.5     MCV 66     Mono% 3.0     MPV 9.0     Platelets 207     Potassium 4.0     PROTEIN TOTAL 6.9     RBC 6.11     RDW 14.9     Sodium 139     Troponin I 0.016  Comment:  The reference interval for Troponin I represents the 99th percentile   cutoff   for our facility and is consistent with 3rd generation assay   performance.       WBC 3.70           Significant Imaging:    Imaging Results          X-Ray Chest PA And Lateral (Final result)  Result time 06/02/19 10:36:09    Final result by Dylan Encinas MD (06/02/19 10:36:09)                 Impression:      No acute abnormality.      Electronically signed by: Dylan Encinas  Date:    06/02/2019  Time:    10:36             Narrative:    EXAMINATION:  XR CHEST PA AND LATERAL    CLINICAL HISTORY:  Chest Pain;    TECHNIQUE:  PA and lateral views of the chest were performed.    COMPARISON:  Chest x-ray  03/21/2017.    FINDINGS:  The lungs are clear, with normal appearance of pulmonary vasculature and no pleural effusion or pneumothorax.    The cardiac silhouette is normal in size.  Prior CABG.  The hilar and mediastinal contours are unremarkable.    Bones are intact.                                  Assessment/Plan:      * Chest pain  Cards consulted.       Anomalous coronary artery origin  Will be evaluated by cards      CAD (coronary artery disease)  Cards consulted  Continue asa        VTE Risk Mitigation (From admission, onward)    None              Lizett Sullivan MD  Department of Hospital Medicine   [facility]

## 2019-06-03 VITALS
DIASTOLIC BLOOD PRESSURE: 67 MMHG | BODY MASS INDEX: 33.8 KG/M2 | HEART RATE: 65 BPM | OXYGEN SATURATION: 99 % | WEIGHT: 215.38 LBS | HEIGHT: 67 IN | SYSTOLIC BLOOD PRESSURE: 122 MMHG | RESPIRATION RATE: 18 BRPM | TEMPERATURE: 97 F

## 2019-06-03 PROBLEM — M25.512 ACUTE PAIN OF LEFT SHOULDER: Status: ACTIVE | Noted: 2019-06-03

## 2019-06-03 PROBLEM — M54.2 NECK PAIN: Status: ACTIVE | Noted: 2019-06-03

## 2019-06-03 LAB
CV PHARM DOSE: 0.4 MG
CV STRESS BASE HR: 51 BPM
DIASTOLIC BLOOD PRESSURE: 69 MMHG
OHS CV CPX 85 PERCENT MAX PREDICTED HEART RATE MALE: 133
OHS CV CPX ESTIMATED METS: 1
OHS CV CPX MAX PREDICTED HEART RATE: 157
OHS CV CPX PATIENT IS FEMALE: 1
OHS CV CPX PATIENT IS MALE: 0
OHS CV CPX PEAK DIASTOLIC BLOOD PRESSURE: 62 MMHG
OHS CV CPX PEAK HEAR RATE: 99 BPM
OHS CV CPX PEAK RATE PRESSURE PRODUCT: NORMAL
OHS CV CPX PEAK SYSTOLIC BLOOD PRESSURE: 117 MMHG
OHS CV CPX PERCENT MAX PREDICTED HEART RATE ACHIEVED: 63
OHS CV CPX RATE PRESSURE PRODUCT PRESENTING: 4845
STRESS ECHO POST EXERCISE DUR MIN: 1 MINUTES
STRESS ECHO POST EXERCISE DUR SEC: 1 SECONDS
STRESS ECHO TARGET HR: 139.4 BPM
SYSTOLIC BLOOD PRESSURE: 95 MMHG
TROPONIN I SERPL DL<=0.01 NG/ML-MCNC: 0.01 NG/ML (ref 0–0.03)
TROPONIN I SERPL DL<=0.01 NG/ML-MCNC: <0.006 NG/ML (ref 0–0.03)

## 2019-06-03 PROCEDURE — 84484 ASSAY OF TROPONIN QUANT: CPT

## 2019-06-03 PROCEDURE — 25000003 PHARM REV CODE 250: Performed by: NURSE PRACTITIONER

## 2019-06-03 PROCEDURE — 96374 THER/PROPH/DIAG INJ IV PUSH: CPT | Mod: 59 | Performed by: EMERGENCY MEDICINE

## 2019-06-03 PROCEDURE — 96375 TX/PRO/DX INJ NEW DRUG ADDON: CPT | Mod: 59 | Performed by: EMERGENCY MEDICINE

## 2019-06-03 PROCEDURE — 36415 COLL VENOUS BLD VENIPUNCTURE: CPT

## 2019-06-03 PROCEDURE — 63600175 PHARM REV CODE 636 W HCPCS: Performed by: SPECIALIST

## 2019-06-03 PROCEDURE — G0378 HOSPITAL OBSERVATION PER HR: HCPCS

## 2019-06-03 PROCEDURE — 96372 THER/PROPH/DIAG INJ SC/IM: CPT | Mod: 59 | Performed by: EMERGENCY MEDICINE

## 2019-06-03 PROCEDURE — 25000003 PHARM REV CODE 250: Performed by: FAMILY MEDICINE

## 2019-06-03 PROCEDURE — 63600175 PHARM REV CODE 636 W HCPCS: Performed by: FAMILY MEDICINE

## 2019-06-03 PROCEDURE — 94761 N-INVAS EAR/PLS OXIMETRY MLT: CPT

## 2019-06-03 PROCEDURE — 63600175 PHARM REV CODE 636 W HCPCS: Performed by: NURSE PRACTITIONER

## 2019-06-03 RX ORDER — REGADENOSON 0.08 MG/ML
0.4 INJECTION, SOLUTION INTRAVENOUS ONCE
Status: COMPLETED | OUTPATIENT
Start: 2019-06-03 | End: 2019-06-03

## 2019-06-03 RX ORDER — IBUPROFEN 600 MG/1
600 TABLET ORAL EVERY 8 HOURS PRN
Start: 2019-06-03 | End: 2019-07-16

## 2019-06-03 RX ORDER — PANTOPRAZOLE SODIUM 40 MG/1
40 TABLET, DELAYED RELEASE ORAL DAILY
Qty: 30 TABLET | Refills: 0 | Status: SHIPPED | OUTPATIENT
Start: 2019-06-03 | End: 2021-12-16

## 2019-06-03 RX ORDER — RAMELTEON 8 MG/1
8 TABLET ORAL NIGHTLY PRN
Status: DISCONTINUED | OUTPATIENT
Start: 2019-06-03 | End: 2019-06-03 | Stop reason: HOSPADM

## 2019-06-03 RX ORDER — ATORVASTATIN CALCIUM 40 MG/1
40 TABLET, FILM COATED ORAL DAILY
Qty: 30 TABLET | Refills: 0 | Status: SHIPPED | OUTPATIENT
Start: 2019-06-03 | End: 2022-04-07

## 2019-06-03 RX ORDER — DIPHENHYDRAMINE HCL 25 MG
25 CAPSULE ORAL EVERY 6 HOURS PRN
Status: DISCONTINUED | OUTPATIENT
Start: 2019-06-03 | End: 2019-06-03 | Stop reason: HOSPADM

## 2019-06-03 RX ORDER — FAMOTIDINE 20 MG/1
20 TABLET, FILM COATED ORAL 2 TIMES DAILY
Qty: 60 TABLET | Refills: 0 | Status: SHIPPED | OUTPATIENT
Start: 2019-06-03 | End: 2021-12-16

## 2019-06-03 RX ORDER — LISINOPRIL 10 MG/1
10 TABLET ORAL DAILY
Qty: 30 TABLET | Refills: 0 | Status: SHIPPED | OUTPATIENT
Start: 2019-06-03 | End: 2021-12-16

## 2019-06-03 RX ORDER — ACETAMINOPHEN 325 MG/1
650 TABLET ORAL EVERY 6 HOURS PRN
Refills: 0 | COMMUNITY
Start: 2019-06-03 | End: 2019-07-16

## 2019-06-03 RX ADMIN — REGADENOSON 0.4 MG: 0.08 INJECTION, SOLUTION INTRAVENOUS at 12:06

## 2019-06-03 RX ADMIN — ENOXAPARIN SODIUM 40 MG: 100 INJECTION SUBCUTANEOUS at 09:06

## 2019-06-03 RX ADMIN — ACETAMINOPHEN 650 MG: 325 TABLET ORAL at 09:06

## 2019-06-03 RX ADMIN — RAMELTEON 8 MG: 8 TABLET, FILM COATED ORAL at 12:06

## 2019-06-03 RX ADMIN — MORPHINE SULFATE 2 MG: 2 INJECTION, SOLUTION INTRAMUSCULAR; INTRAVENOUS at 12:06

## 2019-06-03 RX ADMIN — ONDANSETRON 4 MG: 2 INJECTION INTRAMUSCULAR; INTRAVENOUS at 12:06

## 2019-06-03 RX ADMIN — ASPIRIN 81 MG: 81 TABLET, COATED ORAL at 01:06

## 2019-06-03 NOTE — NURSING
Per Dr Orta  - pt is clear for discharge. She needs to follow up w Dr Cisse in 2 weeks and will need a CT of the Left shoulder and C Spine.   Chikis Acosta notified.

## 2019-06-03 NOTE — PLAN OF CARE
06/02/19 1923   Patient Assessment/Suction   Level of Consciousness (AVPU) alert   Respiratory Effort Unlabored   PRE-TX-O2   O2 Device (Oxygen Therapy) room air   SpO2 98 %   Pulse Oximetry Type Intermittent   $ Pulse Oximetry - Multiple Charge Pulse Oximetry - Multiple

## 2019-06-03 NOTE — DISCHARGE SUMMARY
"[facility] Hospital Medicine  Discharge Summary    Patient Name: Marie Salcido  MRN: [id number]  Admission Date: 6/2/2019  Hospital Length of Stay: 0 days  Discharge Date and Time: 6/3/2019  4:09 PM  Attending Physician: Nelida att. providers found   Discharging Provider: JAMAR Ken  Primary Care Provider: Yannick Bell MD    HPI:   This is a 58 y.o. female with PMHx of CAD, silent MI, and anemia who presents to the ED with an onset of dull, left-sided chest pain and left arm pain that has been on and off for the past 2-3 months. The patient reports that the pain is sporadic and happens once a day to once a week. She reports that the pain is not brought on by any activity or worse with deep breaths. She reports that she sometimes, but not always, feels weakness and SOB when the pain happens. The patient denies leg swelling, neck pain, or any other symptoms at this time. Patient's PSHx includes CABG. Drug allergy to Codeine noted. Medication list reviewed. States that she is only taking aspirin."  Patient was placed in the hospital for further evaluation treatment.    * No surgery found *      Hospital Course:   The patient was monitored closely during her stay.  She was kept on continuous telemetry monitoring.  Cardiology was consulted.  Her troponins were trended and remained negative.  Patient underwent a Lexiscan stress test which was negative for any signs of reversible ischemia and her EF was within normal limits.  Her overall condition remained stable and she was discharged to home once cleared by Cardiology.  It was recommended that the patient have a CT scan of her neck and left shoulder as outpatient.  She was to follow up with Dr. Cisse in the clinic next week.     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    Specialty:  Family Medicine  Why:  at 9:30 AM  Contact information:  [address]  [facility] [address]  [phone]             Parvez Cisse MD On 6/24/2019.    Specialty:  Cardiology  Why:  at 3:45 pm    PLEASE MAKE SURE TO GET AUTHORIZATION FORM THROUGH PCP BEFORE APPT  Take blood pressure and pulse 2 x day and keep log for follow up. Ct scan of neck and left shoulder  Contact information:  [address]  [address]  [place] [address]  [phone]                 Patient Instructions:      CT Cervical Spine W Wo Contrast   Standing Status: Future Standing Exp. Date: 07/03/19     Order Specific Question Answer Comments   Is the patient allergic to iodine or contrast? Has a steroid / antihistamine prep been administered? No    Is the patient on ANY Metformin drug such as Glucophage/Glucovance?           Should be off drug 48 hours after contrast. Check renal function before restart. No    History of Kidney Disease - including: decreased kidney function, dialysis, kidney transplay, single kidney, kidney cancer, kidney surgery? None    Does the patient have high blood pressure requiring medical treatment? Yes    Diabetes? No    May the Radiologist modify the order per protocol to meet the clinical needs of the patient? Yes    Will this service be billed to a Worker's Comp policy? No      CT Should W W/O Contrast Left    Standing Status: Future Standing Exp. Date: 07/03/19     Order Specific Question Answer Comments   Is the patient allergic to iodine or contrast? Has a steroid / antihistamine prep been administered? No    Is the patient on ANY Metformin drug such as Glucophage/Glucovance?           Should be off drug 48 hours after contrast. Check renal function before restart. No    History of Kidney Disease - including: decreased kidney function, dialysis, kidney transplay, single kidney, kidney cancer, kidney surgery? None    Does the patient have high blood pressure requiring medical treatment? Yes    Diabetes? No    May the Radiologist modify the order per protocol to meet the clinical needs of the patient? Yes      Diet Cardiac     Notify your health care provider if you experience any of the following:  temperature >100.4     Notify your health care provider if you experience any of the following:  severe uncontrolled pain     Notify your health care provider if you experience any of the following:  difficulty breathing or increased cough     Notify your health care provider if you experience any of the following:   Order Comments: Any decline in condition     Activity as tolerated     Significant Diagnostic Studies:     D-dimer< 0.19    BNP 35    XR CHEST PA AND LATERAL-    TECHNIQUE:  PA and lateral views of the chest were performed.    COMPARISON:  Chest x-ray 03/21/2017.    FINDINGS:  The lungs are clear, with normal appearance of pulmonary vasculature and no pleural effusion or pneumothorax.    The cardiac silhouette is normal in size.  Prior CABG.  The hilar and mediastinal contours are unremarkable.    Bones are intact.      Impression       No acute abnormality.      Electronically signed by: Dylan Encinas  Date: 06/02/2019  Time: 10:36       NM MYOCARDIAL PERFUSION SPECT MULTI PHARM-    CLINICAL HISTORY:  Chest pain, acute coronary syndrome suspect;    TECHNIQUE:  SPECT images were acquired after the injection of  "14.8 mCi of Tc-99m mild view at rest and 29.4 mCi during a cardiac stress. The clinical stress and ECG portion of the study is to be read separately.    COMPARISON:  None.    FINDINGS:  The quality of the study is compromised by GI activity adjacent to the inferior wall.    Gated left ventricular ejection fractions are 55% at rest and 67% at stress    As visualized there is uniform left ventricular myocardial activity with no fixed or reversible defects suggesting scar or reversible ischemia.  No cavitary dilatation of the left ventricle with stress.  No regional wall motion abnormality appreciated      Impression       1. No fixed or reversible defects suggesting scar or reversible ischemia.  2. Gated left ventricular ejection fraction 67% at stress and 55% at rest      Electronically signed by: Heidy Camejo MD  Date: 06/03/2019  Time: 14:36                STRESS TEST ONLY, REGADENOSON   Conclusion          The EKG portion of this study is negative for ischemia.    There were no arrhythmias during stress.    The patient reported no chest pain during the stress test.    Myoview spect scan report is awaites.           Vitals     Height Weight BMI (Calculated) BSA (Calculated - sq m) BP   5' 7" (1.702 m) 97.7 kg (215 lb 6.2 oz) 33.8 2.15 sq meters 122/67     Stress Findings     Stress Findings A pharmacological stress test was performed using regadenoson(0.4 mg). Total exercise time was 1 minutes 1 seconds sec. The patient reported shortness of breath and abdominal pain during the stress test.   Baseline ECG The baseline ECG is normal.   Stress/Recovery ECG There was no ST segment deviation identified during the protocol.There were no arrhythmias during stress.   ECG Conclusion The ECG portion of the test negative for ischemia.   Stress Measurements     Baseline Data   HR at rest 51 bpm      Systolic blood pressure 95 mmHg      Diastolic blood pressure 69 mmHg      Stress Data   Peak HR 99 bpm      Peak Systolic "  mmHg      Peak Diatolic BP 62 mmHg      Estimated METs 1       Max Predicted        Target .4 bpm      % Max HR Achieved 63          Signed     Electronically signed by Cordell Orta MD on 6/3/19 at 1254 CDT     CMP   Recent Labs   Lab 06/02/19  1000      K 4.0      CO2 23   *   BUN 7   CREATININE 0.7   CALCIUM 8.8   PROT 6.9   ALBUMIN 3.7   BILITOT 0.3   ALKPHOS 50*   AST 21   ALT 24   ANIONGAP 8   ESTGFRAFRICA >60   EGFRNONAA >60   CBC   Recent Labs   Lab 06/02/19  1000   WBC 3.70*   HGB 12.6   HCT 40.2      Lipid Panel   Lab Results   Component Value Date    CHOL 133 03/22/2017    HDL 41 03/22/2017    LDLCALC 69.4 03/22/2017    TRIG 113 03/22/2017    CHOLHDL 30.8 03/22/2017   Troponin   Recent Labs   Lab 06/03/19  0845   TROPONINI <0.006       Pending Diagnostic Studies:     None         Medications:  Reconciled Home Medications:      Medication List      START taking these medications    acetaminophen 325 MG tablet  Commonly known as:  TYLENOL  Take 2 tablets (650 mg total) by mouth every 6 (six) hours as needed.     pantoprazole 40 MG tablet  Commonly known as:  PROTONIX  Take 1 tablet (40 mg total) by mouth once daily.        CHANGE how you take these medications    ibuprofen 600 MG tablet  Commonly known as:  ADVIL,MOTRIN  Take 1 tablet (600 mg total) by mouth every 8 (eight) hours as needed for Pain (Take with food to help prevent GI upset).  What changed:    · when to take this  · reasons to take this        CONTINUE taking these medications    aspirin 81 MG EC tablet  Commonly known as:  ECOTRIN  Take 81 mg by mouth once daily.     atorvastatin 40 MG tablet  Commonly known as:  LIPITOR  Take 1 tablet (40 mg total) by mouth once daily.     diclofenac 50 MG EC tablet  Commonly known as:  VOLTAREN  Take 1 tablet (50 mg total) by mouth 3 (three) times daily as needed.     famotidine 20 MG tablet  Commonly known as:  PEPCID  Take 1 tablet (20 mg total) by mouth 2  (two) times daily.     fluticasone propionate 50 mcg/actuation nasal spray  Commonly known as:  FLONASE  1 spray by Each Nare route 2 (two) times daily as needed for Rhinitis.     lisinopril 10 MG tablet  Take 1 tablet (10 mg total) by mouth once daily.     meclizine 32 MG tablet  Commonly known as:  ANTIVERT  Take 32 mg by mouth 3 (three) times daily as needed.     traZODone 100 MG tablet  Commonly known as:  DESYREL  Take 100 mg by mouth every evening.        STOP taking these medications    hydroCHLOROthiazide 25 MG tablet  Commonly known as:  HYDRODIURIL          Indwelling Lines/Drains at time of discharge:   Lines/Drains/Airways          None        Time spent on the discharge of patient: 48 minutes  Patient was seen and examined on the date of discharge and determined to be suitable for discharge.      JAMAR Ken  Department of Hospital Medicine  [facility]

## 2019-06-03 NOTE — HOSPITAL COURSE
The patient was monitored closely during her stay.  She was kept on continuous telemetry monitoring.  Cardiology was consulted.  Her troponins were trended and remained negative.  Patient underwent a Lexiscan stress test which was negative for any signs of reversible ischemia.  Her overall condition remained stable and she was discharged to home once cleared by Cardiology.  It was recommended that the patient have a CT scan of her neck and left shoulder as outpatient.  She was to follow up with Dr. Cisse in the clinic next week.

## 2019-06-03 NOTE — PLAN OF CARE
Follow up appt made with Dr. Cisse. AVS updated     06/03/19 1359   Discharge Assessment   Assessment Type Discharge Planning Reassessment

## 2019-06-03 NOTE — PROGRESS NOTES
Patient discussed with Dr. Orta and then also Dr. Cisse. Patient cleared for discharge and to follow up with Dr. Cisse in a week in the clinic. Patient to have a CT scan of neck and Left shoulder as recommended by Dr. Orta.

## 2019-06-03 NOTE — PLAN OF CARE
CM met with pt bedside. Verified all information on facesheet as correct. Pharm is [facility] on [address]. Pt denies POA or LW. NOK is spouse Rip [phone]. Pt drives herself. Plan is DC home with no needs per pt. CM will follow.      06/03/19 1050   Discharge Assessment   Assessment Type Discharge Planning Assessment   Assessment information obtained from? Patient   Communicated expected length of stay with patient/caregiver yes   Prior to hospitilization cognitive status: Alert/Oriented;No Deficits   Prior to hospitalization functional status: Independent   Current cognitive status: Alert/Oriented   Current Functional Status: Independent   Lives With spouse   Is patient able to care for self after discharge? Yes   Patient's perception of discharge disposition home or selfcare   Readmission Within the Last 30 Days no previous admission in last 30 days   Patient currently being followed by outpatient case management? No   Patient currently receives any other outside agency services? No   Equipment Currently Used at Home none   Do you have any problems affording any of your prescribed medications? No   Is the patient taking medications as prescribed? yes   Does the patient have transportation home? Yes   Transportation Anticipated family or friend will provide   Does the patient receive services at the Coumadin Clinic? No   Discharge Plan A Home   DME Needed Upon Discharge  none   Patient/Family in Agreement with Plan yes

## 2019-06-03 NOTE — PLAN OF CARE
Problem: Adult Inpatient Plan of Care  Goal: Plan of Care Review  Outcome: Ongoing (interventions implemented as appropriate)  Bed is in low position, call light is within reach, SR up x 2, instructed to use call light for assistance.  Cardiac monitored SR, VSS, patient complained of pain under rib cage rotating to left arm, 3 doses of Nitroglycerin given before pain subsided.  Informed on call physician, EKG and troponin ordered, PRN pain, nausea, and sleep medication given to maintain comfort, patient is resting between care, will continue to monitor and round.

## 2019-06-03 NOTE — PLAN OF CARE
06/03/19 1559   Final Note   Assessment Type Final Discharge Note   Anticipated Discharge Disposition Home   Hospital Follow Up  Appt(s) scheduled? Yes

## 2019-06-03 NOTE — CONSULTS
A 58-year-old patient was admitted from the Emergency Room.  The patient has   been having recurrent episodes of pain in the left shoulder and arm for the past   three to four days.  She described the pain as excruciating and it is felt in   the left armpit, but in the not the chest.  The pain is not brought on by   physical activity and occur spontaneously and last for several minutes.  No   associated dyspnea or diaphoresis.  She has no history of recent trauma.  No   symptoms of paroxysmal nocturnal dyspnea or palpitations.  The patient had a   history of bypass surgery.  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  She had another   angiogram done,, but nothing was done for it.    PAST MEDICAL HISTORY:  History of hypertension, diabetes mellitus, sleep apnea,   coronary artery bypass surgery.    ALLERGIES:  CODEINE.    MEDICATIONS:  Include aspirin, Lipitor, Pepcid, Flonase, HydroDIURIL, ibuprofen,   lisinopril, meclizine, trazodone.    PHYSICAL EXAMINATION:  GENERAL:  Shows a well-built female patient in no acute distress.  VITAL SIGNS:  Temperature 98, pulse 60 per minute and regular, blood pressure   142/67, O2 sats 97.7% on room air.  Body weight 215 pounds.  HEENT:  Normocephalic.  Sclerae nonicteric.  NECK:  Supple.  No jugular venous distention.  Carotids 1+ without bruit.  CARDIAC:  Regular rhythm.  No audible murmur, gallop or rub.  MUSCULOSKELETAL:  Range of motion of the left shoulder is normal.  No symptoms   elicited.  LUNGS:  Clear to auscultation.  ABDOMEN:  Soft, nontender.  Bowel sounds present.  EXTREMITIES:  No edema of feet or calf tenderness note.    A 12-lead EKG and chest x-ray are not available.    LABORATORY:  WBC 3.7, hemoglobin 12.6, hematocrit 40.2.  D-dimer less than 0.19.    Sodium 139, potassium 4.0, chloride, 108, CO2 23, BUN 7, creatinine  0.7,   glucose 111.  AST and ALT within normal range.  , CK-MB 2.2, troponin   0.021 x2.  BNP 35.    IMPRESSION:  1. Atypical pain in the left arm, etiology uncertain.  2. 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  3. Hypertension.  4. Diabetes mellitus.  5. Obesity.    The patient's two sets of enzymes are negative.  The patient is scheduled for   Lexiscan Myoview stress test in the a.m.  Further recommendations will depend on   clinical course.        SHALINI  dd: 06/02/2019 20:11:11 (CDT)  td: 06/03/2019 00:00:20 (CDT)  Doc ID   #[id number]  Job ID #[id number]    CC:

## 2019-06-03 NOTE — PLAN OF CARE
Hospital follow up with PCP made. AVS updated.     06/03/19 1513   Discharge Assessment   Assessment Type Discharge Planning Reassessment

## 2019-06-03 NOTE — DISCHARGE INSTRUCTIONS
Thank you for choosing [facility] for your medical care. The primary doctor who is taking care of you at the time of your discharge is Ara Betancourt MD.     You were admitted to the hospital with Chest pain.     Please note your discharge instructions, including diet/activity restrictions, follow-up appointments, and medication changes.  If you have any questions about your medical issues, prescriptions, or any other questions, please feel free to contact the [facility] Medicine Dept at [phone] and we will help.    Please direct all long term medication refills and follow up to your primary care provider, Yannick Bell MD. Thank you again for letting us take care of your health care needs.    Please note the following discharge instructions per your discharging physician-  Chikis Baez Np

## 2019-06-04 ENCOUNTER — TELEPHONE (OUTPATIENT)
Dept: MEDSURG UNIT | Facility: HOSPITAL | Age: 59
End: 2019-06-04

## 2019-06-10 ENCOUNTER — HOSPITAL ENCOUNTER (OUTPATIENT)
Dept: RADIOLOGY | Facility: HOSPITAL | Age: 59
Discharge: HOME | End: 2019-06-10
Attending: NURSE PRACTITIONER
Payer: OTHER GOVERNMENT

## 2019-06-10 DIAGNOSIS — R07.9 CHEST PAIN: ICD-10-CM

## 2019-06-10 PROCEDURE — 73200 CT SHOULDER WITHOUT CONTRAST LEFT: ICD-10-PCS | Mod: 26,LT,, | Performed by: RADIOLOGY

## 2019-06-10 PROCEDURE — 72125 CT NECK SPINE W/O DYE: CPT | Mod: TC

## 2019-06-10 PROCEDURE — 73200 CT UPPER EXTREMITY W/O DYE: CPT | Mod: 26,LT,, | Performed by: RADIOLOGY

## 2019-06-10 PROCEDURE — 72125 CT CERVICAL SPINE WITHOUT CONTRAST: ICD-10-PCS | Mod: 26,,, | Performed by: RADIOLOGY

## 2019-06-10 PROCEDURE — 72125 CT NECK SPINE W/O DYE: CPT | Mod: 26,,, | Performed by: RADIOLOGY

## 2019-06-10 PROCEDURE — 73200 CT UPPER EXTREMITY W/O DYE: CPT | Mod: TC,LT

## 2019-06-26 ENCOUNTER — TELEPHONE (OUTPATIENT)
Dept: NEUROLOGY | Facility: CLINIC | Age: 59
End: 2019-06-26

## 2019-06-26 NOTE — TELEPHONE ENCOUNTER
----- Message from Guy Washington sent at 6/26/2019 11:13 AM CDT -----  Type: Needs Medical Advice    Who Called:  self  Symptoms (please be specific):    How long has patient had these symptoms:    Pharmacy name and phone #:    Best Call Back Number: [phone]  Additional Information: Patient called asking to speak with the nurse regarding authorization for insurance.

## 2019-07-11 ENCOUNTER — OFFICE VISIT (OUTPATIENT)
Dept: NEUROLOGY | Facility: CLINIC | Age: 59
End: 2019-07-11
Payer: OTHER GOVERNMENT

## 2019-07-11 VITALS
DIASTOLIC BLOOD PRESSURE: 86 MMHG | BODY MASS INDEX: 34.01 KG/M2 | SYSTOLIC BLOOD PRESSURE: 130 MMHG | WEIGHT: 216.69 LBS | HEART RATE: 80 BPM | HEIGHT: 67 IN

## 2019-07-11 DIAGNOSIS — R51.9 NONINTRACTABLE EPISODIC HEADACHE, UNSPECIFIED HEADACHE TYPE: Primary | ICD-10-CM

## 2019-07-11 PROCEDURE — 99999 PR PBB SHADOW E&M-EST. PATIENT-LVL III: CPT | Mod: PBBFAC,,, | Performed by: PSYCHIATRY & NEUROLOGY

## 2019-07-11 PROCEDURE — 99213 OFFICE O/P EST LOW 20 MIN: CPT | Mod: PBBFAC | Performed by: PSYCHIATRY & NEUROLOGY

## 2019-07-11 PROCEDURE — 99204 PR OFFICE/OUTPT VISIT, NEW, LEVL IV, 45-59 MIN: ICD-10-PCS | Mod: S$PBB,,, | Performed by: PSYCHIATRY & NEUROLOGY

## 2019-07-11 PROCEDURE — 99999 PR PBB SHADOW E&M-EST. PATIENT-LVL III: ICD-10-PCS | Mod: PBBFAC,,, | Performed by: PSYCHIATRY & NEUROLOGY

## 2019-07-11 PROCEDURE — 99204 OFFICE O/P NEW MOD 45 MIN: CPT | Mod: S$PBB,,, | Performed by: PSYCHIATRY & NEUROLOGY

## 2019-07-11 NOTE — PROGRESS NOTES
"  Marie Salcido is a 58 y.o. year old female that  presents with complains of pain Ltemple- L ear and L arm.    HPI:  Mrs Salcido has CAD s/p cardiac surgery, MI, ZAID, sciatica, and recent onset of the aforementioned symptoms.  Said that she had had short periods of L arm pain in the past, but few weeks ago she developed " severe, excruciating pain in the L UE associated with tingling, numbness and heaviness of the LUE which prompted an admission to [facility] where she had extensive but unrevealing cardiac testing as well as CT neck that was unimpressive.   She stated that the LUE pain went away 2 days later and " hasn't come back again" but still from time to time she is bother by pain L side of the head that also travel to the L ear and neck, and very sporadic sensation of numbness L toes.  Denies vertigo, double vision, difficulty swallowing, focal weakness, imbalance, slurred speech, language or vision impairment.  She is concerned that she may be had a stroke.        Past Medical History:   Diagnosis Date    Anemia     Coronary artery disease     Silent myocardial infarction     Sleep apnea      Social History     Socioeconomic History    Marital status:      Spouse name: Not on file    Number of children: Not on file    Years of education: Not on file    Highest education level: Not on file   Occupational History    Not on file   Social Needs    Financial resource strain: Not on file    Food insecurity:     Worry: Not on file     Inability: Not on file    Transportation needs:     Medical: Not on file     Non-medical: Not on file   Tobacco Use    Smoking status: Current Every Day Smoker   Substance and Sexual Activity    Alcohol use: Yes     Alcohol/week: 0.6 oz     Types: 1 Glasses of wine per week    Drug use: No    Sexual activity: Yes     Partners: Male   Lifestyle    Physical activity:     Days per week: Not on file     Minutes per session: Not on file    Stress: Not on " "file   Relationships    Social connections:     Talks on phone: Not on file     Gets together: Not on file     Attends [employer] service: Not on file     Active member of club or organization: Not on file     Attends meetings of clubs or organizations: Not on file     Relationship status: Not on file   Other Topics Concern    Not on file   Social History Narrative    Not on file     Past Surgical History:   Procedure Laterality Date    CORONARY ARTERY BYPASS GRAFT  10/4/13    2-vessel CABG LIMA-LAD, left SVG-OM2 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 Posadas [facility]    CORONARY ARTERY BYPASS GRAFT (CABG) Bilateral 10/4/2013    [facility] 2ND F[facility]    cyst removed from right breast      HEART CATH-LEFT Left 3/21/2017    Performed by Luís Lobo [facility] at [facility] CATH LAB    LASER LAPAROSCOPY      TUBAL LIGATION       Family History   Problem Relation Age of Onset    Cancer Maternal Grandmother     Breast cancer Sister     Asthma Sister     Arrhythmia Mother     Diabetes Mellitus Neg Hx            Review of Systems  General ROS: negative for chills, fever or weight loss  Psychological ROS: negative for hallucination, depression or suicidal ideation  Ophthalmic ROS: negative for blurry vision, photophobia or eye pain  ENT ROS: negative for epistaxis, sore throat or rhinorrhea  Respiratory ROS: no cough, shortness of breath, or wheezing  Cardiovascular ROS: no chest pain or dyspnea on exertion  Gastrointestinal ROS: no abdominal pain, change in bowel habits, or black/ bloody stools  Genito-Urinary ROS: no dysuria, trouble voiding, or hematuria  Musculoskeletal ROS: negative for gait disturbance or muscular weakness  Neurological ROS: no syncope or seizures; no ataxia  Dermatological ROS: negative for pruritis, rash and jaundice      Physical Exam:  /86   Pulse 80   Ht 5' 7" (1.702 m)   Wt 98.3 kg (216 lb 11.4 oz)   LMP 10/04/2013  "  BMI 33.94 kg/m²   General appearance: alert, cooperative, no distress  Constitutional:Oriented to person, place, and time.appears well-developed and well-nourished.   HEENT: Normocephalic, atraumatic, neck symmetrical, no nasal discharge   Eyes: conjunctivae/corneas clear, PERRL, EOM's intact  Lungs: clear to auscultation bilaterally, no dullness to percussion bilaterally  Heart: regular rate and rhythm without rub; no displacement of the PMI   Abdomen: soft, non-tender; bowel sounds normoactive; no organomegaly  Extremities: extremities symmetric; no clubbing, cyanosis, or edema  Integument: Skin color, texture, turgor normal; no rashes; hair distrubution normal  Neurologic:   Mental status: alert and awake, oriented x 4, comprehension, naming, and repetition intact. No right to left confusion. Performs serial 7's without difficulty .No dysarthria.  CN 2-12: pupils 4 mm bilaterally, reactive to light. Fundi without papilledema. Visual fields full to confrontation. EOM full without nystagmus. Face sensation normal in all distributions. Face symmetric. Hearing grossly intact. Palate elevates well. Tongue midline without atrophy or fasciculations.  Motor: 5/5 all over  Sensory: intact in all modalities.  DTR's: 2+ all over.  Plantars: no tested.  Coordination: finger to nose and heel-knee-shin intact.  Gait: no ataxia or bradykinesia    LABS:    Complete Blood Count  Lab Results   Component Value Date    RBC 6.11 (H) 06/02/2019    HGB 12.6 06/02/2019    HCT 40.2 06/02/2019    MCV 66 (L) 06/02/2019    MCH 20.7 (L) 06/02/2019    MCHC 31.5 (L) 06/02/2019    RDW 14.9 (H) 06/02/2019     06/02/2019    MPV 9.0 (L) 06/02/2019    GRAN 55.0 06/02/2019    LYMPH 42.0 06/02/2019    MONO 3.0 (L) 06/02/2019    EOS 0.2 03/23/2017    BASO 0.01 03/23/2017    EOSINOPHIL 0.0 06/02/2019    BASOPHIL 0.0 06/02/2019    DIFFMETHOD Manual 06/02/2019       Comprehensive Metabolic Panel  Lab Results   Component Value Date     (H)  06/02/2019    BUN 7 06/02/2019    CREATININE 0.7 06/02/2019     06/02/2019    K 4.0 06/02/2019     06/02/2019    PROT 6.9 06/02/2019    ALBUMIN 3.7 06/02/2019    BILITOT 0.3 06/02/2019    AST 21 06/02/2019    ALKPHOS 50 (L) 06/02/2019    CO2 23 06/02/2019    ALT 24 06/02/2019    ANIONGAP 8 06/02/2019    EGFRNONAA >60 06/02/2019    ESTGFRAFRICA >60 06/02/2019       TSH  Lab Results   Component Value Date    TSH 1.550 03/21/2017         Assessment: 59 y/o with CAD s/p cardiac surgery, MI, ZAID, sciatica, and recent onset of episodic pain L temple- L ear and L arm.  Neuro-exam unremarkable.  Overall pattern of symptoms rather no specific.         No diagnosis found.  There were no encounter diagnoses.      Plan:  1) Pain L temple- L ear and L arm: MRI brain  2) CAD  3) MI  4) ZAID  No orders of the defined types were placed in this encounter.          Jigar Mott MD

## 2019-07-11 NOTE — LETTER
July 11, 2019      Yannick Bell MD  [address] [facility] [address]           [facility]  [address]  [place] [address]  Phone: [phone]          Patient: Marie Salcido   MR Number: [id number]   YOB: 1960   Date of Visit: 7/11/2019       Dear Dr. Yannick Bell:    Thank you for referring Marie Salciod to me for evaluation. Attached you will find relevant portions of my assessment and plan of care.    If you have questions, please do not hesitate to call me. I look forward to following Marie Salcido along with you.    Sincerely,    Jigar Mott MD    Enclosure  CC:  No Recipients    If you would like to receive this communication electronically, please contact [email or web address] or [phone] to request more information on Mass[employer] Link access.    For providers and/or their staff who would like to refer a patient to [facility], please contact us through our one-stop-shop provider referral line, [facility], at [phone].    If you feel you have received this communication in error or would no longer like to receive these types of communications, please e-mail [email or web address]

## 2019-07-16 ENCOUNTER — OFFICE VISIT (OUTPATIENT)
Dept: ORTHOPEDICS | Facility: CLINIC | Age: 59
End: 2019-07-16
Payer: OTHER GOVERNMENT

## 2019-07-16 VITALS
WEIGHT: 216 LBS | BODY MASS INDEX: 33.9 KG/M2 | HEART RATE: 80 BPM | SYSTOLIC BLOOD PRESSURE: 122 MMHG | HEIGHT: 67 IN | DIASTOLIC BLOOD PRESSURE: 70 MMHG

## 2019-07-16 DIAGNOSIS — R22.31 MASS OF FINGER OF RIGHT HAND: Primary | ICD-10-CM

## 2019-07-16 PROCEDURE — 99203 PR OFFICE/OUTPT VISIT, NEW, LEVL III, 30-44 MIN: ICD-10-PCS | Mod: 57,,, | Performed by: ORTHOPAEDIC SURGERY

## 2019-07-16 PROCEDURE — 73140 X-RAY EXAM OF FINGER(S): CPT | Mod: RT,,, | Performed by: ORTHOPAEDIC SURGERY

## 2019-07-16 PROCEDURE — 73140 PR  X-RAY EXAM OF FINGER(S): ICD-10-PCS | Mod: RT,,, | Performed by: ORTHOPAEDIC SURGERY

## 2019-07-16 PROCEDURE — 99203 OFFICE O/P NEW LOW 30 MIN: CPT | Mod: 57,,, | Performed by: ORTHOPAEDIC SURGERY

## 2019-07-16 NOTE — LETTER
July 16, 2019      Yannick Bell MD  [address] [facility] [address]           [facility]  [address]  [facility] [address]  Phone: [phone]  Fax: [phone]          Patient: Marie Salcido   MR Number: [id number]   YOB: 1960   Date of Visit: 7/16/2019       Dear Dr. Yannick Bell:    Thank you for referring Marie Salcido to me for evaluation. Attached you will find relevant portions of my assessment and plan of care.    If you have questions, please do not hesitate to call me. I look forward to following Marie Salcido along with you.    Sincerely,    Nate Sumner MD    Enclosure  CC:  No Recipients    If you would like to receive this communication electronically, please contact [email or web address] or [phone] to request more information on [employer] Link access.    For providers and/or their staff who would like to refer a patient to [facility], please contact us through our one-stop-shop provider referral line, [facility], at [phone].    If you feel you have received this communication in error or would no longer like to receive these types of communications, please e-mail [email or web address]

## 2019-07-17 ENCOUNTER — TELEPHONE (OUTPATIENT)
Dept: ORTHOPEDICS | Facility: CLINIC | Age: 59
End: 2019-07-17

## 2019-07-17 DIAGNOSIS — R22.31 MASS OF FINGER OF RIGHT HAND: Primary | ICD-10-CM

## 2019-07-18 ENCOUNTER — HOSPITAL ENCOUNTER (OUTPATIENT)
Dept: RADIOLOGY | Facility: HOSPITAL | Age: 59
Discharge: HOME | End: 2019-07-18
Attending: PSYCHIATRY & NEUROLOGY
Payer: OTHER GOVERNMENT

## 2019-07-18 ENCOUNTER — PATIENT MESSAGE (OUTPATIENT)
Dept: NEUROLOGY | Facility: CLINIC | Age: 59
End: 2019-07-18

## 2019-07-18 DIAGNOSIS — R51.9 NONINTRACTABLE EPISODIC HEADACHE, UNSPECIFIED HEADACHE TYPE: ICD-10-CM

## 2019-07-18 PROCEDURE — 70551 MRI BRAIN STEM W/O DYE: CPT | Mod: TC

## 2019-07-18 PROCEDURE — 70551 MRI BRAIN STEM W/O DYE: CPT | Mod: 26,,, | Performed by: RADIOLOGY

## 2019-07-18 PROCEDURE — 70551 MRI BRAIN WITHOUT CONTRAST: ICD-10-PCS | Mod: 26,,, | Performed by: RADIOLOGY

## 2019-07-21 ENCOUNTER — PATIENT MESSAGE (OUTPATIENT)
Dept: NEUROLOGY | Facility: CLINIC | Age: 59
End: 2019-07-21

## 2019-07-23 ENCOUNTER — TELEPHONE (OUTPATIENT)
Dept: ORTHOPEDICS | Facility: CLINIC | Age: 59
End: 2019-07-23

## 2019-07-23 NOTE — TELEPHONE ENCOUNTER
----- Message from Ivette Valdes sent at 7/23/2019  1:52 PM CDT -----  Contact: patient  She was calling to cancel her surgery and will call to r/s it the future, if you need to call her, call her at [phone].

## 2019-07-25 ENCOUNTER — TELEPHONE (OUTPATIENT)
Dept: NEUROLOGY | Facility: CLINIC | Age: 59
End: 2019-07-25

## 2019-07-25 NOTE — TELEPHONE ENCOUNTER
----- Message from Solo Rowell sent at 7/25/2019 11:33 AM CDT -----  Contact: Pt. [phone]  Needs Advice    Reason for call: The patient would like to speak to someone regarding her MRI results. Please contact the patient to discuss further.          Communication Preference: PHONE     Additional Information:

## 2019-07-26 NOTE — TELEPHONE ENCOUNTER
----- Message from Nuris Long sent at 7/26/2019  9:41 AM CDT -----  Contact: self @ [phone]  Pt says she is requrning Dr Mott's call.

## 2019-07-30 ENCOUNTER — TELEPHONE (OUTPATIENT)
Dept: ORTHOPEDICS | Facility: CLINIC | Age: 59
End: 2019-07-30

## 2019-07-30 NOTE — TELEPHONE ENCOUNTER
----- Message from Ethel Pelayo sent at 7/30/2019 10:08 AM CDT -----  Contact: Kahlil from Dr. Bell office   kahlil called regarding Marie Salcido and a referral that was requested and she wants to know what kind of referral is needed.    Dr. Bell # [phone]

## 2019-09-06 ENCOUNTER — TELEPHONE (OUTPATIENT)
Dept: ORTHOPEDICS | Facility: CLINIC | Age: 59
End: 2019-09-06

## 2019-09-06 NOTE — TELEPHONE ENCOUNTER
Dr. Bell Sent over Referral for Left Shoulder Pain. Patient unavailable. Left VM to call back for an appt.

## 2019-09-12 ENCOUNTER — CLINICAL SUPPORT (OUTPATIENT)
Dept: URGENT CARE | Facility: CLINIC | Age: 59
End: 2019-09-12
Payer: OTHER GOVERNMENT

## 2019-09-12 VITALS
WEIGHT: 216 LBS | HEART RATE: 71 BPM | BODY MASS INDEX: 33.9 KG/M2 | HEIGHT: 67 IN | SYSTOLIC BLOOD PRESSURE: 125 MMHG | DIASTOLIC BLOOD PRESSURE: 73 MMHG | TEMPERATURE: 99 F | RESPIRATION RATE: 16 BRPM | OXYGEN SATURATION: 98 %

## 2019-09-12 DIAGNOSIS — R09.82 POST-NASAL DRIP: ICD-10-CM

## 2019-09-12 DIAGNOSIS — J02.9 SORE THROAT: Primary | ICD-10-CM

## 2019-09-12 DIAGNOSIS — J02.9 PHARYNGITIS, UNSPECIFIED ETIOLOGY: ICD-10-CM

## 2019-09-12 LAB
CTP QC/QA: YES
S PYO RRNA THROAT QL PROBE: NEGATIVE

## 2019-09-12 PROCEDURE — 99214 PR OFFICE/OUTPT VISIT, EST, LEVL IV, 30-39 MIN: ICD-10-PCS | Mod: 25,S$GLB,, | Performed by: NURSE PRACTITIONER

## 2019-09-12 PROCEDURE — 96372 PR INJECTION,THERAP/PROPH/DIAG2ST, IM OR SUBCUT: ICD-10-PCS | Mod: S$GLB,,, | Performed by: NURSE PRACTITIONER

## 2019-09-12 PROCEDURE — 87880 POCT RAPID STREP A: ICD-10-PCS | Mod: QW,,, | Performed by: NURSE PRACTITIONER

## 2019-09-12 PROCEDURE — 99214 OFFICE O/P EST MOD 30 MIN: CPT | Mod: 25,S$GLB,, | Performed by: NURSE PRACTITIONER

## 2019-09-12 PROCEDURE — 87880 STREP A ASSAY W/OPTIC: CPT | Mod: QW,,, | Performed by: NURSE PRACTITIONER

## 2019-09-12 PROCEDURE — 96372 THER/PROPH/DIAG INJ SC/IM: CPT | Mod: S$GLB,,, | Performed by: NURSE PRACTITIONER

## 2019-09-12 RX ORDER — FLUTICASONE PROPIONATE 50 MCG
2 SPRAY, SUSPENSION (ML) NASAL 2 TIMES DAILY
Qty: 1 BOTTLE | Refills: 0 | Status: SHIPPED | OUTPATIENT
Start: 2019-09-12 | End: 2021-12-16

## 2019-09-12 RX ORDER — CETIRIZINE HYDROCHLORIDE 10 MG/1
10 TABLET ORAL DAILY
Qty: 30 TABLET | Refills: 2 | Status: SHIPPED | OUTPATIENT
Start: 2019-09-12 | End: 2021-12-16

## 2019-09-12 RX ORDER — PREDNISONE 20 MG/1
20 TABLET ORAL 2 TIMES DAILY
Qty: 10 TABLET | Refills: 0 | Status: SHIPPED | OUTPATIENT
Start: 2019-09-12 | End: 2019-09-17

## 2019-09-12 RX ORDER — DEXAMETHASONE SODIUM PHOSPHATE 100 MG/10ML
10 INJECTION INTRAMUSCULAR; INTRAVENOUS
Status: COMPLETED | OUTPATIENT
Start: 2019-09-12 | End: 2019-09-12

## 2019-09-12 RX ADMIN — DEXAMETHASONE SODIUM PHOSPHATE 10 MG: 100 INJECTION INTRAMUSCULAR; INTRAVENOUS at 04:09

## 2019-09-12 NOTE — PATIENT INSTRUCTIONS
Self-Care for Sore Throats    Sore throats happen for many reasons, such as colds, allergies, and infections caused by viruses or bacteria. In any case, your throat becomes red and sore. Your goal for self-care is to reduce your discomfort while giving your throat a chance to heal.  Moisten and soothe your throat  Tips include the following:  · Try a sip of water first thing after waking up.  · Keep your throat moist by drinking 6 or more glasses of clear liquids every day.  · Run a cool-air humidifier in your room overnight.  · Avoid cigarette smoke.   · Suck on throat lozenges, cough drops, hard candy, ice chips, or frozen fruit-juice bars. Use the sugar-free versions if your diet or medical condition requires them.  Gargle to ease irritation  Gargling every hour or 2 can ease irritation. Try gargling with 1 of these solutions:  · 1/4 teaspoon of salt in 1/2 cup of warm water  · An over-the-counter anesthetic gargle  Use medicine for more relief  Over-the-counter medicine can reduce sore throat symptoms. Ask your pharmacist if you have questions about which medicine to use:  · Ease pain with anesthetic sprays. Aspirin or an aspirin substitute also helps. Remember, never give aspirin to anyone 18 or younger, or if you are already taking blood thinners.   · For sore throats caused by allergies, try antihistamines to block the allergic reaction.  · Remember: unless a sore throat is caused by a bacterial infection, antibiotics wont help you.  Prevent future sore throats  Prevention tips include the following:  · Stop smoking or reduce contact with secondhand smoke. Smoke irritates the tender throat lining.  · Limit contact with pets and with allergy-causing substances, such as pollen and mold.  · When youre around someone with a sore throat or cold, wash your hands often to keep viruses or bacteria from spreading.  · Dont strain your vocal cords.  Call your healthcare provider  Contact your healthcare provider if  you have:  · A temperature over 101°F (38.3°C)  · White spots on the throat  · Great difficulty swallowing  · Trouble breathing  · A skin rash  · Recent exposure to someone else with strep bacteria  · Severe hoarseness and swollen glands in the neck or jaw   Date Last Reviewed: 8/1/2016  © 3773-0026 [employer]. [address] [place] [address]. All rights reserved. This information is not intended as a substitute for professional medical care. Always follow your healthcare professional's instructions.        Viral Upper Respiratory Illness (Adult)  You have a viral upper respiratory illness (URI), which is another term for the common cold. This illness is contagious during the first few days. It is spread through the air by coughing and sneezing. It may also be spread by direct contact (touching the sick person and then touching your own eyes, nose, or mouth). Frequent handwashing will decrease risk of spread. Most viral illnesses go away within 7 to 10 days with rest and simple home remedies. Sometimes the illness may last for several weeks. Antibiotics will not kill a virus, and they are generally not prescribed for this condition.    Home care  · If symptoms are severe, rest at home for the first 2 to 3 days. When you resume activity, don't let yourself get too tired.  · Avoid being exposed to cigarette smoke (yours or others).  · You may use acetaminophen or ibuprofen to control pain and fever, unless another medicine was prescribed. (Note: If you have chronic liver or kidney disease, have ever had a stomach ulcer or gastrointestinal bleeding, or are taking blood-thinning medicines, talk with your healthcare provider before using these medicines.) Aspirin should never be given to anyone under 18 years of age who is ill with a viral infection or fever. It may cause severe liver or brain damage.  · Your appetite may be poor, so a light diet is fine. Avoid dehydration by drinking 6 to 8  glasses of fluids per day (water, soft drinks, juices, tea, or soup). Extra fluids will help loosen secretions in the nose and lungs.  · Over-the-counter cold medicines will not shorten the length of time youre sick, but they may be helpful for the following symptoms: cough, sore throat, and nasal and sinus congestion. (Note: Do not use decongestants if you have high blood pressure.)  Follow-up care  Follow up with your healthcare provider, or as advised.  When to seek medical advice  Call your healthcare provider right away if any of these occur:  · Cough with lots of colored sputum (mucus)  · Severe headache; face, neck, or ear pain  · Difficulty swallowing due to throat pain  · Fever of 100.4°F (38°C)  Call 911, or get immediate medical care  Call emergency services right away if any of these occur:  · Chest pain, shortness of breath, wheezing, or difficulty breathing  · Coughing up blood  · Inability to swallow due to throat pain  Date Last Reviewed: 9/13/2015  © 1412-2592 [employer]. [address] [place] [address], [place] [address]. All rights reserved. This information is not intended as a substitute for professional medical care. Always follow your healthcare professional's instructions.

## 2019-09-12 NOTE — PROGRESS NOTES
"Subjective:       Patient ID: Marie Salcido is a 59 y.o. female.    Vitals:  height is 5' 6.5" (1.689 m) and weight is 98 kg (216 lb). Her oral temperature is 98.9 °F (37.2 °C). Her blood pressure is 125/73 and her pulse is 71. Her respiration is 16 and oxygen saturation is 98%.     Chief Complaint: Sore Throat    Sore Throat    This is a new problem. Episode onset: 09/05/2019. The problem has been unchanged. Associated symptoms include congestion and coughing. Pertinent negatives include no diarrhea, headaches, shortness of breath or vomiting. She has tried NSAIDs for the symptoms.       Constitution: Positive for fatigue. Negative for chills and fever.   HENT: Positive for congestion, postnasal drip, sinus pain, sinus pressure and sore throat.    Neck: Negative for painful lymph nodes.   Cardiovascular: Negative for chest pain and leg swelling.   Eyes: Negative for double vision and blurred vision.   Respiratory: Positive for cough. Negative for shortness of breath.    Gastrointestinal: Negative for nausea, vomiting and diarrhea.   Genitourinary: Negative for dysuria, frequency, urgency and history of kidney stones.   Musculoskeletal: Negative for joint pain, joint swelling, muscle cramps and muscle ache.   Skin: Negative for color change, pale, rash and bruising.   Allergic/Immunologic: Negative for seasonal allergies.   Neurological: Negative for dizziness, history of vertigo, light-headedness, passing out and headaches.   Hematologic/Lymphatic: Negative for swollen lymph nodes.   Psychiatric/Behavioral: Negative for nervous/anxious, sleep disturbance and depression. The patient is not nervous/anxious.        Objective:      Physical Exam   Constitutional: She is oriented to person, place, and time. Vital signs are normal. She appears well-developed and well-nourished. She is cooperative.   HENT:   Head: Normocephalic.   Right Ear: Hearing, external ear and ear canal normal. A middle ear effusion is present. "   Left Ear: Hearing, external ear and ear canal normal. A middle ear effusion is present.   Nose: Mucosal edema and rhinorrhea present.   Mouth/Throat: Uvula is midline and mucous membranes are normal. Posterior oropharyngeal erythema (cobblestonin) present.   Eyes: Pupils are equal, round, and reactive to light. Conjunctivae, EOM and lids are normal.   Neck: Trachea normal, normal range of motion, full passive range of motion without pain and phonation normal. Neck supple.   Cardiovascular: Normal rate, regular rhythm, normal heart sounds, intact distal pulses and normal pulses.   Pulmonary/Chest: Effort normal and breath sounds normal.   Abdominal: Soft. Normal appearance, normal aorta and bowel sounds are normal. There is no tenderness.   Musculoskeletal: Normal range of motion.   Neurological: She is alert and oriented to person, place, and time. She has normal strength. GCS eye subscore is 4. GCS verbal subscore is 5. GCS motor subscore is 6.   Skin: Skin is warm, dry and intact. Capillary refill takes less than 2 seconds.   Psychiatric: She has a normal mood and affect. Her speech is normal and behavior is normal. Judgment and thought content normal. Cognition and memory are normal.       Assessment:       1. Sore throat    2. Pharyngitis, unspecified etiology    3. Post-nasal drip        Plan:         Sore throat  -     POCT rapid strep A    Pharyngitis, unspecified etiology    Post-nasal drip    Other orders  -     dexamethasone injection 10 mg  -     cetirizine (ZYRTEC) 10 MG tablet; Take 1 tablet (10 mg total) by mouth once daily.  Dispense: 30 tablet; Refill: 2  -     fluticasone propionate (FLONASE) 50 mcg/actuation nasal spray; 2 sprays (100 mcg total) by Each Nostril route 2 (two) times daily.  Dispense: 1 Bottle; Refill: 0  -     predniSONE (DELTASONE) 20 MG tablet; Take 1 tablet (20 mg total) by mouth 2 (two) times daily. for 5 days  Dispense: 10 tablet; Refill: 0

## 2019-09-30 DIAGNOSIS — M25.512 LEFT SHOULDER PAIN, UNSPECIFIED CHRONICITY: Primary | ICD-10-CM

## 2019-10-01 ENCOUNTER — HOSPITAL ENCOUNTER (OUTPATIENT)
Dept: RADIOLOGY | Facility: HOSPITAL | Age: 59
Discharge: HOME | End: 2019-10-01
Attending: ORTHOPAEDIC SURGERY
Payer: OTHER GOVERNMENT

## 2019-10-01 ENCOUNTER — OFFICE VISIT (OUTPATIENT)
Dept: ORTHOPEDICS | Facility: CLINIC | Age: 59
End: 2019-10-01
Payer: OTHER GOVERNMENT

## 2019-10-01 VITALS
HEART RATE: 64 BPM | SYSTOLIC BLOOD PRESSURE: 137 MMHG | HEIGHT: 67 IN | BODY MASS INDEX: 33.9 KG/M2 | WEIGHT: 216 LBS | DIASTOLIC BLOOD PRESSURE: 85 MMHG

## 2019-10-01 DIAGNOSIS — M25.512 ACUTE PAIN OF LEFT SHOULDER: Primary | ICD-10-CM

## 2019-10-01 DIAGNOSIS — M25.512 LEFT SHOULDER PAIN, UNSPECIFIED CHRONICITY: ICD-10-CM

## 2019-10-01 PROCEDURE — 73030 X-RAY EXAM OF SHOULDER: CPT | Mod: 26,LT,, | Performed by: RADIOLOGY

## 2019-10-01 PROCEDURE — 99213 OFFICE O/P EST LOW 20 MIN: CPT | Mod: PBBFAC,25,PN | Performed by: ORTHOPAEDIC SURGERY

## 2019-10-01 PROCEDURE — 73030 XR SHOULDER COMPLETE 2 OR MORE VIEWS LEFT: ICD-10-PCS | Mod: 26,LT,, | Performed by: RADIOLOGY

## 2019-10-01 PROCEDURE — 99203 PR OFFICE/OUTPT VISIT, NEW, LEVL III, 30-44 MIN: ICD-10-PCS | Mod: S$PBB,,, | Performed by: ORTHOPAEDIC SURGERY

## 2019-10-01 PROCEDURE — 99203 OFFICE O/P NEW LOW 30 MIN: CPT | Mod: S$PBB,,, | Performed by: ORTHOPAEDIC SURGERY

## 2019-10-01 PROCEDURE — 99999 PR PBB SHADOW E&M-EST. PATIENT-LVL III: ICD-10-PCS | Mod: PBBFAC,,, | Performed by: ORTHOPAEDIC SURGERY

## 2019-10-01 PROCEDURE — 99999 PR PBB SHADOW E&M-EST. PATIENT-LVL III: CPT | Mod: PBBFAC,,, | Performed by: ORTHOPAEDIC SURGERY

## 2019-10-01 PROCEDURE — 73030 X-RAY EXAM OF SHOULDER: CPT | Mod: TC,PN,LT

## 2019-10-01 RX ORDER — AMOXICILLIN 500 MG
CAPSULE ORAL DAILY
COMMUNITY
End: 2022-06-01 | Stop reason: SDUPTHER

## 2019-10-01 RX ORDER — METHYLPREDNISOLONE 4 MG/1
TABLET ORAL
Qty: 1 PACKAGE | Refills: 0 | Status: SHIPPED | OUTPATIENT
Start: 2019-10-01 | End: 2021-12-16

## 2019-10-01 NOTE — PROGRESS NOTES
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 Par[facility]ino [facility]    cyst removed from right breast      LASER LAPAROSCOPY      TUBAL LIGATION         Current Outpatient Medications   Medication Sig    aspirin (ECOTRIN) 81 MG EC tablet Take 81 mg by mouth once daily.    cetirizine (ZYRTEC) 10 MG tablet Take 1 tablet (10 mg total) by mouth once daily.    fish oil-omega-3 fatty acids 300-1,000 mg capsule Take by mouth once daily.    atorvastatin (LIPITOR) 40 MG tablet Take 1 tablet (40 mg total) by mouth once daily.    famotidine (PEPCID) 20 MG tablet Take 1 tablet (20 mg total) by mouth 2 (two) times daily.    fluticasone propionate (FLONASE) 50 mcg/actuation nasal spray 2 sprays (100 mcg total) by Each Nostril route 2 (two) times daily. (Patient not taking: Reported on 10/1/2019)    lisinopril 10 MG tablet Take 1 tablet (10 mg total) by mouth once daily.    pantoprazole (PROTONIX) 40 MG tablet Take 1 tablet (40 mg total) by mouth once daily.     No current facility-administered medications for this visit.        Review of patient's allergies indicates:   Allergen Reactions    Codeine Rash       Family History   Problem Relation Age of Onset    Cancer Maternal Grandmother     Breast cancer Sister     Asthma Sister     Arrhythmia Mother     Diabetes Mellitus Neg Hx        Social History     Socioeconomic History    Marital status:      Spouse name: Not on file    Number of children: Not on file    Years of education: Not on file    Highest education level: Not on file   Occupational History    Not on file   Social Needs    Financial resource strain: Not on file    Food insecurity:     Worry:  Not on file     Inability: Not on file    Transportation needs:     Medical: Not on file     Non-medical: Not on file   Tobacco Use    Smoking status: Current Every Day Smoker   Substance and Sexual Activity    Alcohol use: Yes     Alcohol/week: 1.0 standard drinks     Types: 1 Glasses of wine per week    Drug use: No    Sexual activity: Yes     Partners: Male   Lifestyle    Physical activity:     Days per week: Not on file     Minutes per session: Not on file    Stress: Not on file   Relationships    Social connections:     Talks on phone: Not on file     Gets together: Not on file     Attends Christian service: Not on file     Active member of club or organization: Not on file     Attends meetings of clubs or organizations: Not on file     Relationship status: Not on file   Other Topics Concern    Not on file   Social History Narrative    Not on file       Chief Complaint:   Chief Complaint   Patient presents with    Left Shoulder - Pain, Initial Visit     Left Shoulder Pain for about 4 months. She states she was helping her   a sofa then she started having pain. Her PL is 2/10 unless she is raising her arm then PL 8/10 depending on Direction of use.          History of present illness:    This is a 59 y.o. year old female who complains of patient has a history of about 4 months of left shoulder pain she was helping her   a sofa and started having pain pain is measured to be 2/10 in less she raises her arm and then he can go as high as 8/10 patient has a recent MRI which was performed on the left shoulder which shows a possible rotator cuff tear  Review of Systems:    Constitution: Denies chills, fever, and sweats.  HENT: Denies headaches or blurry vision.  Cardiovascular: Denies chest pain or irregular heart beat.  Respiratory: Denies cough or shortness of breath.  Gastrointestinal: Denies abdominal pain, nausea, or vomiting.  Musculoskeletal:  Denies muscle  "cramps.  Neurological: Denies dizziness or focal weakness.  Psychiatric/Behavioral: Normal mental status.  Hematologic/Lymphatic: Denies bleeding problem or easy bruising/bleeding.  Skin: Denies rash or suspicious lesions.    Examination:    Vital Signs:    Vitals:    10/01/19 0929   BP: 137/85   Pulse: 64   Weight: 98 kg (216 lb)   Height: 5' 6.5" (1.689 m)   PainSc:   2   PainLoc: Shoulder       Body mass index is 34.34 kg/m².    This a well-developed, well nourished patient in no acute distress.    Alert and oriented x 3 and cooperative to examination.       Physical Exam:  Left shoulder-patient has full active and passive range of motion of left shoulder she has no instability she has pain and weakness to abduction strength of the left shoulder    Imaging:  AP and lateral x-ray of the left shoulder was negative patient is an MRI of the left shoulder which showed evidence of tendinitis of the supraspinatus tendon with a moderate grade surface tear of the anterior mid and posterior fibers there was also some subpatellar deltoid bursitis some mild arthritic changes of the acromioclavicular joint possibly causing some subacromial impingement       Assessment:  Mid grade partial tear of the rotator cuff of the left shoulder with possible impingement    Plan:  Discussed with the patient our options which would include an injection in the subacromial space was some Kenalog also of some anti-inflammatory medications by mouth or proceed with surgery for arthroscopy of the left shoulder with acromioplasty and possible repair of the rotator cuff tear. Patient does not wish to have surgery nor does she want an injection she only wants some anti-inflammatory medications and a start her on a Medrol Dosepak and see if we can obtain the images of the MRI themselves and see her back in about 3 weeks      DISCLAIMER: This note may have been dictated using voice recognition software and may contain grammatical errors.     NOTE: " Consult report sent to referring provider via [employer] EMR.

## 2019-10-01 NOTE — LETTER
October 1, 2019      Yannick Bell MD  [address]  [place] [address]           [address]  [place] [address]  Phone: [phone]          Patient: Marie Salcido   MR Number: [id number]   YOB: 1960   Date of Visit: 10/1/2019       Dear Dr. Yannick Bell:    Thank you for referring Marie Salcido to me for evaluation. Attached you will find relevant portions of my assessment and plan of care.    If you have questions, please do not hesitate to call me. I look forward to following Marie Salcido along with you.    Sincerely,    Petar Jackson MD    Enclosure  CC:  No Recipients    If you would like to receive this communication electronically, please contact [email or web address] or [phone] to request more information on [employer]. Link access.    For providers and/or their staff who would like to refer a patient to [facility], please contact us through our one-stop-shop provider referral line, [facility], at [phone].    If you feel you have received this communication in error or would no longer like to receive these types of communications, please e-mail [email or web address]

## 2019-11-11 ENCOUNTER — HOSPITAL ENCOUNTER (EMERGENCY)
Facility: HOSPITAL | Age: 59
Discharge: HOME | End: 2019-11-11
Attending: EMERGENCY MEDICINE
Payer: OTHER GOVERNMENT

## 2019-11-11 VITALS
DIASTOLIC BLOOD PRESSURE: 71 MMHG | OXYGEN SATURATION: 100 % | SYSTOLIC BLOOD PRESSURE: 162 MMHG | WEIGHT: 240 LBS | BODY MASS INDEX: 37.67 KG/M2 | HEART RATE: 58 BPM | TEMPERATURE: 98 F | RESPIRATION RATE: 18 BRPM | HEIGHT: 67 IN

## 2019-11-11 DIAGNOSIS — R42 DIZZINESS: Primary | ICD-10-CM

## 2019-11-11 DIAGNOSIS — R07.9 CHEST PAIN: ICD-10-CM

## 2019-11-11 LAB
ALBUMIN SERPL BCP-MCNC: 4.1 G/DL (ref 3.5–5.2)
ALP SERPL-CCNC: 45 U/L (ref 55–135)
ALT SERPL W/O P-5'-P-CCNC: 26 U/L (ref 10–44)
ANION GAP SERPL CALC-SCNC: 2 MMOL/L (ref 8–16)
AST SERPL-CCNC: 21 U/L (ref 10–40)
BASOPHILS # BLD AUTO: 0.03 K/UL (ref 0–0.2)
BASOPHILS NFR BLD: 0.7 % (ref 0–1.9)
BILIRUB SERPL-MCNC: 0.9 MG/DL (ref 0.1–1)
BNP SERPL-MCNC: 26 PG/ML (ref 0–99)
BUN SERPL-MCNC: 15 MG/DL (ref 6–20)
CALCIUM SERPL-MCNC: 8.6 MG/DL (ref 8.7–10.5)
CHLORIDE SERPL-SCNC: 108 MMOL/L (ref 95–110)
CO2 SERPL-SCNC: 29 MMOL/L (ref 23–29)
CREAT SERPL-MCNC: 0.8 MG/DL (ref 0.5–1.4)
DIFFERENTIAL METHOD: ABNORMAL
EOSINOPHIL # BLD AUTO: 0.1 K/UL (ref 0–0.5)
EOSINOPHIL NFR BLD: 1.9 % (ref 0–8)
ERYTHROCYTE [DISTWIDTH] IN BLOOD BY AUTOMATED COUNT: 16.5 % (ref 11.5–14.5)
EST. GFR  (AFRICAN AMERICAN): >60 ML/MIN/1.73 M^2
EST. GFR  (NON AFRICAN AMERICAN): >60 ML/MIN/1.73 M^2
GLUCOSE SERPL-MCNC: 99 MG/DL (ref 70–110)
HCT VFR BLD AUTO: 45.7 % (ref 37–48.5)
HGB BLD-MCNC: 13.6 G/DL (ref 12–16)
IMM GRANULOCYTES # BLD AUTO: 0.01 K/UL (ref 0–0.04)
IMM GRANULOCYTES NFR BLD AUTO: 0.2 % (ref 0–0.5)
LYMPHOCYTES # BLD AUTO: 1.4 K/UL (ref 1–4.8)
LYMPHOCYTES NFR BLD: 34.2 % (ref 18–48)
MCH RBC QN AUTO: 20.3 PG (ref 27–31)
MCHC RBC AUTO-ENTMCNC: 29.8 G/DL (ref 32–36)
MCV RBC AUTO: 68 FL (ref 82–98)
MONOCYTES # BLD AUTO: 0.3 K/UL (ref 0.3–1)
MONOCYTES NFR BLD: 8 % (ref 4–15)
NEUTROPHILS # BLD AUTO: 2.3 K/UL (ref 1.8–7.7)
NEUTROPHILS NFR BLD: 55 % (ref 38–73)
NRBC BLD-RTO: 0 /100 WBC
PLATELET # BLD AUTO: 288 K/UL (ref 150–350)
PMV BLD AUTO: 10.9 FL (ref 9.2–12.9)
POTASSIUM SERPL-SCNC: 4.1 MMOL/L (ref 3.5–5.1)
PROT SERPL-MCNC: 7.3 G/DL (ref 6–8.4)
RBC # BLD AUTO: 6.71 M/UL (ref 4–5.4)
SODIUM SERPL-SCNC: 139 MMOL/L (ref 136–145)
TROPONIN I SERPL DL<=0.01 NG/ML-MCNC: <0.03 NG/ML (ref 0.02–0.04)
WBC # BLD AUTO: 4.12 K/UL (ref 3.9–12.7)

## 2019-11-11 PROCEDURE — 63600175 PHARM REV CODE 636 W HCPCS: Performed by: NURSE PRACTITIONER

## 2019-11-11 PROCEDURE — 84484 ASSAY OF TROPONIN QUANT: CPT

## 2019-11-11 PROCEDURE — 80053 COMPREHEN METABOLIC PANEL: CPT

## 2019-11-11 PROCEDURE — 99285 EMERGENCY DEPT VISIT HI MDM: CPT | Mod: 25

## 2019-11-11 PROCEDURE — 93005 ELECTROCARDIOGRAM TRACING: CPT

## 2019-11-11 PROCEDURE — 96374 THER/PROPH/DIAG INJ IV PUSH: CPT

## 2019-11-11 PROCEDURE — 83880 ASSAY OF NATRIURETIC PEPTIDE: CPT

## 2019-11-11 PROCEDURE — 85025 COMPLETE CBC W/AUTO DIFF WBC: CPT

## 2019-11-11 PROCEDURE — 25000003 PHARM REV CODE 250: Performed by: NURSE PRACTITIONER

## 2019-11-11 RX ORDER — ASPIRIN 325 MG
325 TABLET ORAL
Status: COMPLETED | OUTPATIENT
Start: 2019-11-11 | End: 2019-11-11

## 2019-11-11 RX ORDER — ONDANSETRON 2 MG/ML
4 INJECTION INTRAMUSCULAR; INTRAVENOUS
Status: DISCONTINUED | OUTPATIENT
Start: 2019-11-11 | End: 2019-11-11

## 2019-11-11 RX ORDER — ONDANSETRON 2 MG/ML
4 INJECTION INTRAMUSCULAR; INTRAVENOUS ONCE
Status: COMPLETED | OUTPATIENT
Start: 2019-11-11 | End: 2019-11-11

## 2019-11-11 RX ORDER — METOPROLOL TARTRATE 25 MG/1
25 TABLET, FILM COATED ORAL 2 TIMES DAILY
Status: ON HOLD | COMMUNITY
End: 2020-08-20

## 2019-11-11 RX ADMIN — ASPIRIN 325 MG ORAL TABLET 325 MG: 325 PILL ORAL at 10:11

## 2019-11-11 RX ADMIN — SODIUM CHLORIDE 1000 ML: 0.9 INJECTION, SOLUTION INTRAVENOUS at 10:11

## 2019-11-11 RX ADMIN — ONDANSETRON 4 MG: 2 INJECTION INTRAMUSCULAR; INTRAVENOUS at 11:11

## 2019-11-11 NOTE — ED PROVIDER NOTES
"Encounter Date: 11/11/2019       History     Chief Complaint   Patient presents with    Dizziness     3 DAYS    Hypertension     > 1 WEEK     Presents with complaint of dizziness worse with standing and turning her head  Onset Saturday pt reports she was on Metaprolol for " months" but she felt It made her dizzy and her BP was "better" so she discontinued it.  She states her BP was elevated so she began again to take the Metoprolol   She also reports for the past week having on and off squeezing pain to the chest  Denies SOB or chest pain at present  She does have an appt to see her PCP regarding the BP Med        Review of patient's allergies indicates:   Allergen Reactions    Codeine Rash     Past Medical History:   Diagnosis Date    Anemia     Coronary artery disease     Hyperlipidemia     Hypertension     Silent myocardial infarction     Sleep apnea      Past Surgical History:   Procedure Laterality Date    CORONARY ARTERY BYPASS GRAFT  10/4/13    2-vessel CABG LIMA-LAD, left SVG-OM2 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 [facility]rino [facility]    cyst removed from right breast      LASER LAPAROSCOPY      TUBAL LIGATION       Family History   Problem Relation Age of Onset    Cancer Maternal Grandmother     Breast cancer Sister     Asthma Sister     Arrhythmia Mother     Diabetes Mellitus Neg Hx      Social History     Tobacco Use    Smoking status: Current Every Day Smoker   Substance Use Topics    Alcohol use: Yes     Alcohol/week: 1.0 standard drinks     Types: 1 Glasses of wine per week    Drug use: No     Review of Systems   Respiratory: Positive for chest tightness. Negative for shortness of breath.    Cardiovascular: Negative for chest pain, palpitations and leg swelling.   Gastrointestinal: Positive for nausea. Negative for diarrhea and vomiting.   Neurological: Positive for dizziness. Negative for syncope, speech difficulty, weakness, numbness and headaches. "       Physical Exam     Initial Vitals [11/11/19 0859]   BP Pulse Resp Temp SpO2   (!) 128/90 70 18 98.6 °F (37 °C) 100 %      MAP       --         Physical Exam    Constitutional: She appears well-developed and well-nourished.   HENT:   Head: Normocephalic and atraumatic.   Eyes: Conjunctivae are normal.   Neck: Normal range of motion.   Cardiovascular: Normal rate and regular rhythm.   Pulmonary/Chest: Breath sounds normal. No respiratory distress.   Abdominal: Soft. Bowel sounds are normal.   Musculoskeletal: Normal range of motion.   Neurological: She is alert and oriented to person, place, and time. No sensory deficit. GCS score is 15. GCS eye subscore is 4. GCS verbal subscore is 5. GCS motor subscore is 6.   Skin: Skin is warm and dry.   Psychiatric: She has a normal mood and affect. Thought content normal.         ED Course   Procedures  Labs Reviewed   CBC W/ AUTO DIFFERENTIAL - Abnormal; Notable for the following components:       Result Value    RBC 6.71 (*)     Mean Corpuscular Volume 68 (*)     Mean Corpuscular Hemoglobin 20.3 (*)     Mean Corpuscular Hemoglobin Conc 29.8 (*)     RDW 16.5 (*)     All other components within normal limits   COMPREHENSIVE METABOLIC PANEL - Abnormal; Notable for the following components:    Calcium 8.6 (*)     Alkaline Phosphatase 45 (*)     Anion Gap 2 (*)     All other components within normal limits   TROPONIN I   B-TYPE NATRIURETIC PEPTIDE   TROPONIN I        ECG Results          EKG 12-lead (In process)  Result time 11/11/19 10:34:25    In process by Interface, Lab In [facility] (11/11/19 10:34:25)                 Narrative:    Test Reason : R07.9,    Vent. Rate : 055 BPM     Atrial Rate : 055 BPM     P-R Int : 180 ms          QRS Dur : 084 ms      QT Int : 444 ms       P-R-T Axes : 053 044 068 degrees     QTc Int : 424 ms    Sinus bradycardia  Otherwise normal ECG  When compared with ECG of 02-JUN-2019 21:53,  No significant change was found    Referred By: MECHE    SELF           Confirmed By:                             Imaging Results          X-Ray Chest PA And Lateral (Final result)  Result time 11/11/19 10:18:34    Final result by Marybel Blanton MD (11/11/19 10:18:34)                 Impression:      Normal chest.      Electronically signed by: Marybel Blanton MD  Date:    11/11/2019  Time:    10:18             Narrative:    EXAMINATION:  XR CHEST PA AND LATERAL    CLINICAL HISTORY:  Chest pain, unspecified    FINDINGS:  PA and lateral chest is compared to 06/02/2019 shows normal cardiomediastinal silhouette. The patient has had a prior median sternotomy.    Lungs are clear. Pulmonary vasculature is normal. No acute osseous abnormality.                                                                 Clinical Impression:       ICD-10-CM ICD-9-CM   1. Dizziness R42 780.4   2. Chest pain R07.9 786.50                             Colleen Jackson NP  11/11/19 1151

## 2020-01-01 NOTE — PROGRESS NOTES
Patient:   ANDRE DAVIS            MRN: [id number]            FIN: [id number]              Age:   5 days     Sex:  MALE     :  20   Associated Diagnoses:   None   Author:   CORTNEY BISWAS       :  2020 11:24  AGE:  5 Days  Duration of Current Hospitalization:  5 Days  Gestational Age at Birth:  37 1/7   Corrected Gestational Age:  37 6/7   GENERAL INFORMATION:   Date of Birth: [birth year]  Time of Birth: 1124  Gestational age at birth: 37 weeks 1 days  BIRTH MEASUREMENTS:  Birth weight: 2800 grams   72%ile -- A GA  Length: 49.5 cm  Head circumference: 33.5 cm  MATERNAL HISTORY   Maternal age: 28   7                  Para 1->2  Ethnicity: Black/-American  CURRENT PREGNANCY:   Maternal Past Medical History: seizure disorder, hypothyroid, asthma, hypoglycemia, cHTN  Medications during pregnancy: PNV, glucose tabs, vit b3, vit d, lamectal 200mg BID, keppra 1000mg BID, valproic acid 200mg BID, ASA, albuterol, budesonide  Complications during pregnancy: seizure disorder on meds, previous IUFD  Social History:  denies tobacco, alcohol, or illicits  PRENATAL LABS:   Blood type O+, antibody  negative  HIV negative,   RPR non reactive  Rubella immune  GC/Chlamydia negative   Hep B negative  GBS positive, treated with ampicillin x 3 >2 hrs of delivery   HISTORY   Medications during delivery: ampicillin  Rupture of membranes (Date/Time):  0456 (6.5 hr)   Fluid:  clear   INFORMATION   MODE OF DELIVERY:     Delivery Type:   Vaginal, Spontaneous  If , reason for section: n/a  Complications during delivery: none  Delayed cord clamping (Yes / No): yes  Resuscitation:  Routine  Vitamin K and Erythromycin oint:  Yes  Infant's blood type: O+, antibody negative  APGAR: 9 / 9      Vitals between:   2020 11:53:10   TO   2020 11:53:10                   LAST RESULT MINIMUM MAXIMUM  Temperature 36.5 36.5 36.7  Heart Rate 132 132 140  Respiratory Rate 40 40  [facility]    Subjective:     Chief Complaint:   Chief Complaint   Patient presents with    Right Hand - Pain     Right hand pinky pain/ lump x a while. States that her finger only hurts when she touches it.  States that it does not bother her when she uses it.        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 Posadas [facility]    CORONARY ARTERY BYPASS GRAFT (CABG) Bilateral 10/4/2013    [facility] at [facility] OR 2ND F[facility]    cyst removed from right breast      HEART CATH-LEFT Left 3/21/2017    Performed by Luís Lobo MD at [facility] CATH LAB    LASER LAPAROSCOPY      TUBAL LIGATION         Current Outpatient Medications   Medication Sig    aspirin (ECOTRIN) 81 MG EC tablet Take 81 mg by mouth once daily.    atorvastatin (LIPITOR) 40 MG tablet Take 1 tablet (40 mg total) by mouth once daily.    famotidine (PEPCID) 20 MG tablet Take 1 tablet (20 mg total) by mouth 2 (two) times daily.    lisinopril 10 MG tablet Take 1 tablet (10 mg total) by mouth once daily.    pantoprazole (PROTONIX) 40 MG tablet Take 1 tablet (40 mg total) by mouth once daily.     No current facility-administered medications for this visit.        Review of patient's allergies indicates:   Allergen Reactions    Codeine Rash       Family History   Problem Relation Age of Onset    Cancer Maternal Grandmother     Breast cancer Sister     Asthma Sister     Arrhythmia Mother     Diabetes Mellitus Neg Hx        Social History     Socioeconomic History    Marital status:      Spouse name: Not on file    Number of children: Not on file    Years of education: Not on file    Highest education level: Not  42  General: No acute distress, well appearing, responds to stimuli appropriately  Eye: Normal conjunctiva  HENT: Normocephalic, Anterior fontanelle open/soft/flat, red reflex equal and present bilaterally. Palate/lip intact.  Neck: Supple  Respiratory: Lungs are clear to auscultation, Respirations are non-labored, Breath sounds are equal, Symmetrical chest wall expansion  Cardiovascular: Normal rate, Regular rhythm, No murmur, Normal peripheral perfusion  Gastrointestinal: Soft, Non-tender, Non-distended, Anus appears patent  Genitourinary: Normal genitalia for age and sex, No lesions. Testes desended bilaterally. Circumcised.  Musculoskeletal: No swelling. No deformity. Negative ortolani and siu.  Skin: Moist, No pallor, No rash. No sacral dimple.   Neurologic: No focal deficits, appropriate tone.     No Qualifying Labs are resulted on this patient in the last 24 hours  Baby Boy \"Domingo\" Mitchell is a full term infant male who was born on  via  to a 28 year old GBS positive mother, rest of serologies negative. Initial concerns for hypothermia so partial SBI workup completed which has been negative to date. Patient now with normal temperatures and completed all routine  care. He remains hospitalized due to social concerns, pending DCFS investigation and establishment of discharge safety  plan.  1.) Routine  care  - Received Vit K and erythromycin  - Received Hepatis B vaccine  - 24 Hour Bilirubin 5.7 LIR zone, no further checks required. Monitor clinically.  - Passed CCHD screen  - Passed hearing screen  - s/p circumcision   2.) Hypothermia  - resolved  - Blood culture : no growth to date  3.) Maternal GBS exposure  - adequate intrapartum prophylaxis given  4.) Social concerns  - SW following  - DCFS case open and investigation pending  - F/u with mother's medical team regarding her projected disposition  FLUIDS, ELECTROLYTES AND NUTRITION:   Feeding: formula    Medications: Vitamin D  on file   Occupational History    Not on file   Social Needs    Financial resource strain: Not on file    Food insecurity:     Worry: Not on file     Inability: Not on file    Transportation needs:     Medical: Not on file     Non-medical: Not on file   Tobacco Use    Smoking status: Current Every Day Smoker   Substance and Sexual Activity    Alcohol use: Yes     Alcohol/week: 0.6 oz     Types: 1 Glasses of wine per week    Drug use: No    Sexual activity: Yes     Partners: Male   Lifestyle    Physical activity:     Days per week: Not on file     Minutes per session: Not on file    Stress: Not on file   Relationships    Social connections:     Talks on phone: Not on file     Gets together: Not on file     Attends Oriental orthodox service: Not on file     Active member of club or organization: Not on file     Attends meetings of clubs or organizations: Not on file     Relationship status: Not on file   Other Topics Concern    Not on file   Social History Narrative    Not on file       History of present illness: Patient comes in today for the right hand. She has a mass over the small digit over the PIP joint. It's been there for about 6 months. She's not sure how it started. It's mildly tender. It is getting bigger.      Review of Systems:    Constitution: Negative for chills, fever, and sweats.  Negative for unexplained weight loss.    HENT:  Negative for headaches and blurry vision.    Cardiovascular:Negative for chest pain or irregular heart beat. Negative for hypertension.    Respiratory:  Negative for cough and shortness of breath.    Gastrointestinal: Negative for abdominal pain, heartburn, melena, nausea, and vomitting.    Genitourinary:  Negative bladder incontinence and dysuria.    Musculoskeletal:  See HPI for details.     Neurological: Negative for numbness.    Psychiatric/Behavioral: Negative for depression.  The patient is not nervous/anxious.      Endocrine: Negative for  400 IU daily  Birth weight: 2800g   DOL 2 weight: 2750g (1.8% down from BW)  DOL 3 weight: 2685g (4.1% down from BW)  DOL 4 weight: 2655g (5.2% down from BW)  DOL 5 weight: 2700g (3.6% down from BW)  HYPERBILIRUBIN:   Risk factors for hyperbilirubinemia:  n/a  Bilirubin level at 24 hours: 5.7 mg/dl  Risk zone: Low  Intermediate Risk LL: 9.9 for medium risk infant due to gestational age; 8 for high risk infant due to gestational age and temp instability.  Plan: Continue to monitor clinically; continue regular feedings every 2-3 hours.  RISK FACTORS FOR SEPSIS:   Sepsis Risk Calculator (Infants > 34 weeks GA):  EOS risk at birth (per 1000/births): 0.16  EOS risk after clinical exam (per 1000/births): 0.07 based on well exam, 0.81 for equivocal exam  Clinical recommendation:  No culture, no antibiotics, routine vitals for well appearing infant; we did pursue lab work for equivocal exam with borderline low temps, all reassuring.  SCREENING TESTS / IMMUNIZATIONS:      Orders:     SCREEN [NEOS] ( In Process ) 2020 12:19      Results:      Vaccine Name: hepatitis B pediatric VACCINE ( Completed ) Ordered On 2020 12:29  -   Administered: 2020 13:59       Hearing Screen:   Pass Left, Pass Right, Done      Date and Time of Screenin20 14:42   CCHD Preductal SpO2:   98 %     CCHD Preductal SpO2 Site:   Right Hand     CCHD Postductal SpO2:   100 %     CCHD Postductal SpO2 Site:   Left foot     Cardiac Screening Results:   Initial, Pass     Cardiac Screening Intervention:        Reason Cardiac Screening not Applicable:        Date and Time of Documentation:   20 13:55   Car Seat Screening has not yet been documented.  SOCIAL:   Spoke to parents in-depth. All questions and concerns addressed. Parents expressed verbal understanding.   discharge anticipatory guidance discussed.  folder will be provided prior to discharge  PEDIATRICIAN: Dr. Kandis Rodriguez at [facility]  polyuria    Hematologic/Lymphatic: Negative for bleeding problem.  Does not bruise/bleed easily.    Skin: Negative for poor would healing and rash    Objective:      Physical Examination:    Vital Signs:    Vitals:    07/16/19 1014   BP: 122/70   Pulse: 80       Body mass index is 33.83 kg/m².    This a well-developed, well nourished patient in no acute distress.  They are alert and oriented and cooperative to examination.        Patient has a firm hypermobile mass that seems to move with the extensor gordon of the small finger. She has full range of motion the PIP and DIP joints. Full range of motion the contralateral hand. No noted masses on the contralateral side. Negative Tinel's. Negative Phalen's and reverse Phalen's. No visible masses of the elbow or wrist. Pertinent New Results:    XRAY Report / Interpretation:   AP lateral of the right small digit demonstrates degenerative changes of the DIP and PIP joints.      Assessment/Plan:      Firm mass associated with extensor tendon. 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 I've offered her excision of the mass. Risks and benefits of discussed with her in great detail. She understood and wished to proceed  This note was created using Dragon voice recognition software that occasionally misinterpreted phrases or words.         Kassandra Fields - office prefers family to call for appointment  FOLLOW UP APPOINTMENTS:   Jesse appointment: TBD, advised mother to call for follow up appointment  DISPO: pending DCSF clearance   Patient case and plan discussed with family, resident team and attending.  Deyanira Springer DO  Pediatric Resident - PGY2  [phone]  ATTENDING ADDENDUM:  I have seen and examined the patient at the bedside. I discussed the plan with residents. Patient's chart, labs, imaging, and medications reviewed. I agree with the resident note with any changes or additions noted below. I discussed the plan with the care giver(s) at the bedside, and they expressed understanding with the assessment and agree with the plan. Care giver(s) questions were answered.

## 2020-03-16 ENCOUNTER — HOSPITAL ENCOUNTER (EMERGENCY)
Facility: HOSPITAL | Age: 60
Discharge: HOME | End: 2020-03-16
Payer: OTHER GOVERNMENT

## 2020-03-16 VITALS
WEIGHT: 212 LBS | TEMPERATURE: 99 F | DIASTOLIC BLOOD PRESSURE: 80 MMHG | BODY MASS INDEX: 33.27 KG/M2 | RESPIRATION RATE: 16 BRPM | OXYGEN SATURATION: 100 % | HEIGHT: 67 IN | SYSTOLIC BLOOD PRESSURE: 177 MMHG | HEART RATE: 68 BPM

## 2020-03-16 PROCEDURE — 99281 EMR DPT VST MAYX REQ PHY/QHP: CPT

## 2020-06-09 DIAGNOSIS — R22.31 MASS OF FINGER OF RIGHT HAND: Primary | ICD-10-CM

## 2020-07-14 ENCOUNTER — LAB VISIT (OUTPATIENT)
Dept: PRIMARY CARE CLINIC | Facility: OTHER | Age: 60
End: 2020-07-14
Attending: INTERNAL MEDICINE
Payer: OTHER GOVERNMENT

## 2020-07-14 DIAGNOSIS — Z03.818 ENCOUNTER FOR OBSERVATION FOR SUSPECTED EXPOSURE TO OTHER BIOLOGICAL AGENTS RULED OUT: ICD-10-CM

## 2020-07-14 PROCEDURE — U0003 INFECTIOUS AGENT DETECTION BY NUCLEIC ACID (DNA OR RNA); SEVERE ACUTE RESPIRATORY SYNDROME CORONAVIRUS 2 (SARS-COV-2) (CORONAVIRUS DISEASE [COVID-19]), AMPLIFIED PROBE TECHNIQUE, MAKING USE OF HIGH THROUGHPUT TECHNOLOGIES AS DESCRIBED BY CMS-2020-01-R: HCPCS

## 2020-07-18 LAB — SARS-COV-2 RNA RESP QL NAA+PROBE: NOT DETECTED

## 2020-08-19 PROCEDURE — 99285 EMERGENCY DEPT VISIT HI MDM: CPT | Mod: 25

## 2020-08-19 PROCEDURE — 96374 THER/PROPH/DIAG INJ IV PUSH: CPT

## 2020-08-20 ENCOUNTER — CLINICAL SUPPORT (OUTPATIENT)
Dept: CARDIOLOGY | Facility: HOSPITAL | Age: 60
End: 2020-08-20
Attending: INTERNAL MEDICINE
Payer: OTHER GOVERNMENT

## 2020-08-20 ENCOUNTER — HOSPITAL ENCOUNTER (OUTPATIENT)
Facility: HOSPITAL | Age: 60
Discharge: HOME | End: 2020-08-21
Attending: EMERGENCY MEDICINE | Admitting: INTERNAL MEDICINE
Payer: OTHER GOVERNMENT

## 2020-08-20 DIAGNOSIS — G45.9 TIA (TRANSIENT ISCHEMIC ATTACK): ICD-10-CM

## 2020-08-20 DIAGNOSIS — R07.9 CHEST PAIN: ICD-10-CM

## 2020-08-20 DIAGNOSIS — R20.0 LEFT ARM NUMBNESS: ICD-10-CM

## 2020-08-20 DIAGNOSIS — M79.602 LEFT ARM PAIN: Primary | ICD-10-CM

## 2020-08-20 PROBLEM — R29.898 LEFT ARM WEAKNESS: Status: ACTIVE | Noted: 2020-08-20

## 2020-08-20 LAB
ALBUMIN SERPL BCP-MCNC: 3.9 G/DL (ref 3.5–5.2)
ALP SERPL-CCNC: 47 U/L (ref 55–135)
ALT SERPL W/O P-5'-P-CCNC: 21 U/L (ref 10–44)
ANION GAP SERPL CALC-SCNC: 9 MMOL/L (ref 8–16)
AST SERPL-CCNC: 18 U/L (ref 10–40)
BASOPHILS # BLD AUTO: 0.04 K/UL (ref 0–0.2)
BASOPHILS NFR BLD: 0.7 % (ref 0–1.9)
BILIRUB SERPL-MCNC: 0.5 MG/DL (ref 0.1–1)
BUN SERPL-MCNC: 13 MG/DL (ref 6–20)
CALCIUM SERPL-MCNC: 8.4 MG/DL (ref 8.7–10.5)
CHLORIDE SERPL-SCNC: 105 MMOL/L (ref 95–110)
CHOLEST SERPL-MCNC: 159 MG/DL (ref 120–199)
CHOLEST/HDLC SERPL: 3.4 {RATIO} (ref 2–5)
CO2 SERPL-SCNC: 26 MMOL/L (ref 23–29)
CREAT SERPL-MCNC: 0.7 MG/DL (ref 0.5–1.4)
DIFFERENTIAL METHOD: ABNORMAL
EOSINOPHIL # BLD AUTO: 0.1 K/UL (ref 0–0.5)
EOSINOPHIL NFR BLD: 2.2 % (ref 0–8)
ERYTHROCYTE [DISTWIDTH] IN BLOOD BY AUTOMATED COUNT: 15.8 % (ref 11.5–14.5)
EST. GFR  (AFRICAN AMERICAN): >60 ML/MIN/1.73 M^2
EST. GFR  (NON AFRICAN AMERICAN): >60 ML/MIN/1.73 M^2
GLUCOSE SERPL-MCNC: 100 MG/DL (ref 70–110)
HCT VFR BLD AUTO: 40.2 % (ref 37–48.5)
HDLC SERPL-MCNC: 47 MG/DL (ref 40–75)
HDLC SERPL: 29.6 % (ref 20–50)
HGB BLD-MCNC: 12 G/DL (ref 12–16)
IMM GRANULOCYTES # BLD AUTO: 0.01 K/UL (ref 0–0.04)
IMM GRANULOCYTES NFR BLD AUTO: 0.2 % (ref 0–0.5)
LDLC SERPL CALC-MCNC: 69.4 MG/DL (ref 63–159)
LYMPHOCYTES # BLD AUTO: 2 K/UL (ref 1–4.8)
LYMPHOCYTES NFR BLD: 37.4 % (ref 18–48)
MAGNESIUM SERPL-MCNC: 2.1 MG/DL (ref 1.6–2.6)
MCH RBC QN AUTO: 20.6 PG (ref 27–31)
MCHC RBC AUTO-ENTMCNC: 29.9 G/DL (ref 32–36)
MCV RBC AUTO: 69 FL (ref 82–98)
MONOCYTES # BLD AUTO: 0.4 K/UL (ref 0.3–1)
MONOCYTES NFR BLD: 6.9 % (ref 4–15)
NEUTROPHILS # BLD AUTO: 2.8 K/UL (ref 1.8–7.7)
NEUTROPHILS NFR BLD: 52.6 % (ref 38–73)
NONHDLC SERPL-MCNC: 112 MG/DL
NRBC BLD-RTO: 0 /100 WBC
PLATELET # BLD AUTO: 257 K/UL (ref 150–350)
PMV BLD AUTO: 10.8 FL (ref 9.2–12.9)
POTASSIUM SERPL-SCNC: 3.5 MMOL/L (ref 3.5–5.1)
PROT SERPL-MCNC: 6.9 G/DL (ref 6–8.4)
RBC # BLD AUTO: 5.83 M/UL (ref 4–5.4)
SARS-COV-2 RDRP RESP QL NAA+PROBE: NEGATIVE
SODIUM SERPL-SCNC: 140 MMOL/L (ref 136–145)
TRIGL SERPL-MCNC: 213 MG/DL (ref 30–150)
TROPONIN I SERPL DL<=0.01 NG/ML-MCNC: <0.03 NG/ML
WBC # BLD AUTO: 5.35 K/UL (ref 3.9–12.7)

## 2020-08-20 PROCEDURE — 93005 ELECTROCARDIOGRAM TRACING: CPT | Performed by: INTERNAL MEDICINE

## 2020-08-20 PROCEDURE — 96372 THER/PROPH/DIAG INJ SC/IM: CPT | Mod: 59

## 2020-08-20 PROCEDURE — 93306 TTE W/DOPPLER COMPLETE: CPT

## 2020-08-20 PROCEDURE — 63600175 PHARM REV CODE 636 W HCPCS: Performed by: INTERNAL MEDICINE

## 2020-08-20 PROCEDURE — 63600175 PHARM REV CODE 636 W HCPCS: Performed by: STUDENT IN AN ORGANIZED HEALTH CARE EDUCATION/TRAINING PROGRAM

## 2020-08-20 PROCEDURE — 83735 ASSAY OF MAGNESIUM: CPT

## 2020-08-20 PROCEDURE — G0378 HOSPITAL OBSERVATION PER HR: HCPCS

## 2020-08-20 PROCEDURE — 80053 COMPREHEN METABOLIC PANEL: CPT

## 2020-08-20 PROCEDURE — 80061 LIPID PANEL: CPT

## 2020-08-20 PROCEDURE — 85025 COMPLETE CBC W/AUTO DIFF WBC: CPT

## 2020-08-20 PROCEDURE — 96376 TX/PRO/DX INJ SAME DRUG ADON: CPT | Mod: 59

## 2020-08-20 PROCEDURE — 25000003 PHARM REV CODE 250: Performed by: STUDENT IN AN ORGANIZED HEALTH CARE EDUCATION/TRAINING PROGRAM

## 2020-08-20 PROCEDURE — 25000003 PHARM REV CODE 250: Performed by: INTERNAL MEDICINE

## 2020-08-20 PROCEDURE — 36415 COLL VENOUS BLD VENIPUNCTURE: CPT

## 2020-08-20 PROCEDURE — 84484 ASSAY OF TROPONIN QUANT: CPT

## 2020-08-20 PROCEDURE — U0002 COVID-19 LAB TEST NON-CDC: HCPCS

## 2020-08-20 RX ORDER — ONDANSETRON 4 MG/1
8 TABLET, ORALLY DISINTEGRATING ORAL EVERY 8 HOURS PRN
Status: DISCONTINUED | OUTPATIENT
Start: 2020-08-20 | End: 2020-08-21 | Stop reason: HOSPADM

## 2020-08-20 RX ORDER — NAPROXEN SODIUM 220 MG/1
162 TABLET, FILM COATED ORAL
Status: COMPLETED | OUTPATIENT
Start: 2020-08-20 | End: 2020-08-20

## 2020-08-20 RX ORDER — TALC
9 POWDER (GRAM) TOPICAL NIGHTLY PRN
Status: DISCONTINUED | OUTPATIENT
Start: 2020-08-20 | End: 2020-08-21 | Stop reason: HOSPADM

## 2020-08-20 RX ORDER — METOPROLOL TARTRATE 25 MG/1
25 TABLET, FILM COATED ORAL 2 TIMES DAILY
Status: DISCONTINUED | OUTPATIENT
Start: 2020-08-20 | End: 2020-08-21 | Stop reason: HOSPADM

## 2020-08-20 RX ORDER — ENOXAPARIN SODIUM 100 MG/ML
40 INJECTION SUBCUTANEOUS EVERY 24 HOURS
Status: DISCONTINUED | OUTPATIENT
Start: 2020-08-20 | End: 2020-08-21 | Stop reason: HOSPADM

## 2020-08-20 RX ORDER — KETOROLAC TROMETHAMINE 30 MG/ML
15 INJECTION, SOLUTION INTRAMUSCULAR; INTRAVENOUS
Status: COMPLETED | OUTPATIENT
Start: 2020-08-20 | End: 2020-08-20

## 2020-08-20 RX ORDER — LISINOPRIL 5 MG/1
10 TABLET ORAL DAILY
Status: DISCONTINUED | OUTPATIENT
Start: 2020-08-20 | End: 2020-08-21 | Stop reason: HOSPADM

## 2020-08-20 RX ORDER — GLUCAGON 1 MG
1 KIT INJECTION
Status: DISCONTINUED | OUTPATIENT
Start: 2020-08-20 | End: 2020-08-21 | Stop reason: HOSPADM

## 2020-08-20 RX ORDER — SODIUM CHLORIDE 0.9 % (FLUSH) 0.9 %
3 SYRINGE (ML) INJECTION EVERY 6 HOURS PRN
Status: DISCONTINUED | OUTPATIENT
Start: 2020-08-20 | End: 2020-08-21 | Stop reason: HOSPADM

## 2020-08-20 RX ORDER — ATORVASTATIN CALCIUM 40 MG/1
40 TABLET, FILM COATED ORAL DAILY
Status: DISCONTINUED | OUTPATIENT
Start: 2020-08-20 | End: 2020-08-21 | Stop reason: HOSPADM

## 2020-08-20 RX ORDER — HYDROCODONE BITARTRATE AND ACETAMINOPHEN 5; 325 MG/1; MG/1
1 TABLET ORAL EVERY 6 HOURS PRN
Status: DISCONTINUED | OUTPATIENT
Start: 2020-08-20 | End: 2020-08-21 | Stop reason: HOSPADM

## 2020-08-20 RX ORDER — KETOROLAC TROMETHAMINE 30 MG/ML
15 INJECTION, SOLUTION INTRAMUSCULAR; INTRAVENOUS ONCE
Status: COMPLETED | OUTPATIENT
Start: 2020-08-20 | End: 2020-08-20

## 2020-08-20 RX ORDER — ASPIRIN 81 MG/1
81 TABLET ORAL DAILY
Status: DISCONTINUED | OUTPATIENT
Start: 2020-08-20 | End: 2020-08-21 | Stop reason: HOSPADM

## 2020-08-20 RX ORDER — IBUPROFEN 200 MG
16 TABLET ORAL
Status: DISCONTINUED | OUTPATIENT
Start: 2020-08-20 | End: 2020-08-21 | Stop reason: HOSPADM

## 2020-08-20 RX ORDER — ACETAMINOPHEN 325 MG/1
650 TABLET ORAL EVERY 4 HOURS PRN
Status: DISCONTINUED | OUTPATIENT
Start: 2020-08-20 | End: 2020-08-21 | Stop reason: HOSPADM

## 2020-08-20 RX ORDER — PANTOPRAZOLE SODIUM 40 MG/1
40 TABLET, DELAYED RELEASE ORAL DAILY
Status: DISCONTINUED | OUTPATIENT
Start: 2020-08-20 | End: 2020-08-21 | Stop reason: HOSPADM

## 2020-08-20 RX ORDER — IBUPROFEN 200 MG
24 TABLET ORAL
Status: DISCONTINUED | OUTPATIENT
Start: 2020-08-20 | End: 2020-08-21 | Stop reason: HOSPADM

## 2020-08-20 RX ADMIN — MELATONIN 9 MG: at 11:08

## 2020-08-20 RX ADMIN — KETOROLAC TROMETHAMINE 15 MG: 30 INJECTION, SOLUTION INTRAMUSCULAR at 02:08

## 2020-08-20 RX ADMIN — ASPIRIN 81 MG 162 MG: 81 TABLET ORAL at 02:08

## 2020-08-20 RX ADMIN — KETOROLAC TROMETHAMINE 15 MG: 30 INJECTION, SOLUTION INTRAMUSCULAR at 12:08

## 2020-08-20 RX ADMIN — ENOXAPARIN SODIUM 40 MG: 100 INJECTION SUBCUTANEOUS at 04:08

## 2020-08-20 RX ADMIN — ASPIRIN 81 MG: 81 TABLET, DELAYED RELEASE ORAL at 08:08

## 2020-08-20 RX ADMIN — PANTOPRAZOLE SODIUM 40 MG: 40 TABLET, DELAYED RELEASE ORAL at 08:08

## 2020-08-20 RX ADMIN — HYDROCODONE BITARTRATE AND ACETAMINOPHEN 1 TABLET: 5; 325 TABLET ORAL at 11:08

## 2020-08-20 NOTE — PLAN OF CARE
59-year-old female with past medical history of CAD, hypertension, hyperlipidemia admitted for TIA/stroke workup after presenting to ED complaining of left arm pain and weakness.  Patient admitted earlier this morning.    Chart including H&P from earlier today by Dr. Pugh reviewed.  MRI brain negative for acute CVA, for extremity ultrasound negative for the PE.  Patient examined reports pain and weakness have resolved.  She states this is been a recurrent issue for the past 2-3 years.     On exam patient has full range of motion to hand wrist elbow shoulder.  Sensation intact.  No pain with palpation or movement.      Follow-up neuro recs.  Likely discharge later today.    Prema Barrow D.O.  08/20/2020

## 2020-08-20 NOTE — PLAN OF CARE
Problem: Adult Inpatient Plan of Care  Goal: Plan of Care Review  8/20/2020 1630 by Lauren Bond RN  Outcome: Ongoing, Progressing  8/20/2020 1622 by Lauren Bond RN  Outcome: Ongoing, Progressing  Goal: Patient-Specific Goal (Individualization)  8/20/2020 1630 by Lauren Bond RN  Outcome: Ongoing, Progressing  8/20/2020 1622 by Lauren Bond RN  Outcome: Ongoing, Progressing  Goal: Absence of Hospital-Acquired Illness or Injury  8/20/2020 1630 by Lauren Bond RN  Outcome: Ongoing, Progressing  8/20/2020 1622 by Lauren Bond RN  Outcome: Ongoing, Progressing  Goal: Optimal Comfort and Wellbeing  8/20/2020 1630 by Lauren Bond RN  Outcome: Ongoing, Progressing  8/20/2020 1622 by Lauren Bond RN  Outcome: Ongoing, Progressing  Goal: Readiness for Transition of Care  8/20/2020 1630 by Lauren Bond RN  Outcome: Ongoing, Progressing  8/20/2020 1622 by Lauren Bond RN  Outcome: Ongoing, Progressing  Goal: Rounds/Family Conference  8/20/2020 1630 by Lauren Bond RN  Outcome: Ongoing, Progressing  8/20/2020 1622 by Lauren Bond RN  Outcome: Ongoing, Progressing     Problem: Fall Injury Risk  Goal: Absence of Fall and Fall-Related Injury  8/20/2020 1630 by Lauren Bond RN  Outcome: Ongoing, Progressing  8/20/2020 1622 by Lauren Bond RN  Outcome: Ongoing, Progressing     Problem: Cerebral Tissue Perfusion Risk (Stroke, Ischemic/Transient Ischemic Attack)  Goal: Optimal Cerebral Tissue Perfusion  Outcome: Ongoing, Progressing     Problem: Pain (Stroke, Ischemic/Transient Ischemic Attack)  Goal: Acceptable Pain Control  Outcome: Ongoing, Progressing       Rule out TIA and CVA

## 2020-08-20 NOTE — ED PROVIDER NOTES
"Encounter Date: 8/19/2020       History     Chief Complaint   Patient presents with    Arm Pain     Left arm x2 days    Back Pain     Left upper back since tonight     HPI   59 y.o female with hx of HTN, CAD, CABG, presents with left arm pain x 2 days associated with weakness. Pt reports "my left arm feels heavy." She states last time she had this and saw a doctor she was told she was having a stroke. Describes pain from left shoulder blade shooting down her  left finger tips with some numbness. Denies trauma or injury to arm. Denies chest pain, sob, headache.    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    cyst removed from right breast      LASER LAPAROSCOPY      TUBAL LIGATION       Family History   Problem Relation Age of Onset    Cancer Maternal Grandmother     Breast cancer Sister     Asthma Sister     Arrhythmia Mother     Diabetes Mellitus Neg Hx      Social History     Tobacco Use    Smoking status: Current Every Day Smoker   Substance Use Topics    Alcohol use: Yes     Alcohol/week: 1.0 standard drinks     Types: 1 Glasses of wine per week    Drug use: No     Review of Systems   Constitutional: Negative for fever.   HENT: Negative for sore throat.    Respiratory: Negative for shortness of breath.    Cardiovascular: Negative for chest pain.   Gastrointestinal: Negative for nausea.   Genitourinary: Negative for dysuria.   Musculoskeletal:        Left arm pain   Skin: Negative for rash.   Neurological: Positive for weakness and numbness. Negative for headaches. "   Hematological: Does not bruise/bleed easily.       Physical Exam     Initial Vitals [08/19/20 2359]   BP Pulse Resp Temp SpO2   (!) 160/77 67 16 97.7 °F (36.5 °C) 98 %      MAP       --         Physical Exam    Nursing note and vitals reviewed.  Constitutional: She appears well-developed and well-nourished. She is not diaphoretic.   HENT:   Head: Normocephalic and atraumatic.   Eyes: Conjunctivae and EOM are normal.   Neck: Normal range of motion. Neck supple.   Cardiovascular: Normal rate, regular rhythm and intact distal pulses.   Pulmonary/Chest: No respiratory distress.   Abdominal: Soft. She exhibits no distension.   Musculoskeletal: Normal range of motion.   Neurological: She is alert and oriented to person, place, and time.   No facial droop or asymmetry. No pronator drift. Equal strength bilateral upper extremities. Decreased sensation to light touch to dorsum of left hand.   Skin: Skin is warm and dry.   Psychiatric: She has a normal mood and affect.         ED Course   Procedures  Labs Reviewed   TROPONIN I   CBC W/ AUTO DIFFERENTIAL   COMPREHENSIVE METABOLIC PANEL     EKG Readings: (Independently Interpreted)   Initial Reading: No STEMI. Previous EKG: Compared with most recent EKG Rhythm: Sinus Bradycardia. Heart Rate: 55. Conduction: 1st Degree AV Block.       Imaging Results    None          Medical Decision Making:   Initial Assessment:   59 y.o female with hx of CAD, CABG, Stroke who presents with left arm pain associated with numbness and weakness x 2 days. On presentation pt is HDS, VSS. On exam there is no motor deficits; there is sensory deficit to left hand. Concern for ACS, TIA, stroke, radiculopathy vs MSK strain. Will initiate cardiac workup, head CT, given ASA, and reassess. Anticipate admission.                    ED Course as of Aug 20 0332   Thu Aug 20, 2020   0331 CTH negative. Workup including troponin, cxr, screening labs grossly unremarkable. Pt will benefit from further stroke/TIA  workup and neurology eval. Have contacted hospitalist for admission.     [MS]      ED Course User Index  [MS] Melvi Mireles MD                Clinical Impression:       ICD-10-CM ICD-9-CM   1. Left arm pain  M79.602 729.5   2. Left arm numbness  R20.0 782.0                                Melvi Mireles MD  Resident  08/20/20 0337

## 2020-08-20 NOTE — H&P
[facility] Medicine History & Physical Examination   Patient Name: Marie Salcido  MRN: [id number]  Patient Class: OP- Observation   Admission Date: 8/20/2020 12:28 AM  Length of Stay: 0  Attending Physician: Rodri Pugh MD  Primary Care Provider: Yannick Bell MD  Face-to-Face encounter date: 08/20/2020  Code Status: full code  Chief Complaint: Arm Pain (Left arm x2 days) and Back Pain (Left upper back since tonight)        Patient information was obtained from patient, past medical records and ER records.   HISTORY OF PRESENT ILLNESS:   Marie Salcido is a 59 y.o. Black or  female who  has a past medical history of Anemia, Coronary artery disease, Hyperlipidemia, Hypertension, Silent myocardial infarction, and Sleep apnea.. The patient presented to [facility] on 8/20/2020 with a primary complaint of Arm Pain (Left arm x2 days) and Back Pain (Left upper back since tonight)    History was obtained from the patient, and ER physician Sign-out. Patient reports left arm weakness associated with back pain x 2 days. She describes the pain as episodic and its been going for last 2 years however recently it got worst. She said last time when she came here for the same complaints she was diagnosed with a stroke and thinks that she might be getting another stroke. She takes aspirin at home. She denies lifting heavy weight.     In the emergency room, patient CBC was unremarkable. CMP was unremarkable. CT scan head negative    Decision to admit was taken and patient was informed about the plan of care.   REVIEW OF SYSTEMS:   10 Point Review of System was performed and was found to be negative except for that mentioned already in the HPI above.     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 [facility]    cyst removed from right breast      LASER LAPAROSCOPY      TUBAL LIGATION         ALLERGIES:   Codeine    FAMILY HISTORY:     Family History   Problem Relation Age of Onset    Cancer Maternal Grandmother     Breast cancer Sister     Asthma Sister     Arrhythmia Mother     Diabetes Mellitus Neg Hx        SOCIAL HISTORY:     Social History     Tobacco Use    Smoking status: Current Every Day Smoker   Substance Use Topics    Alcohol use: Yes     Alcohol/week: 1.0 standard drinks     Types: 1 Glasses of wine per week        Social History     Substance and Sexual Activity   Sexual Activity Yes    Partners: Male        HOME MEDICATIONS:     Prior to Admission medications    Medication Sig Start Date End Date Taking? Authorizing Provider   aspirin (ECOTRIN) 81 MG EC tablet Take 81 mg by mouth once daily.    Historical Provider, MD   atorvastatin (LIPITOR) 40 MG tablet Take 1 tablet (40 mg total) by mouth once daily. 6/3/19 7/3/19  JAMAR Sam   cetirizine (ZYRTEC) 10 MG tablet Take 1 tablet (10 mg total) by mouth once daily. 9/12/19 9/11/20  JAMAR Limon   famotidine (PEPCID) 20 MG tablet Take 1 tablet (20 mg total) by mouth 2 (two) times daily. 6/3/19 7/3/19  JAMAR Sam   fish oil-omega-3 fatty acids 300-1,000 mg capsule Take by mouth once daily.    Historical Provider, MD   fluticasone propionate (FLONASE) 50 mcg/actuation nasal spray 2 sprays (100 mcg total) by Each Nostril route 2 (two) times daily.  Patient not taking: Reported on 10/1/2019 9/12/19   JAMAR Limon   lisinopril 10 MG tablet Take 1 tablet (10 mg total) by mouth once daily. 6/3/19 7/3/19  JAMAR Sam   methylPREDNISolone (MEDROL DOSEPACK) 4 mg tablet use as directed  "10/1/19   Petar Jackson MD   metoprolol tartrate (LOPRESSOR) 25 MG tablet Take 25 mg by mouth 2 (two) times daily.    Historical Provider, MD   pantoprazole (PROTONIX) 40 MG tablet Take 1 tablet (40 mg total) by mouth once daily. 6/3/19 7/3/19  Chikis Baez, [facility]         PHYSICAL EXAM:   BP (!) 160/77   Pulse 67   Temp 97.7 °F (36.5 °C) (Temporal)   Resp 16   Ht 5' 7" (1.702 m)   Wt 97.1 kg (214 lb)   LMP 10/04/2013   SpO2 98%   BMI 33.52 kg/m²   Vitals Reviewed  General appearance: Well-developed, well-nourished,  Black or  female in no apparent distress.  Skin: No Rash.   Neuro: Motor and sensory exams grossly intact. Good tone. Power in all 4 extremities 5/5.   HENT: Atraumatic head. Moist mucous membranes of oral cavity.  Eyes: Normal extraocular movements.   Neck: Supple. No evidence of lymphadenopathy. No thyroidomegaly.  Lungs: Clear to auscultation bilaterally. No wheezing present.   Heart: Regular rate and rhythm. S1 and S2 present with no murmurs/gallop/rub. No pedal edema. No JVD present.   Abdomen: Soft, non-distended, non-tender. No rebound tenderness/guarding. No masses or organomegaly. Bowel sounds are normal. Bladder is not palpable.   Extremities: No cyanosis, clubbing.  Psych/mental status: Alert and oriented. Cooperative. Responds appropriately to questions.   EMERGENCY DEPARTMENT LABS AND IMAGING:     Labs Reviewed   CBC W/ AUTO DIFFERENTIAL - Abnormal; Notable for the following components:       Result Value    RBC 5.83 (*)     Mean Corpuscular Volume 69 (*)     Mean Corpuscular Hemoglobin 20.6 (*)     Mean Corpuscular Hemoglobin Conc 29.9 (*)     RDW 15.8 (*)     All other components within normal limits   COMPREHENSIVE METABOLIC PANEL - Abnormal; Notable for the following components:    Calcium 8.4 (*)     Alkaline Phosphatase 47 (*)     All other components within normal limits   TROPONIN I   MAGNESIUM   LIPID PANEL   SARS-COV-2 RNA AMPLIFICATION, QUAL "       CT Head Without Contrast   Final Result      X-Ray Chest PA And Lateral    (Results Pending)   MRI Brain Without Contrast    (Results Pending)   US Upper Extremity Veins Bilateral    (Results Pending)       ASSESSMENT & PLAN:   Marie Salcido is a 59 y.o. female admitted for    1. Left arm weakness/pain  2. H/o stroke  3. Coronary artery disease  4. Hypertension  5. Obesity    Plan  Admit to telemetry  Neurology consultation  Neurochecks  Duplex of upper extremities to rule out DVT  MRI brain (less likely stroke)  If workup negative, she will need MRI of the back along with EMG as outpatient.  Continue home medications    DVT Prophylaxis: will be placed on Heparin/Lovenox for DVT prophylaxis and will be advised to be as mobile as possible and sit in a chair as tolerated.   ________________________________________________________________________________    Discharge Planning and Disposition: No mobility needs. Ambulating well.   Face-to-Face encounter date: 08/20/2020  Encounter included review of the medical records, interviewing and examining the patient face-to-face, discussion with family and other health care providers including emergency medicine physician, admission orders, interpreting lab/test results and formulating a plan of care.   Medical Decision Making during this encounter was  [_] Low Complexity  [_] Moderate Complexity  [x] High Complexity  _________________________________________________________________________________    INPATIENT LIST OF MEDICATIONS     Current Facility-Administered Medications:     acetaminophen tablet 650 mg, 650 mg, Oral, Q4H PRN, Rodri Pugh MD    aspirin EC tablet 81 mg, 81 mg, Oral, Daily, Rodri Pugh MD    atorvastatin tablet 40 mg, 40 mg, Oral, Daily, Rodri Pugh MD    dextrose 50% injection 12.5 g, 12.5 g, Intravenous, PRN, Rodri Pugh MD    dextrose 50% injection 25 g, 25 g, Intravenous, PRN, Rodri Pugh MD     enoxaparin injection 40 mg, 40 mg, Subcutaneous, Q24H, Rodri Pugh MD    glucagon (human recombinant) injection 1 mg, 1 mg, Intramuscular, PRN, Rodri Pugh MD    glucose chewable tablet 16 g, 16 g, Oral, PRN, Rodri Pugh MD    glucose chewable tablet 24 g, 24 g, Oral, PRN, Rodri Pugh MD    HYDROcodone-acetaminophen 5-325 mg per tablet 1 tablet, 1 tablet, Oral, Q6H PRN, Rodri Pugh MD    lisinopriL tablet 10 mg, 10 mg, Oral, Daily, Rodri Pugh MD    melatonin tablet 9 mg, 9 mg, Oral, Nightly PRN, Rodri Pugh MD    metoprolol tartrate (LOPRESSOR) tablet 25 mg, 25 mg, Oral, BID, Rodri Pugh MD    ondansetron disintegrating tablet 8 mg, 8 mg, Oral, Q8H PRN, Rodri Pugh MD    pantoprazole EC tablet 40 mg, 40 mg, Oral, Daily, Rodri Pugh MD    sodium chloride 0.9% flush 3 mL, 3 mL, Intravenous, Q6H PRN, Rodri Pugh MD    Current Outpatient Medications:     aspirin (ECOTRIN) 81 MG EC tablet, Take 81 mg by mouth once daily., Disp: , Rfl:     atorvastatin (LIPITOR) 40 MG tablet, Take 1 tablet (40 mg total) by mouth once daily., Disp: 30 tablet, Rfl: 0    cetirizine (ZYRTEC) 10 MG tablet, Take 1 tablet (10 mg total) by mouth once daily., Disp: 30 tablet, Rfl: 2    famotidine (PEPCID) 20 MG tablet, Take 1 tablet (20 mg total) by mouth 2 (two) times daily., Disp: 60 tablet, Rfl: 0    fish oil-omega-3 fatty acids 300-1,000 mg capsule, Take by mouth once daily., Disp: , Rfl:     fluticasone propionate (FLONASE) 50 mcg/actuation nasal spray, 2 sprays (100 mcg total) by Each Nostril route 2 (two) times daily. (Patient not taking: Reported on 10/1/2019), Disp: 1 Bottle, Rfl: 0    lisinopril 10 MG tablet, Take 1 tablet (10 mg total) by mouth once daily., Disp: 30 tablet, Rfl: 0    methylPREDNISolone (MEDROL DOSEPACK) 4 mg tablet, use as directed, Disp: 1 Package, Rfl: 0    metoprolol tartrate (LOPRESSOR) 25 MG tablet, Take 25  mg by mouth 2 (two) times daily., Disp: , Rfl:     pantoprazole (PROTONIX) 40 MG tablet, Take 1 tablet (40 mg total) by mouth once daily., Disp: 30 tablet, Rfl: 0      Scheduled Meds:   aspirin  81 mg Oral Daily    atorvastatin  40 mg Oral Daily    enoxaparin  40 mg Subcutaneous Q24H    lisinopriL  10 mg Oral Daily    metoprolol tartrate  25 mg Oral BID    pantoprazole  40 mg Oral Daily     Continuous Infusions:  PRN Meds:.acetaminophen, dextrose 50%, dextrose 50%, glucagon (human recombinant), glucose, glucose, HYDROcodone-acetaminophen, melatonin, ondansetron, sodium chloride 0.9%      Rodri Pugh  [facility] Hospitalist  08/20/2020

## 2020-08-20 NOTE — CONSULTS
[facility]  Neurology  Consult Note    Patient Name: Marie Salcido  MRN: [id number]  Admission Date: 8/20/2020  Hospital Length of Stay: 0 days  Code Status: Full Code   Attending Provider: Prema Barrow DO   Consulting Provider: Dr. Rito Cochran MD   Consulting NP: Sharon Baum NP  Primary Care Physician: Yannick Bell MD  Principal Problem:Left arm weakness    Inpatient consult to Neurology  Consult performed by: Sharon Baum NP  Consult ordered by: Rodri Pugh MD        Subjective:     Chief Complaint:    Chief Complaint   Patient presents with    Arm Pain     Left arm x2 days    Back Pain     Left upper back since tonight       HPI:Marie Salcido is a 59 y.o. Black or  female who  has a past medical history of Anemia, Coronary artery disease, Hyperlipidemia, Hypertension, Silent myocardial infarction, and Sleep apnea.. The patient presented to [facility] on 8/20/2020 with a primary complaint of Arm Pain (Left arm x2 days) and Back Pain (Left upper back since tonight)     History was obtained from the patient, and ER physician Sign-out. Patient reports left arm weakness associated with back pain x 2 days. She describes the pain as episodic and its been going for last 2 years however recently it got worst. She said last time when she came here for the same complaints she was diagnosed with a stroke and thinks that she might be getting another stroke. She takes aspirin at home. She denies lifting heavy weight.      In the emergency room, patient CBC was unremarkable. CMP was unremarkable. CT scan head negative     Decision to admit was taken and patient was informed about the plan of care.       Neurology Consult Note: Patient seen and examined with  Dr. Rito Cochran. Discussed plan of care. No family present. Patient is a 59 year old AA female with PMHX: Anemia, Coronary artery disease, HLD, HTN, MI, Sleep apena. Patient complains of neck and back pain  that radiates down her left arm. She also reports left arm numbness, and weakness. The patient has a normal neuro focal exam with the exception of 4/5 weakness of left upper extremity no pronator drift noted. Reflexes normal. Patient is Alert awake oriented x4 NADN, No SOB noted. respiratory regular even unalabored. Stroke work up in progress MRI of brain w/o contrast Negative for acute ischemia or acute intracranial pathology.   Minimal white matter microangiopathic changes. MRA brain negative, CUS No hemodynamically significant internal carotid artery stenosis and TTE w/bubble pending. Suspect Cervical radiculopathy but will Rule out Cord Compression or Entrapment  Will order MRI of C-spine and T-spine w/o contrast. If negative patient may benefit from out patient EMG/NCS  Will continue to follow.            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 Parrino [facility]    cyst removed from right breast      LASER LAPAROSCOPY      TUBAL LIGATION         Review of patient's allergies indicates:   Allergen Reactions    Codeine Rash       Current Neurological Medications:     No current facility-administered medications on file prior to encounter.      Current Outpatient Medications on File Prior to Encounter   Medication Sig    aspirin (ECOTRIN) 81 MG EC tablet Take 81 mg by mouth once daily.    fish oil-omega-3 fatty acids 300-1,000 mg capsule Take by mouth once daily.    atorvastatin (LIPITOR) 40 MG tablet Take 1 tablet (40 mg total) by mouth once daily.    cetirizine (ZYRTEC) 10 MG tablet Take 1 tablet (10 mg total) by mouth once daily.    famotidine (PEPCID) 20 MG tablet Take 1  tablet (20 mg total) by mouth 2 (two) times daily.    fluticasone propionate (FLONASE) 50 mcg/actuation nasal spray 2 sprays (100 mcg total) by Each Nostril route 2 (two) times daily. (Patient not taking: Reported on 10/1/2019)    lisinopril 10 MG tablet Take 1 tablet (10 mg total) by mouth once daily.    methylPREDNISolone (MEDROL DOSEPACK) 4 mg tablet use as directed    pantoprazole (PROTONIX) 40 MG tablet Take 1 tablet (40 mg total) by mouth once daily.    [DISCONTINUED] metoprolol tartrate (LOPRESSOR) 25 MG tablet Take 25 mg by mouth 2 (two) times daily.      Family History     Problem Relation (Age of Onset)    Arrhythmia Mother    Asthma Sister    Breast cancer Sister    Cancer Maternal Grandmother        Tobacco Use    Smoking status: Current Every Day Smoker   Substance and Sexual Activity    Alcohol use: Yes     Alcohol/week: 1.0 standard drinks     Types: 1 Glasses of wine per week    Drug use: No    Sexual activity: Yes     Partners: Male     Review of Systems   Constitutional: Negative.    HENT: Negative.    Eyes: Negative.    Respiratory: Negative.    Cardiovascular: Negative.    Gastrointestinal: Negative.    Endocrine: Negative.    Genitourinary: Negative.    Musculoskeletal: Positive for back pain, myalgias and neck pain.   Skin: Negative.    Allergic/Immunologic: Negative.    Neurological: Positive for weakness and numbness.   Hematological: Negative.    Psychiatric/Behavioral: Negative.      Objective:     Vital Signs (Most Recent):  Temp: 97.8 °F (36.6 °C) (08/20/20 0751)  Pulse: 66 (08/20/20 0900)  Resp: 13 (08/20/20 0900)  BP: 136/89 (08/20/20 0751)  SpO2: 99 % (08/20/20 0900) Vital Signs (24h Range):  Temp:  [97.7 °F (36.5 °C)-98.3 °F (36.8 °C)] 97.8 °F (36.6 °C)  Pulse:  [62-68] 66  Resp:  [12-19] 13  SpO2:  [97 %-100 %] 99 %  BP: (114-163)/(63-90) 136/89     Weight: 97 kg (213 lb 13.5 oz)  Body mass index is 33.49 kg/m².    Physical Exam  Constitutional:       General: She is awake.       Appearance: Normal appearance. She is well-developed.   HENT:      Head: Normocephalic and atraumatic.      Nose: Nose normal.      Mouth/Throat:      Mouth: Mucous membranes are moist.      Pharynx: Oropharynx is clear.   Eyes:      General: No visual field deficit.     Extraocular Movements: Extraocular movements intact and EOM normal.      Pupils: Pupils are equal, round, and reactive to light.   Neck:      Musculoskeletal: Normal range of motion.   Cardiovascular:      Rate and Rhythm: Normal rate.      Pulses: Normal pulses.   Pulmonary:      Effort: Pulmonary effort is normal.      Breath sounds: Normal breath sounds.   Abdominal:      General: Bowel sounds are normal.      Palpations: Abdomen is soft.   Skin:     General: Skin is warm and dry.      Capillary Refill: Capillary refill takes less than 2 seconds.   Neurological:      General: No focal deficit present.      Mental Status: She is alert and oriented to person, place, and time.      GCS: GCS eye subscore is 4 - spontaneous. GCS verbal subscore is 5 - oriented. GCS motor subscore is 6 - obeys commands.      Cranial Nerves: No cranial nerve deficit, dysarthria or facial asymmetry.      Sensory: No sensory deficit.      Motor: Weakness present. No tremor, atrophy, abnormal muscle tone, seizure activity or pronator drift.      Coordination: Coordination is intact. Coordination normal. Finger-Nose-Finger Test normal.      Gait: Gait is intact. Gait normal.      Deep Tendon Reflexes: Reflexes normal.      Reflex Scores:       Tricep reflexes are 2+ on the right side and 2+ on the left side.       Bicep reflexes are 2+ on the right side and 2+ on the left side.       Brachioradialis reflexes are 2+ on the right side and 2+ on the left side.       Patellar reflexes are 2+ on the right side and 2+ on the left side.       Achilles reflexes are 2+ on the right side and 2+ on the left side.  Psychiatric:         Attention and Perception: Attention normal.          Mood and Affect: Mood normal.         Speech: Speech normal.         Behavior: Behavior normal. Behavior is cooperative.         Thought Content: Thought content normal.         Cognition and Memory: Cognition and memory normal.         Judgment: Judgment normal.         NEUROLOGICAL EXAMINATION:     MENTAL STATUS   Oriented to person, place, and time.   Registration: recalls 3 of 3 objects. Recall at 5 minutes: recalls 3 of 3 objects. Follows 3 step commands.   Attention: normal. Concentration: normal.   Speech: speech is normal   Level of consciousness: alert  Knowledge: good and consistent with education.   Able to name object. Able to read. Able to repeat. Able to write.     CRANIAL NERVES     CN II   Visual fields full to confrontation.     CN III, IV, VI   Pupils are equal, round, and reactive to light.  Extraocular motions are normal.   Upgaze: normal  Downgaze: normal    CN V   Facial sensation intact.     CN VIII   CN VIII normal.     CN XI   CN XI normal.     CN XII   CN XII normal.     MOTOR EXAM   Muscle bulk: normal  Overall muscle tone: normal  Right arm tone: normal  Left arm tone: normal  Right leg tone: normal    Strength   Strength 5/5 except as noted.   Left neck flexion: 4/5  Left neck extension: 4/5  Left deltoid: 4/5  Left biceps: 4/5  Left triceps: 4/5  Left wrist flexion: 4/5  Left wrist extension: 4/5  Left interossei: 4/5    REFLEXES     Reflexes   Right brachioradialis: 2+  Left brachioradialis: 2+  Right biceps: 2+  Left biceps: 2+  Right triceps: 2+  Left triceps: 2+  Right patellar: 2+  Left patellar: 2+  Right achilles: 2+  Left achilles: 2+  Right plantar: normal  Left plantar: normal    SENSORY EXAM   Light touch normal.   Proprioception normal.     GAIT AND COORDINATION     Gait  Gait: normal     Coordination   Finger to nose coordination: normal    NIH Stroke Scale:    Level of Consciousness: 0 - alert  LOC Questions: 0 - answers both correctly  LOC Commands: 0 - performs  both correctly  Best Gaze: 0 - normal  Visual: 0 - no visual loss  Facial Palsy: 0 - normal  Motor Left Arm: 0 - no drift  Motor Right Arm: 0 - no drift  Motor Left Le - no drift  Motor Right Le - no drift  Limb Ataxia: 0 - absent  Sensory: 0 - normal  Best Language: 0 - no aphasia  Dysarthria: 0 - normal articulation  Extinction and Inattention: 0 - no neglect  NIH Stroke Scale Total: 0  Monalisa Coma Scale:  Best Eye Response: 4 - spontaneous  Best Motor Response: 6 - obeys commands  Best Verbal Response: 5 - oriented  Monalisa Coma Scale Total: 15    Significant Labs:   BMP  Lab Results   Component Value Date     2020    K 3.5 2020     2020    CO2 26 2020    BUN 13 2020    CREATININE 0.7 2020    CALCIUM 8.4 (L) 2020    ANIONGAP 9 2020    ESTGFRAFRICA >60.0 2020    EGFRNONAA >60.0 2020     Lab Results   Component Value Date    LDLCALC 69.4 2020     Lab Results   Component Value Date    WBC 5.35 2020    HGB 12.0 2020    HCT 40.2 2020    MCV 69 (L) 2020     2020           Significant Imaging:   Narrative & Impression     MRA BRAIN WITHOUT CONTRAST     CLINICAL HISTORY:  59 years Female Numbness or tingling, paresthesia (Ped 0-18y)     COMPARISON: None     FINDINGS: 3D TOF MRA without contrast was performed, with maximum  intensity projections reviewed.     The internal carotid and vertebrobasilar systems are patent, without  evidence of aneurysm, vascular occlusion, or significant stenosis. The  anterior, middle and posterior cerebral arteries demonstrate no  aneurysm, stenosis or vascular malformation.     IMPRESSION: Negative MRA head.     Electronically Signed by Dorian ARNDT on 2020 11:48 AM   MRI BRAIN WITHOUT CONTRAST     CLINICAL HISTORY:  59 years Female Headache, chronic, with new features     COMPARISON: Noncontrast CT head 2020     FINDINGS: Diffusion-weighted  imaging is negative for acute ischemia or  focal lesion. Gradient echo images show no evidence of intracranial  hemorrhage.     No intracranial mass, midline shift, or mass effect.  Ventricles and sulci are normal in size. Minimal periventricular and  deep white matter T2/FLAIR hyperintensities, nonspecific, most likely  secondary to microangiopathic change. Cerebellar hemispheres and  brainstem are unremarkable. Major intracranial T2 flow-voids are  patent.     Mastoid air cells are clear. Paranasal sinuses are clear. Orbits are  unremarkable.     No bone marrow signal abnormality. Pituitary gland is unremarkable.     IMPRESSION:     Negative for acute ischemia or acute intracranial pathology.     Minimal white matter microangiopathic changes.     Impression     Reason: Left arm weakness     Grayscale, color and spectral Doppler analysis was performed. Criteria  for stenosis is based upon the Society of Radiologists in Ultrasound  Consensus Conference, 2003 (Radiology, November 2003, 229, 340-346).     Comparison: None     Findings:  Grayscale images show there is mild intimal wall thickening and  scattered plaque     Estimated peak systolic velocity in right internal carotid artery is  53 cm/s. Antegrade flow is present in the right vertebral artery.     Estimated peak systolic velocity in left internal carotid artery is 71  cm/s. Antegrade flow is present in the left vertebral artery.     Impression:  No hemodynamically significant internal carotid artery stenosis.     Impression     Reason: Left arm weakness     Grayscale, color and spectral Doppler analysis was performed. Criteria  for stenosis is based upon the Society of Radiologists in Ultrasound  Consensus Conference, 2003 (Radiology, November 2003, 229, 340-346).     Comparison: None     Findings:  Grayscale images show there is mild intimal wall thickening and  scattered plaque     Estimated peak systolic velocity in right internal carotid artery is  53  cm/s. Antegrade flow is present in the right vertebral artery.     Estimated peak systolic velocity in left internal carotid artery is 71  cm/s. Antegrade flow is present in the left vertebral artery.     Impression:  No hemodynamically significant internal carotid artery stenosis.             Lab Results   Component Value Date    LDLCALC 69.4 08/20/2020       Assessment and Plan:  59 year old AA female with impression:  1. Ruled Acute Stroke  2. Numbness and Tingling  3. Chronic Neck and Back Pain  4. Left arm weakness  5. Suspect Cervical radiculopathy/ Rule out Cord Compression or Entrapment    -MRI of brain w/o contrast Negative for acute ischemia or acute intracranial pathology. Minimal white matter microangiopathic changes.   -MRA brain negative  - TTE w/bubble pending    -CUS No hemodynamically significant internal carotid artery stenosis.   LDL-69.4 below goal  -TSH vitamin B1, B6, and Vitamin B12 ordered  Hemoglobin A 1c ordered   -MRI of C-spine and T-spine w/o contrast ordered  If negative patient may benefit from EMG/NCS outpatient for further workup.     8/20: Stroke work up in progress negative thus far: MRI of brain w/o contrast Negative for acute ischemia or acute intracranial pathology. Minimal white matter microangiopathic changes. MRA brain negative, CUS No hemodynamically significant internal carotid artery stenosis and TTE w/bubble pending. I Suspect Cervical radiculopathy but will Rule out Cord Compression or Entrapment will order Vitamin B12 level, & TSH,  MRI of C-spine and T-spine w/o contrast. If negative patient may benefit from EMG/NCS outpatient for further workup.    Will continue to follow.     [phone] within 3 days from discharge.    Stroke education was provided including stroke risk factors modification and any acute neurological changes including weakness, confusion, visual changes to come straight to the ER.  Seizure  education was provided including no driving, no swimming by self, no operation of heavy machinery or climbing on ladders.     All side effects of new medications were discussed with patient and/or next of kin and all questions were answered.             Active Diagnoses:    Diagnosis Date Noted POA    PRINCIPAL PROBLEM:  Left arm weakness [R29.898] 08/20/2020 Unknown    Left arm pain [M79.602] 08/20/2020 Yes      Problems Resolved During this Admission:       VTE Risk Mitigation (From admission, onward)         Ordered     enoxaparin injection 40 mg  Every 24 hours      08/20/20 0351     IP VTE HIGH RISK PATIENT  Once      08/20/20 0351     Place sequential compression device  Until discontinued      08/20/20 0351                Thank you for your consult. I will follow-up with patient. Please contact us if you have any additional questions.    Sharon Baum NP  Neurology  [facility]

## 2020-08-21 ENCOUNTER — HOSPITAL ENCOUNTER (OUTPATIENT)
Dept: RADIOLOGY | Facility: HOSPITAL | Age: 60
Discharge: HOME | End: 2020-08-21
Attending: STUDENT IN AN ORGANIZED HEALTH CARE EDUCATION/TRAINING PROGRAM
Payer: OTHER GOVERNMENT

## 2020-08-21 ENCOUNTER — CLINICAL SUPPORT (OUTPATIENT)
Dept: CARDIOLOGY | Facility: HOSPITAL | Age: 60
End: 2020-08-21
Attending: STUDENT IN AN ORGANIZED HEALTH CARE EDUCATION/TRAINING PROGRAM
Payer: OTHER GOVERNMENT

## 2020-08-21 VITALS
HEART RATE: 62 BPM | HEIGHT: 67 IN | SYSTOLIC BLOOD PRESSURE: 121 MMHG | RESPIRATION RATE: 20 BRPM | OXYGEN SATURATION: 100 % | BODY MASS INDEX: 33.57 KG/M2 | WEIGHT: 213.88 LBS | TEMPERATURE: 98 F | DIASTOLIC BLOOD PRESSURE: 66 MMHG

## 2020-08-21 LAB
ALBUMIN SERPL BCP-MCNC: 3.4 G/DL (ref 3.5–5.2)
ALP SERPL-CCNC: 42 U/L (ref 55–135)
ALT SERPL W/O P-5'-P-CCNC: 16 U/L (ref 10–44)
ANION GAP SERPL CALC-SCNC: 4 MMOL/L (ref 8–16)
AORTIC ROOT ANNULUS: 3.4 CM
AORTIC VALVE CUSP SEPERATION: 2.13 CM
AST SERPL-CCNC: 16 U/L (ref 10–40)
AV INDEX (PROSTH): 0.72
AV MEAN GRADIENT: 4 MMHG
AV PEAK GRADIENT: 6 MMHG
AV VALVE AREA: 2.26 CM2
AV VELOCITY RATIO: 82.5
BASOPHILS # BLD AUTO: 0.03 K/UL (ref 0–0.2)
BASOPHILS NFR BLD: 0.7 % (ref 0–1.9)
BILIRUB SERPL-MCNC: 0.5 MG/DL (ref 0.1–1)
BSA FOR ECHO PROCEDURE: 2.14 M2
BUN SERPL-MCNC: 15 MG/DL (ref 6–20)
CALCIUM SERPL-MCNC: 8.1 MG/DL (ref 8.7–10.5)
CHLORIDE SERPL-SCNC: 108 MMOL/L (ref 95–110)
CO2 SERPL-SCNC: 26 MMOL/L (ref 23–29)
CREAT SERPL-MCNC: 0.7 MG/DL (ref 0.5–1.4)
CV ECHO LV RWT: 0.66 CM
CV STRESS BASE HR: 63 BPM
DIASTOLIC BLOOD PRESSURE: 70 MMHG
DIFFERENTIAL METHOD: ABNORMAL
DOP CALC AO PEAK VEL: 1.2 M/S
DOP CALC AO VTI: 27.07 CM
DOP CALC LVOT AREA: 3.1 CM2
DOP CALC LVOT DIAMETER: 2 CM
DOP CALC LVOT PEAK VEL: 99 M/S
DOP CALC LVOT STROKE VOLUME: 61.23 CM3
DOP CALCLVOT PEAK VEL VTI: 19.5 CM
E WAVE DECELERATION TIME: 252.78 MSEC
E/A RATIO: 1.29
E/E' RATIO: 9.79 M/S
ECHO LV POSTERIOR WALL: 1.23 CM (ref 0.6–1.1)
EOSINOPHIL # BLD AUTO: 0.1 K/UL (ref 0–0.5)
EOSINOPHIL NFR BLD: 2.3 % (ref 0–8)
ERYTHROCYTE [DISTWIDTH] IN BLOOD BY AUTOMATED COUNT: 16.2 % (ref 11.5–14.5)
ERYTHROCYTE [SEDIMENTATION RATE] IN BLOOD BY WESTERGREN METHOD: 0 MM/HR (ref 0–20)
EST. GFR  (AFRICAN AMERICAN): >60 ML/MIN/1.73 M^2
EST. GFR  (NON AFRICAN AMERICAN): >60 ML/MIN/1.73 M^2
ESTIMATED AVG GLUCOSE: 126 MG/DL (ref 68–131)
FRACTIONAL SHORTENING: 43 % (ref 28–44)
GLUCOSE SERPL-MCNC: 107 MG/DL (ref 70–110)
HBA1C MFR BLD HPLC: 6 % (ref 4.5–6.2)
HCT VFR BLD AUTO: 41.4 % (ref 37–48.5)
HGB BLD-MCNC: 12.2 G/DL (ref 12–16)
IMM GRANULOCYTES # BLD AUTO: 0.01 K/UL (ref 0–0.04)
IMM GRANULOCYTES NFR BLD AUTO: 0.2 % (ref 0–0.5)
INTERVENTRICULAR SEPTUM: 1.23 CM (ref 0.6–1.1)
IVRT: 70.45 MSEC
LEFT ATRIUM SIZE: 3.2 CM
LEFT INTERNAL DIMENSION IN SYSTOLE: 2.11 CM (ref 2.1–4)
LEFT VENTRICLE MASS INDEX: 74 G/M2
LEFT VENTRICULAR INTERNAL DIMENSION IN DIASTOLE: 3.72 CM (ref 3.5–6)
LEFT VENTRICULAR MASS: 154.15 G
LV LATERAL E/E' RATIO: 8.45 M/S
LV SEPTAL E/E' RATIO: 11.63 M/S
LYMPHOCYTES # BLD AUTO: 1.7 K/UL (ref 1–4.8)
LYMPHOCYTES NFR BLD: 38.8 % (ref 18–48)
MAGNESIUM SERPL-MCNC: 2.1 MG/DL (ref 1.6–2.6)
MCH RBC QN AUTO: 20.4 PG (ref 27–31)
MCHC RBC AUTO-ENTMCNC: 29.5 G/DL (ref 32–36)
MCV RBC AUTO: 69 FL (ref 82–98)
MONOCYTES # BLD AUTO: 0.4 K/UL (ref 0.3–1)
MONOCYTES NFR BLD: 8 % (ref 4–15)
MV PEAK A VEL: 0.72 M/S
MV PEAK E VEL: 0.93 M/S
NEUTROPHILS # BLD AUTO: 2.2 K/UL (ref 1.8–7.7)
NEUTROPHILS NFR BLD: 50 % (ref 38–73)
NRBC BLD-RTO: 0 /100 WBC
OHS CV CPX 1 MINUTE RECOVERY HEART RATE: 121 BPM
OHS CV CPX 85 PERCENT MAX PREDICTED HEART RATE MALE: 132
OHS CV CPX ESTIMATED METS: 8.3
OHS CV CPX MAX PREDICTED HEART RATE: 156
OHS CV CPX PATIENT IS FEMALE: 1
OHS CV CPX PATIENT IS MALE: 0
OHS CV CPX PEAK DIASTOLIC BLOOD PRESSURE: 80 MMHG
OHS CV CPX PEAK HEAR RATE: 144 BPM
OHS CV CPX PEAK RATE PRESSURE PRODUCT: NORMAL
OHS CV CPX PEAK SYSTOLIC BLOOD PRESSURE: 204 MMHG
OHS CV CPX PERCENT MAX PREDICTED HEART RATE ACHIEVED: 92
OHS CV CPX RATE PRESSURE PRODUCT PRESENTING: 6615
PISA TR MAX VEL: 2.25 M/S
PLATELET # BLD AUTO: 270 K/UL (ref 150–350)
PMV BLD AUTO: 11.2 FL (ref 9.2–12.9)
POTASSIUM SERPL-SCNC: 3.8 MMOL/L (ref 3.5–5.1)
PROT SERPL-MCNC: 6.1 G/DL (ref 6–8.4)
PV PEAK VELOCITY: 74.56 CM/S
RA PRESSURE: 3 MMHG
RBC # BLD AUTO: 5.99 M/UL (ref 4–5.4)
RIGHT VENTRICULAR END-DIASTOLIC DIMENSION: 385 CM
SODIUM SERPL-SCNC: 138 MMOL/L (ref 136–145)
STRESS ECHO POST EXERCISE DUR MIN: 6 MINUTES
STRESS ECHO POST EXERCISE DUR SEC: 45 SECONDS
STRESS ST DEPRESSION: 0.5 MM
SYSTOLIC BLOOD PRESSURE: 105 MMHG
TDI LATERAL: 0.11 M/S
TDI SEPTAL: 0.08 M/S
TDI: 0.1 M/S
TR MAX PG: 20 MMHG
TROPONIN I SERPL DL<=0.01 NG/ML-MCNC: <0.03 NG/ML
TSH SERPL DL<=0.005 MIU/L-ACNC: 1.97 UIU/ML (ref 0.34–5.6)
TV REST PULMONARY ARTERY PRESSURE: 23 MMHG
VIT B12 SERPL-MCNC: 275 PG/ML (ref 210–950)
WBC # BLD AUTO: 4.36 K/UL (ref 3.9–12.7)

## 2020-08-21 PROCEDURE — 25000003 PHARM REV CODE 250: Performed by: INTERNAL MEDICINE

## 2020-08-21 PROCEDURE — 84425 ASSAY OF VITAMIN B-1: CPT

## 2020-08-21 PROCEDURE — 99900035 HC TECH TIME PER 15 MIN (STAT)

## 2020-08-21 PROCEDURE — 80053 COMPREHEN METABOLIC PANEL: CPT

## 2020-08-21 PROCEDURE — 84207 ASSAY OF VITAMIN B-6: CPT

## 2020-08-21 PROCEDURE — 84443 ASSAY THYROID STIM HORMONE: CPT

## 2020-08-21 PROCEDURE — A9502 TC99M TETROFOSMIN: HCPCS

## 2020-08-21 PROCEDURE — G0378 HOSPITAL OBSERVATION PER HR: HCPCS

## 2020-08-21 PROCEDURE — 84484 ASSAY OF TROPONIN QUANT: CPT

## 2020-08-21 PROCEDURE — 93017 CV STRESS TEST TRACING ONLY: CPT

## 2020-08-21 PROCEDURE — 94761 N-INVAS EAR/PLS OXIMETRY MLT: CPT

## 2020-08-21 PROCEDURE — 83036 HEMOGLOBIN GLYCOSYLATED A1C: CPT

## 2020-08-21 PROCEDURE — 85025 COMPLETE CBC W/AUTO DIFF WBC: CPT

## 2020-08-21 PROCEDURE — 83735 ASSAY OF MAGNESIUM: CPT

## 2020-08-21 PROCEDURE — 82607 VITAMIN B-12: CPT

## 2020-08-21 PROCEDURE — 36415 COLL VENOUS BLD VENIPUNCTURE: CPT

## 2020-08-21 PROCEDURE — 85651 RBC SED RATE NONAUTOMATED: CPT

## 2020-08-21 RX ADMIN — ASPIRIN 81 MG: 81 TABLET, DELAYED RELEASE ORAL at 12:08

## 2020-08-21 RX ADMIN — HYDROCODONE BITARTRATE AND ACETAMINOPHEN 1 TABLET: 5; 325 TABLET ORAL at 12:08

## 2020-08-21 NOTE — NURSING
Discharge instructions reviewed with pt. Verbalized understanding. States  will not be here to pick her up until after 6pm. Requests to have a regular tray at dinner. No other questions/needs voiced. Will continue to monitor. Fall precautions reviewed. Call light in reach.

## 2020-08-21 NOTE — PROGRESS NOTES
@  10:40   PATIENT INJECTED WITH        MYOVIEW FOR STRESS IMAGES.        TREADMILL STRESS    RELEASE NPO STATUS FROM NUCLEAR MEDICINE.           [facility]

## 2020-08-21 NOTE — PROGRESS NOTES
Myocardial Perfusion Rest injection R arm IV at 08:00    Patient to remain NPO status    JOE SILVEIRA

## 2020-08-21 NOTE — PROGRESS NOTES
Myocardial perfusion  Stress Images in progress at 11:13.    -Myocardial  Perfusion images will be completed at 11:23    [facility]

## 2020-08-21 NOTE — HOSPITAL COURSE
Patient was admitted with possible GI bleed. patient's seem melena and stool and occult blood was done and positive.  Patient was on Eliquis for atrial fibrillation and it was hold.  EGD was done and no significant source of bleeding and planned to proceed with colonoscopy the next day.  But patient and family refused colonoscopy in house and later appointment with GI MD given on Monday for possible colonoscopy on Wednesday .  Her Eliquis was held until she see GI MD Monday.continued aspirin.

## 2020-08-21 NOTE — DISCHARGE SUMMARY
[facility] Medicine  Discharge Summary      Patient Name: Marie Salcido  MRN: [id number]  Admission Date: 8/20/2020  Hospital Length of Stay: 0 days  Discharge Date and Time:  08/21/2020 3:57 PM  Attending Physician: Imtiaz Shah MD   Discharging Provider: Imtiaz Shah MD  Primary Care Provider: Yannick Bell MD      HPI:   Admission note     Marie Salcido is a 59 y.o. Black or  female who  has a past medical history of Anemia, Coronary artery disease, Hyperlipidemia, Hypertension, Silent myocardial infarction, and Sleep apnea.. The patient presented to [facility] on 8/20/2020 with a primary complaint of Arm Pain (Left arm x2 days) and Back Pain (Left upper back since tonight)     History was obtained from the patient, and ER physician Sign-out. Patient reports left arm weakness associated with back pain x 2 days. She describes the pain as episodic and its been going for last 2 years however recently it got worst. She said last time when she came here for the same complaints she was diagnosed with a stroke and thinks that she might be getting another stroke. She takes aspirin at home. She denies lifting heavy weight.      In the emergency room, patient CBC was unremarkable. CMP was unremarkable. CT scan head negative    * No surgery found *      Hospital Course:   Patient was admitted with possible stroke/TIA.  She president with arm pains.  Neurological workup was negative with negative MRI of the brain.  Also later stress test was done with Lexiscan Myoview and that was negative for ischemia and later all symptoms resolved and patient was discharged in stable condition to follow-up with primary care doctor  Consults:   Consults (From admission, onward)        Status Ordering Provider     Inpatient consult to Hospitalist  Once     Provider:  Rodri Pugh MD    Acknowledged DIGNA BRANDON     Inpatient consult to Neurology  Once     Provider:  Lane  Rito Cochran MD    Completed JEF BERTRAND          No new Assessment & Plan notes have been filed under this hospital service since the last note was generated.  Service: Hospital Medicine    Final Active Diagnoses:    Diagnosis Date Noted POA    PRINCIPAL PROBLEM:  Left arm weakness [R29.898] 08/20/2020 Unknown    Left arm pain [M79.602] 08/20/2020 Yes      Problems Resolved During this Admission:       Discharged Condition: good    Disposition: Home or Self Care    Follow Up:  Follow-up Information     Yannick Bell MD In 2 weeks.    Specialty: Family Medicine  Contact information:  [address]  [place] [address]  [phone]                 Patient Instructions:      Diet Cardiac     Notify your health care provider if you experience any of the following:  severe uncontrolled pain     Activity as tolerated       Significant Diagnostic Studies: Labs:   CMP   Recent Labs   Lab 08/20/20 0237 08/21/20 0409    138   K 3.5 3.8    108   CO2 26 26    107   BUN 13 15   CREATININE 0.7 0.7   CALCIUM 8.4* 8.1*   PROT 6.9 6.1   ALBUMIN 3.9 3.4*   BILITOT 0.5 0.5   ALKPHOS 47* 42*   AST 18 16   ALT 21 16   ANIONGAP 9 4*   ESTGFRAFRICA >60.0 >60.0   EGFRNONAA >60.0 >60.0   , CBC   Recent Labs   Lab 08/20/20 0237 08/21/20 0409   WBC 5.35 4.36   HGB 12.0 12.2   HCT 40.2 41.4    270    and INR   Lab Results   Component Value Date    INR 1.1 02/24/2017    INR 1.2 10/04/2013    INR 1.0 10/03/2013       Pending Diagnostic Studies:     Procedure Component Value Units Date/Time    Vitamin B1 [[id number]] Collected: 08/21/20 0409    Order Status: Sent Lab Status: In process Updated: 08/21/20 0435    Specimen: Blood     Vitamin B6 [[id number]] Collected: 08/21/20 0409    Order Status: Sent Lab Status: In process Updated: 08/21/20 0712    Specimen: Blood          Medications:  Reconciled Home Medications:      Medication List      CONTINUE taking these medications    aspirin 81 MG EC  tablet  Commonly known as: ECOTRIN  Take 81 mg by mouth once daily.     atorvastatin 40 MG tablet  Commonly known as: LIPITOR  Take 1 tablet (40 mg total) by mouth once daily.     cetirizine 10 MG tablet  Commonly known as: ZYRTEC  Take 1 tablet (10 mg total) by mouth once daily.     famotidine 20 MG tablet  Commonly known as: PEPCID  Take 1 tablet (20 mg total) by mouth 2 (two) times daily.     fish oil-omega-3 fatty acids 300-1,000 mg capsule  Take by mouth once daily.     fluticasone propionate 50 mcg/actuation nasal spray  Commonly known as: FLONASE  2 sprays (100 mcg total) by Each Nostril route 2 (two) times daily.     lisinopriL 10 MG tablet  Take 1 tablet (10 mg total) by mouth once daily.     methylPREDNISolone 4 mg tablet  Commonly known as: MEDROL DOSEPACK  use as directed     pantoprazole 40 MG tablet  Commonly known as: PROTONIX  Take 1 tablet (40 mg total) by mouth once daily.        STOP taking these medications    metoprolol tartrate 25 MG tablet  Commonly known as: LOPRESSOR            Indwelling Lines/Drains at time of discharge:   Lines/Drains/Airways     None             Physical Exam:  General- Patient alert and oriented x3 in NAD  HEENT- PERRLA, EOMI, OP clear, MMM  Neck- No JVD, Lymphadenopathy, Thyromegaly  CV- Regular rate and rhythm, No Murmur/junior/rubs  Resp- Lungs CTA Bilaterally, No increased WOB  GI- Non tender/non-distended, BS normoactive x4 quads, no HSM  Extrem- No cyanosis, clubbing, edema. Pulses 2+ and symmetric  Neuro- Strength 5/5 flexors/extensors, DTRs 2+ and symmetric, Intact sensation to light touch grossly  Skin-  No masses, rashes or lesions noted on cursory skin exam.    Time spent on the discharge of patient:23 minutes  Patient was seen and examined on the date of discharge and determined to be suitable for discharge.         Imtiaz Shah MD  Department of Hospital Medicine  [facility]

## 2020-08-21 NOTE — PLAN OF CARE
08/21/20 1437   Final Note   Assessment Type Final Discharge Note   Anticipated Discharge Disposition Home   Post-Acute Status   Post-Acute Authorization Other   Other Status No Post-Acute Service Needs   Discharge Delays None known at this time

## 2020-08-21 NOTE — PROGRESS NOTES
[facility]  Neurology  Progress Note    Patient Name: Marie Salcido  MRN: [id number]  Admission Date: 8/20/2020  Hospital Length of Stay: 0 days  Code Status: Full Code   Attending Provider: Imtiaz Shah MD  Primary Care Physician: Yannick Bell MD   Principal Problem:Left arm weakness    Subjective:      HPI:Marie Salcido is a 59 y.o. Black or  female who  has a past medical history of Anemia, Coronary artery disease, Hyperlipidemia, Hypertension, Silent myocardial infarction, and Sleep apnea.. The patient presented to [facility] on 8/20/2020 with a primary complaint of Arm Pain (Left arm x2 days) and Back Pain (Left upper back since tonight)     History was obtained from the patient, and ER physician Sign-out. Patient reports left arm weakness associated with back pain x 2 days. She describes the pain as episodic and its been going for last 2 years however recently it got worst. She said last time when she came here for the same complaints she was diagnosed with a stroke and thinks that she might be getting another stroke. She takes aspirin at home. She denies lifting heavy weight.      In the emergency room, patient CBC was unremarkable. CMP was unremarkable. CT scan head negative     Decision to admit was taken and patient was informed about the plan of care.         Neurology Consult Note: Patient seen and examined with  Dr. Rito Cochran. Discussed plan of care. No family present. Patient is a 59 year old AA female with PMHX: Anemia, Coronary artery disease, HLD, HTN, MI, Sleep apena. Patient complains of neck and back pain that radiates down her left arm. She also reports left arm numbness, and weakness. The patient has a normal neuro focal exam with the exception of 4/5 weakness of left upper extremity no pronator drift noted. Reflexes normal. Patient is Alert awake oriented x4 NADN, No SOB noted. respiratory regular even unalabored. Stroke work up in progress  MRI of brain w/o contrast Negative for acute ischemia or acute intracranial pathology.   Minimal white matter microangiopathic changes. MRA brain negative, CUS No hemodynamically significant internal carotid artery stenosis and TTE w/bubble pending. Suspect Cervical radiculopathy but will Rule out Cord Compression or Entrapment  Will order MRI of C-spine and T-spine w/o contrast. If negative patient may benefit from out patient EMG/NCS  Will continue to follow.       Neurology Interval Note: Patient seen and examine. Patient back to base. Stroke work up negative for acute stroke. MRI of C-spine Straightening of the normal cervical spinal curvature, which could be positional, or reflective of paraspinal muscular spasm.  Multilevel cervical disc bulging, with no focal disc herniation or significant spinal canal stenosis at any level. and T-spine w/o contrast Negative MRI of the thoracic spine. Recommend follow up outpatient may benefit from EMG/NCS and Physical therapy outpatient for further workup.    Current Neurological Medications:     Current Facility-Administered Medications   Medication Dose Route Frequency Provider Last Rate Last Dose    acetaminophen tablet 650 mg  650 mg Oral Q4H PRN Rodri Pugh MD        aspirin EC tablet 81 mg  81 mg Oral Daily Rodri Pugh MD   81 mg at 08/20/20 0808    atorvastatin tablet 40 mg  40 mg Oral Daily Rodri Pugh MD        dextrose 50% injection 12.5 g  12.5 g Intravenous PRN Rodri Pugh MD        dextrose 50% injection 25 g  25 g Intravenous PRN Rodri Pugh MD        enoxaparin injection 40 mg  40 mg Subcutaneous Q24H Rodri Pugh MD   40 mg at 08/20/20 1607    glucagon (human recombinant) injection 1 mg  1 mg Intramuscular PRN Rodri Pugh MD        glucose chewable tablet 16 g  16 g Oral PRN Rodri Pugh MD        glucose chewable tablet 24 g  24 g Oral PRN Rodri Pugh MD         HYDROcodone-acetaminophen 5-325 mg per tablet 1 tablet  1 tablet Oral Q6H PRN Rodri Pugh MD   1 tablet at 08/20/20 2336    lisinopriL tablet 10 mg  10 mg Oral Daily Rodri Pugh MD        melatonin tablet 9 mg  9 mg Oral Nightly PRN Rodri Pugh MD   9 mg at 08/20/20 2336    metoprolol tartrate (LOPRESSOR) tablet 25 mg  25 mg Oral BID Rodri Pugh MD        ondansetron disintegrating tablet 8 mg  8 mg Oral Q8H PRN Rodri Pugh MD        pantoprazole EC tablet 40 mg  40 mg Oral Daily Rodri Pugh MD   40 mg at 08/20/20 0808    sodium chloride 0.9% flush 3 mL  3 mL Intravenous Q6H PRN Rodri Pugh MD           Review of Systems   Constitutional: Negative.    HENT: Negative.    Eyes: Negative.    Respiratory: Negative.    Cardiovascular: Negative.    Gastrointestinal: Negative.    Endocrine: Negative.    Genitourinary: Negative.    Musculoskeletal: Negative for myalgias and neck pain.   Skin: Negative.    Allergic/Immunologic: Negative.    Neurological: Positive for numbness. Negative for weakness.   Hematological: Negative.    Psychiatric/Behavioral: Negative.      Objective:     Vital Signs (Most Recent):  Temp: 97.8 °F (36.6 °C) (08/21/20 0720)  Pulse: 62 (08/21/20 0720)  Resp: 20 (08/21/20 0720)  BP: 121/66 (08/21/20 0720)  SpO2: 100 % (08/21/20 0720) Vital Signs (24h Range):  Temp:  [97.7 °F (36.5 °C)-98.7 °F (37.1 °C)] 97.8 °F (36.6 °C)  Pulse:  [62-69] 62  Resp:  [13-39] 20  SpO2:  [98 %-100 %] 100 %  BP: (107-135)/(55-73) 121/66     Weight: 97 kg (213 lb 13.5 oz)  Body mass index is 33.49 kg/m².    Physical Exam  Constitutional:       General: She is awake.      Appearance: Normal appearance. She is well-developed.   HENT:      Head: Normocephalic and atraumatic.      Nose: Nose normal.      Mouth/Throat:      Mouth: Mucous membranes are moist.      Pharynx: Oropharynx is clear.   Eyes:      General: No visual field deficit.     Extraocular Movements:  Extraocular movements intact.      Pupils: Pupils are equal, round, and reactive to light.   Neck:      Musculoskeletal: Normal range of motion.   Cardiovascular:      Rate and Rhythm: Normal rate.      Pulses: Normal pulses.   Pulmonary:      Effort: Pulmonary effort is normal.      Breath sounds: Normal breath sounds.   Abdominal:      General: Bowel sounds are normal.      Palpations: Abdomen is soft.   Skin:     General: Skin is warm and dry.      Capillary Refill: Capillary refill takes less than 2 seconds.   Neurological:      General: No focal deficit present.      Mental Status: She is alert and oriented to person, place, and time.      GCS: GCS eye subscore is 4 - spontaneous. GCS verbal subscore is 5 - oriented. GCS motor subscore is 6 - obeys commands.      Cranial Nerves: No cranial nerve deficit, dysarthria or facial asymmetry.      Sensory: No sensory deficit.      Motor: Weakness present. No tremor, atrophy, abnormal muscle tone, seizure activity or pronator drift.      Coordination: Coordination is intact. Coordination normal. Finger-Nose-Finger Test normal.      Gait: Gait is intact. Gait normal.      Deep Tendon Reflexes: Reflexes normal.      Reflex Scores:       Tricep reflexes are 2+ on the right side and 2+ on the left side.       Bicep reflexes are 2+ on the right side and 2+ on the left side.       Brachioradialis reflexes are 2+ on the right side and 2+ on the left side.       Patellar reflexes are 2+ on the right side and 2+ on the left side.       Achilles reflexes are 2+ on the right side and 2+ on the left side.  Psychiatric:         Attention and Perception: Attention normal.         Mood and Affect: Mood normal.         Speech: Speech normal.         Behavior: Behavior normal. Behavior is cooperative.         Thought Content: Thought content normal.         Cognition and Memory: Cognition and memory normal.         Judgment: Judgment normal.         NEUROLOGICAL EXAMINATION:     MENTAL  STATUS   Oriented to person, place, and time.   Speech: speech is normal     CRANIAL NERVES     CN III, IV, VI   Pupils are equal, round, and reactive to light.    REFLEXES     Reflexes   Right brachioradialis: 2+  Left brachioradialis: 2+  Right biceps: 2+  Left biceps: 2+  Right triceps: 2+  Left triceps: 2+  Right patellar: 2+  Left patellar: 2+  Right achilles: 2+  Left achilles: 2+    GAIT AND COORDINATION     Gait  Gait: normal     Coordination   Finger to nose coordination: normal       Significant Imaging:   Narrative & Impression       MRA BRAIN WITHOUT CONTRAST     CLINICAL HISTORY:  59 years Female Numbness or tingling, paresthesia (Ped 0-18y)     COMPARISON: None     FINDINGS: 3D TOF MRA without contrast was performed, with maximum  intensity projections reviewed.     The internal carotid and vertebrobasilar systems are patent, without  evidence of aneurysm, vascular occlusion, or significant stenosis. The  anterior, middle and posterior cerebral arteries demonstrate no  aneurysm, stenosis or vascular malformation.     IMPRESSION: Negative MRA head.     Electronically Signed by Dorian ARNDT on 8/20/2020 11:48 AM   MRI BRAIN WITHOUT CONTRAST     CLINICAL HISTORY:  59 years Female Headache, chronic, with new features     COMPARISON: Noncontrast CT head August 20, 2020     FINDINGS: Diffusion-weighted imaging is negative for acute ischemia or  focal lesion. Gradient echo images show no evidence of intracranial  hemorrhage.     No intracranial mass, midline shift, or mass effect.  Ventricles and sulci are normal in size. Minimal periventricular and  deep white matter T2/FLAIR hyperintensities, nonspecific, most likely  secondary to microangiopathic change. Cerebellar hemispheres and  brainstem are unremarkable. Major intracranial T2 flow-voids are  patent.     Mastoid air cells are clear. Paranasal sinuses are clear. Orbits are  unremarkable.     No bone marrow signal abnormality. Pituitary gland is  unremarkable.     IMPRESSION:     Negative for acute ischemia or acute intracranial pathology.     Minimal white matter microangiopathic changes.     Impression       Reason: Left arm weakness     Grayscale, color and spectral Doppler analysis was performed. Criteria  for stenosis is based upon the Society of Radiologists in Ultrasound  Consensus Conference, 2003 (Radiology, November 2003, 229, 340-346).     Comparison: None     Findings:  Grayscale images show there is mild intimal wall thickening and  scattered plaque     Estimated peak systolic velocity in right internal carotid artery is  53 cm/s. Antegrade flow is present in the right vertebral artery.     Estimated peak systolic velocity in left internal carotid artery is 71  cm/s. Antegrade flow is present in the left vertebral artery.     Impression:  No hemodynamically significant internal carotid artery stenosis.     Impression       Reason: Left arm weakness     Grayscale, color and spectral Doppler analysis was performed. Criteria  for stenosis is based upon the Society of Radiologists in Ultrasound  Consensus Conference, 2003 (Radiology, November 2003, 229, 340-346).     Comparison: None     Findings:  Grayscale images show there is mild intimal wall thickening and  scattered plaque     Estimated peak systolic velocity in right internal carotid artery is  53 cm/s. Antegrade flow is present in the right vertebral artery.     Estimated peak systolic velocity in left internal carotid artery is 71  cm/s. Antegrade flow is present in the left vertebral artery.     Impression:  No hemodynamically significant internal carotid artery stenosis.                      Lab Results   Component Value Date     LDLCALC 69.4 08/20/2020            Narrative & Impression     HISTORY: Neck pain, cervical radiculopathy.     FINDINGS: Multiplanar T1, T2, GRE and STIR weighted noncontrast  imaging through the cervical spine was performed. Comparison to  multiple prior exams  including CT of 06/10/2019.     There is straightening of the normal cervical spinal curvature, with  normal vertebral body heights and alignment, and no acute fractures or  evidence of a diffuse marrow replacement process. Degenerative loss of  disc height and signal involves all levels, with ventral osteophytes  at multiple levels, and mild heterogeneous degenerative type marrow  endplate signal. T1 and T2 hyperintense osseous hemangioma involves  the posterior C4 vertebral body. The visualized posterior fossa,  cervicomedullary junction, and cervical cord are normal in signal.     At C2-C3, there is no significant abnormality.     At C3-C4, there is mild broad-based central disc bulging, without  significant spinal canal stenosis or neural foraminal narrowing.     At C4-C5, there is broad-based disc bulging and osteophytic ridging,  which indents the ventral thecal sac and contacts the ventral cervical  cord, without significant mass effect or spinal canal stenosis. There  is no neural foraminal narrowing.     At C5-C6, and C6-C7, there is minimal broad-based disc bulging,  without spinal canal stenosis or neural foraminal narrowing.     At C7-T1, there is no significant abnormality.     There is no significant facet arthropathy, with normal signal  intensity of the paraspinal ligaments and paraspinal soft tissues.  There are no enlarged cervical chain lymph nodes, with note of  aberrant medial retropharyngeal course of the right carotid arteries.     IMPRESSION:  1. Straightening of the normal cervical spinal curvature, which could  be positional, or reflective of paraspinal muscular spasm.  2. Multilevel cervical disc bulging, with no focal disc herniation or  significant spinal canal stenosis at any level.     Electronically Signed by Tucker ARNDT on 8/21/2020 8:19 AM     Narrative & Impression     HISTORY: Multiple sclerosis, mid back pain, compression fracture  suspected.     FINDINGS: Multiplanar T1,  T2 and STIR weighted noncontrast imaging  through the thoracic spine was performed. Comparison to multiple prior  exams.     The thoracic spine has normal curvature normal vertebral body  alignment, with normal vertebral heights, and no acute fractures or  evidence of a diffuse marrow replacement process. The intervertebral  discs are fairly well preserved in height and signal. The thoracic  cord is normal in signal throughout, with no cord expansion or syrinx.  No findings of demyelination.     Axial images show no significant disc bulging or focal disc  herniation, with no spinal canal stenosis or neural foraminal  narrowing at any thoracic level. There is no significant facet or  costovertebral arthropathy.     There are no signal abnormalities of the paraspinal soft tissues or  the paraspinal ligaments. Signal intensity of the visualized posterior  mediastinum and upper abdomen is unremarkable.     IMPRESSION: Negative MRI of the thoracic spine.     Electronically Signed by Tucker ARNDT on 8/21/2020 2:03 PM            Specimen Collected: 08/20/20 20:49   Last Resulted: 08/21/20 13:09   Order Details View Encounter Lab and Collection Details Routing Result History            Assessment and Plan:  59 year old AA female with impression:  1. Ruled Acute Stroke  2. Numbness and Tingling  3. Chronic Neck and Back Pain  4. Left arm weakness  5. Suspect Cervical radiculopathy/ Rule out Cord Compression or Entrapment    -MRI of brain w/o contrast Negative for acute ischemia or acute intracranial pathology. Minimal white matter microangiopathic changes.   -MRA brain negative  - TTE w/bubble pending    -CUS No hemodynamically significant internal carotid artery stenosis.   LDL-69.4 below goal  -TSH vitamin B1, B6, and Vitamin B12 ordered  Hemoglobin A 1c ordered   -MRI of C-spine Straightening of the normal cervical spinal curvature, which could be positional, or reflective of paraspinal muscular spasm.  2. Multilevel  cervical disc bulging, with no focal disc herniation or  significant spinal canal stenosis at any level. and T-spine w/o contrast Negative MRI of the thoracic spine.     If negative patient may benefit from EMG/NCS outpatient for further workup.      8/20: Stroke work up in progress negative thus far: MRI of brain w/o contrast Negative for acute ischemia or acute intracranial pathology. Minimal white matter microangiopathic changes. MRA brain negative, CUS No hemodynamically significant internal carotid artery stenosis and TTE w/bubble pending. I Suspect Cervical radiculopathy but will Rule out Cord Compression or Entrapment will order Vitamin B12 level, & TSH,  MRI of C-spine and T-spine w/o contrast d If negative patient may benefit from EMG/NCS outpatient for further workup.    Will continue to follow.      8/21: Patient seen and examine. Patient back to base. Stroke work up negative for acute stroke. MRI of C-spine Straightening of the normal cervical spinal curvature, which could be positional, or reflective of paraspinal muscular spasm.  Multilevel cervical disc bulging, with no focal disc herniation or significant spinal canal stenosis at any level. MRI T-spine w/o contrast Negative MRI of the thoracic spine. Recommend follow up outpatient may benefit from EMG/NCS and Physical therapy outpatient for further workup.    Patient to follow up with [facility] at [phone] within 3 days from discharge.     Stroke education was provided including stroke risk factors modification and any acute neurological changes including weakness, confusion, visual changes to come straight to the ER.  Seizure education was provided including no driving, no swimming by self, no operation of heavy machinery or climbing on ladders.     All side effects of new medications were discussed with patient and/or next of kin and all questions were answered.                                                                                   Active Diagnoses:    Diagnosis Date Noted POA    PRINCIPAL PROBLEM:  Left arm weakness [R29.898] 08/20/2020 Unknown    Left arm pain [M79.602] 08/20/2020 Yes      Problems Resolved During this Admission:       VTE Risk Mitigation (From admission, onward)         Ordered     enoxaparin injection 40 mg  Every 24 hours      08/20/20 0351     IP VTE HIGH RISK PATIENT  Once      08/20/20 0351     Place sequential compression device  Until discontinued      08/20/20 0351                Sharon Baum NP  Neurology  [facility]

## 2020-08-21 NOTE — HPI
Admission note     Marie Salcido is a 59 y.o. Black or  female who  has a past medical history of Anemia, Coronary artery disease, Hyperlipidemia, Hypertension, Silent myocardial infarction, and Sleep apnea.. [facility] with a primary complaint of Arm Pain (Left arm x2 days) and Back Pain (Left upper back since tonight)     History was obtained from the patient, and ER physician Sign-out. Patient reports left arm weakness associated with back pain x 2 days. She describes the pain as episodic and its been going for last 2 years however recently it got worst. She said last time when she came here for the same complaints she was diagnosed with a stroke and thinks that she might be getting another stroke. She takes aspirin at home. She denies lifting heavy weight.      In the emergency room, patient CBC was unremarkable. CMP was unremarkable. CT scan head negative

## 2020-08-21 NOTE — PROGRESS NOTES
-Myocardial perfusion resting images in progress at 08:51    -Patient to remain NPO status    JOE Dey MT

## 2020-08-24 LAB — VIT B6 SERPL-MCNC: 11.9 UG/L (ref 2–32.8)

## 2020-08-30 LAB — VIT B1 BLD-SCNC: 53.9 NMOL/L (ref 66.5–200)

## 2020-10-05 DIAGNOSIS — Z12.31 ENCOUNTER FOR SCREENING MAMMOGRAM FOR MALIGNANT NEOPLASM OF BREAST: Primary | ICD-10-CM

## 2020-11-25 ENCOUNTER — HOSPITAL ENCOUNTER (OUTPATIENT)
Dept: RADIOLOGY | Facility: HOSPITAL | Age: 60
Discharge: HOME | End: 2020-11-25
Attending: FAMILY MEDICINE
Payer: OTHER GOVERNMENT

## 2020-11-25 DIAGNOSIS — Z12.31 ENCOUNTER FOR SCREENING MAMMOGRAM FOR MALIGNANT NEOPLASM OF BREAST: ICD-10-CM

## 2020-11-25 PROCEDURE — 77067 SCR MAMMO BI INCL CAD: CPT | Mod: TC,PO

## 2020-11-27 ENCOUNTER — OFFICE VISIT (OUTPATIENT)
Dept: URGENT CARE | Facility: CLINIC | Age: 60
End: 2020-11-27
Payer: OTHER GOVERNMENT

## 2020-11-27 VITALS
OXYGEN SATURATION: 97 % | DIASTOLIC BLOOD PRESSURE: 86 MMHG | TEMPERATURE: 97 F | HEART RATE: 81 BPM | SYSTOLIC BLOOD PRESSURE: 138 MMHG

## 2020-11-27 DIAGNOSIS — R09.81 NASAL CONGESTION: ICD-10-CM

## 2020-11-27 DIAGNOSIS — H92.09 EAR ACHE: ICD-10-CM

## 2020-11-27 DIAGNOSIS — R09.89 RUNNY NOSE: ICD-10-CM

## 2020-11-27 DIAGNOSIS — J02.9 SORE THROAT: ICD-10-CM

## 2020-11-27 PROCEDURE — 99204 OFFICE O/P NEW MOD 45 MIN: CPT | Mod: 25,S$GLB,, | Performed by: EMERGENCY MEDICINE

## 2020-11-27 PROCEDURE — 99204 PR OFFICE/OUTPT VISIT, NEW, LEVL IV, 45-59 MIN: ICD-10-PCS | Mod: 25,S$GLB,, | Performed by: EMERGENCY MEDICINE

## 2020-11-27 PROCEDURE — 96372 PR INJECTION,THERAP/PROPH/DIAG2ST, IM OR SUBCUT: ICD-10-PCS | Mod: S$GLB,,, | Performed by: EMERGENCY MEDICINE

## 2020-11-27 PROCEDURE — U0003 INFECTIOUS AGENT DETECTION BY NUCLEIC ACID (DNA OR RNA); SEVERE ACUTE RESPIRATORY SYNDROME CORONAVIRUS 2 (SARS-COV-2) (CORONAVIRUS DISEASE [COVID-19]), AMPLIFIED PROBE TECHNIQUE, MAKING USE OF HIGH THROUGHPUT TECHNOLOGIES AS DESCRIBED BY CMS-2020-01-R: HCPCS

## 2020-11-27 PROCEDURE — 96372 THER/PROPH/DIAG INJ SC/IM: CPT | Mod: S$GLB,,, | Performed by: EMERGENCY MEDICINE

## 2020-11-27 RX ORDER — AMOXICILLIN 875 MG/1
875 TABLET, FILM COATED ORAL EVERY 12 HOURS
Qty: 14 TABLET | Refills: 0 | Status: SHIPPED | OUTPATIENT
Start: 2020-11-27 | End: 2020-12-04

## 2020-11-27 RX ORDER — DEXAMETHASONE SODIUM PHOSPHATE 4 MG/ML
8 INJECTION, SOLUTION INTRA-ARTICULAR; INTRALESIONAL; INTRAMUSCULAR; INTRAVENOUS; SOFT TISSUE
Status: COMPLETED | OUTPATIENT
Start: 2020-11-27 | End: 2020-11-27

## 2020-11-27 RX ADMIN — DEXAMETHASONE SODIUM PHOSPHATE 8 MG: 4 INJECTION, SOLUTION INTRA-ARTICULAR; INTRALESIONAL; INTRAMUSCULAR; INTRAVENOUS; SOFT TISSUE at 12:11

## 2020-11-27 NOTE — PROGRESS NOTES
"Subjective:       Patient ID: Marie Salcido is a 60 y.o. female.    Chief Complaint: No chief complaint on file.    Patient presents today for COVID testing. She has no known exposure. "just want to be tested" Her symptoms include, PND, cough, vomiting.     Review of Systems   Constitutional: Negative for chills, fatigue and fever.   HENT: Negative for congestion.    Respiratory: Negative for cough and shortness of breath.    Cardiovascular: Negative for chest pain.   Gastrointestinal: Negative for abdominal pain, diarrhea, nausea and vomiting.   Genitourinary: Negative for dysuria.   Musculoskeletal: Negative for myalgias and neck stiffness.   Neurological: Negative for dizziness, light-headedness and headaches.       Objective:      Vitals:    11/27/20 1139   BP: 138/86   Pulse: 81   Temp: 97 °F (36.1 °C)     Physical Exam  Constitutional:       General: She is not in acute distress.     Appearance: Normal appearance. She is normal weight. She is not ill-appearing.   HENT:      Head: Normocephalic and atraumatic.      Right Ear: External ear normal.      Left Ear: External ear normal.      Mouth/Throat:      Comments: cobblestoning and PND noted.  Eyes:      General: No scleral icterus.  Cardiovascular:      Rate and Rhythm: Normal rate and regular rhythm.      Heart sounds: Normal heart sounds.   Pulmonary:      Effort: Pulmonary effort is normal.      Breath sounds: Normal breath sounds.   Skin:     Capillary Refill: Capillary refill takes less than 2 seconds.   Neurological:      Mental Status: She is alert and oriented to person, place, and time.   Psychiatric:         Mood and Affect: Mood normal.         Behavior: Behavior normal.         Thought Content: Thought content normal.         Judgment: Judgment normal.         Assessment:       1. Nasal congestion    2. Runny nose    3. Sore throat    4. Ear ache        Plan:       Patient is specifically requesting a steroid shot and antibiotic. Discussed with " "patient that it is not normally protocol to prescribe antibiotics if there are no signs of bacterial infection. She was upset by this and stated that her "normal doctor" prescribes her antibiotics.     Nasal congestion  -     Cancel: COVID-19 Routine Screening  -     COVID-19 Routine Screening  -     amoxicillin (AMOXIL) 875 MG tablet; Take 1 tablet (875 mg total) by mouth every 12 (twelve) hours. for 7 days  Dispense: 14 tablet; Refill: 0    Runny nose  -     Cancel: COVID-19 Routine Screening  -     COVID-19 Routine Screening    Sore throat  -     Cancel: COVID-19 Routine Screening  -     COVID-19 Routine Screening  -     dexamethasone injection 8 mg  -     amoxicillin (AMOXIL) 875 MG tablet; Take 1 tablet (875 mg total) by mouth every 12 (twelve) hours. for 7 days  Dispense: 14 tablet; Refill: 0    Ear ache  -     Cancel: COVID-19 Routine Screening  -     COVID-19 Routine Screening  -     amoxicillin (AMOXIL) 875 MG tablet; Take 1 tablet (875 mg total) by mouth every 12 (twelve) hours. for 7 days  Dispense: 14 tablet; Refill: 0      No follow-ups on file.        "

## 2020-11-29 ENCOUNTER — TELEPHONE (OUTPATIENT)
Dept: URGENT CARE | Facility: CLINIC | Age: 60
End: 2020-11-29

## 2020-11-29 LAB — SARS-COV-2 RNA RESP QL NAA+PROBE: NOT DETECTED

## 2020-11-29 NOTE — TELEPHONE ENCOUNTER
----- Message from Gloria Brownlee NP sent at 11/29/2020 10:54 AM CST -----  Please advise patient that covid test is negative.

## 2020-11-30 ENCOUNTER — TELEPHONE (OUTPATIENT)
Dept: FAMILY MEDICINE | Facility: CLINIC | Age: 60
End: 2020-11-30

## 2020-11-30 NOTE — TELEPHONE ENCOUNTER
Order was placed by Deirdre Hua NP, not by me.  PCP is Dr. Bell, I had nothing to do with it.  RWT

## 2020-11-30 NOTE — TELEPHONE ENCOUNTER
"----- Message from Erica Soto MA sent at 11/27/2020 11:42 AM CST -----  Regarding: Cancellation of Order # [id number]  Order number [id number] for the procedure SARS-COV-2 (COVID-19)   QUALITATIVE PCR [[id number]] has been canceled by Erica Soto MA [[id number]]. This procedure was ordered by Manjinder Soto MD   [[id number]] on Nov 27, 2020 for the patient Marie Salcido   [[id number]]. The reason for cancellation was "Entered in Error".  "

## 2021-02-03 ENCOUNTER — HOSPITAL ENCOUNTER (EMERGENCY)
Facility: HOSPITAL | Age: 61
Discharge: HOME | End: 2021-02-03
Attending: EMERGENCY MEDICINE
Payer: OTHER GOVERNMENT

## 2021-02-03 VITALS
HEIGHT: 67 IN | BODY MASS INDEX: 33.39 KG/M2 | OXYGEN SATURATION: 100 % | DIASTOLIC BLOOD PRESSURE: 86 MMHG | TEMPERATURE: 99 F | SYSTOLIC BLOOD PRESSURE: 175 MMHG | WEIGHT: 212.75 LBS | RESPIRATION RATE: 18 BRPM | HEART RATE: 67 BPM

## 2021-02-03 DIAGNOSIS — R52 PAIN: ICD-10-CM

## 2021-02-03 DIAGNOSIS — R10.2 PELVIC PAIN: ICD-10-CM

## 2021-02-03 DIAGNOSIS — M79.10 MYALGIA: Primary | ICD-10-CM

## 2021-02-03 LAB
ALBUMIN SERPL BCP-MCNC: 4.1 G/DL (ref 3.5–5.2)
ALP SERPL-CCNC: 57 U/L (ref 55–135)
ALT SERPL W/O P-5'-P-CCNC: 34 U/L (ref 10–44)
ANION GAP SERPL CALC-SCNC: 7 MMOL/L (ref 8–16)
AST SERPL-CCNC: 25 U/L (ref 10–40)
BASOPHILS # BLD AUTO: 0.03 K/UL (ref 0–0.2)
BASOPHILS NFR BLD: 0.7 % (ref 0–1.9)
BILIRUB SERPL-MCNC: 0.3 MG/DL (ref 0.1–1)
BILIRUB UR QL STRIP: NEGATIVE
BUN SERPL-MCNC: 12 MG/DL (ref 6–20)
CALCIUM SERPL-MCNC: 8.9 MG/DL (ref 8.7–10.5)
CHLORIDE SERPL-SCNC: 104 MMOL/L (ref 95–110)
CK SERPL-CCNC: 201 U/L (ref 20–180)
CLARITY UR: CLEAR
CO2 SERPL-SCNC: 28 MMOL/L (ref 23–29)
COLOR UR: YELLOW
CREAT SERPL-MCNC: 0.9 MG/DL (ref 0.5–1.4)
DIFFERENTIAL METHOD: ABNORMAL
EOSINOPHIL # BLD AUTO: 0 K/UL (ref 0–0.5)
EOSINOPHIL NFR BLD: 0.7 % (ref 0–8)
ERYTHROCYTE [DISTWIDTH] IN BLOOD BY AUTOMATED COUNT: 16.2 % (ref 11.5–14.5)
EST. GFR  (AFRICAN AMERICAN): >60 ML/MIN/1.73 M^2
EST. GFR  (NON AFRICAN AMERICAN): >60 ML/MIN/1.73 M^2
GLUCOSE SERPL-MCNC: 96 MG/DL (ref 70–110)
GLUCOSE UR QL STRIP: NEGATIVE
HCT VFR BLD AUTO: 44.2 % (ref 37–48.5)
HGB BLD-MCNC: 12.9 G/DL (ref 12–16)
HGB UR QL STRIP: NEGATIVE
IMM GRANULOCYTES # BLD AUTO: 0.01 K/UL (ref 0–0.04)
IMM GRANULOCYTES NFR BLD AUTO: 0.2 % (ref 0–0.5)
KETONES UR QL STRIP: NEGATIVE
LEUKOCYTE ESTERASE UR QL STRIP: NEGATIVE
LYMPHOCYTES # BLD AUTO: 1.2 K/UL (ref 1–4.8)
LYMPHOCYTES NFR BLD: 27.8 % (ref 18–48)
MCH RBC QN AUTO: 19.9 PG (ref 27–31)
MCHC RBC AUTO-ENTMCNC: 29.2 G/DL (ref 32–36)
MCV RBC AUTO: 68 FL (ref 82–98)
MONOCYTES # BLD AUTO: 0.6 K/UL (ref 0.3–1)
MONOCYTES NFR BLD: 13.9 % (ref 4–15)
NEUTROPHILS # BLD AUTO: 2.5 K/UL (ref 1.8–7.7)
NEUTROPHILS NFR BLD: 56.7 % (ref 38–73)
NITRITE UR QL STRIP: NEGATIVE
NRBC BLD-RTO: 0 /100 WBC
PH UR STRIP: 6 [PH] (ref 5–8)
PLATELET # BLD AUTO: 225 K/UL (ref 150–350)
PMV BLD AUTO: 10.5 FL (ref 9.2–12.9)
POTASSIUM SERPL-SCNC: 3.6 MMOL/L (ref 3.5–5.1)
PROT SERPL-MCNC: 7.6 G/DL (ref 6–8.4)
PROT UR QL STRIP: NEGATIVE
RBC # BLD AUTO: 6.48 M/UL (ref 4–5.4)
SARS-COV-2 RDRP RESP QL NAA+PROBE: NEGATIVE
SODIUM SERPL-SCNC: 139 MMOL/L (ref 136–145)
SP GR UR STRIP: 1.02 (ref 1–1.03)
TROPONIN I SERPL DL<=0.01 NG/ML-MCNC: 0.01 NG/ML (ref 0–0.03)
URN SPEC COLLECT METH UR: NORMAL
UROBILINOGEN UR STRIP-ACNC: NEGATIVE EU/DL
WBC # BLD AUTO: 4.39 K/UL (ref 3.9–12.7)

## 2021-02-03 PROCEDURE — 93005 ELECTROCARDIOGRAM TRACING: CPT

## 2021-02-03 PROCEDURE — 25000003 PHARM REV CODE 250: Performed by: NURSE PRACTITIONER

## 2021-02-03 PROCEDURE — 63600175 PHARM REV CODE 636 W HCPCS: Performed by: NURSE PRACTITIONER

## 2021-02-03 PROCEDURE — 84484 ASSAY OF TROPONIN QUANT: CPT

## 2021-02-03 PROCEDURE — 81003 URINALYSIS AUTO W/O SCOPE: CPT

## 2021-02-03 PROCEDURE — U0002 COVID-19 LAB TEST NON-CDC: HCPCS

## 2021-02-03 PROCEDURE — 25000003 PHARM REV CODE 250: Performed by: EMERGENCY MEDICINE

## 2021-02-03 PROCEDURE — 99285 EMERGENCY DEPT VISIT HI MDM: CPT | Mod: 25

## 2021-02-03 PROCEDURE — 93010 EKG 12-LEAD: ICD-10-PCS | Mod: ,,, | Performed by: INTERNAL MEDICINE

## 2021-02-03 PROCEDURE — 80053 COMPREHEN METABOLIC PANEL: CPT

## 2021-02-03 PROCEDURE — 93010 ELECTROCARDIOGRAM REPORT: CPT | Mod: ,,, | Performed by: INTERNAL MEDICINE

## 2021-02-03 PROCEDURE — 85025 COMPLETE CBC W/AUTO DIFF WBC: CPT

## 2021-02-03 PROCEDURE — 99284 EMERGENCY DEPT VISIT MOD MDM: CPT | Mod: 25

## 2021-02-03 PROCEDURE — 36415 COLL VENOUS BLD VENIPUNCTURE: CPT

## 2021-02-03 PROCEDURE — 96374 THER/PROPH/DIAG INJ IV PUSH: CPT

## 2021-02-03 PROCEDURE — 82550 ASSAY OF CK (CPK): CPT

## 2021-02-03 RX ORDER — METHOCARBAMOL 500 MG/1
500 TABLET, FILM COATED ORAL
Status: DISCONTINUED | OUTPATIENT
Start: 2021-02-03 | End: 2021-02-03 | Stop reason: HOSPADM

## 2021-02-03 RX ORDER — KETOROLAC TROMETHAMINE 10 MG/1
10 TABLET, FILM COATED ORAL EVERY 6 HOURS PRN
Qty: 20 TABLET | Refills: 0 | Status: SHIPPED | OUTPATIENT
Start: 2021-02-03 | End: 2021-02-23

## 2021-02-03 RX ORDER — METHOCARBAMOL 500 MG/1
1000 TABLET, FILM COATED ORAL 2 TIMES DAILY PRN
Qty: 20 TABLET | Refills: 0 | Status: SHIPPED | OUTPATIENT
Start: 2021-02-03 | End: 2021-02-08

## 2021-02-03 RX ORDER — ACETAMINOPHEN 500 MG
1000 TABLET ORAL
Status: COMPLETED | OUTPATIENT
Start: 2021-02-03 | End: 2021-02-03

## 2021-02-03 RX ORDER — KETOROLAC TROMETHAMINE 30 MG/ML
15 INJECTION, SOLUTION INTRAMUSCULAR; INTRAVENOUS
Status: COMPLETED | OUTPATIENT
Start: 2021-02-03 | End: 2021-02-03

## 2021-02-03 RX ORDER — NAPROXEN 500 MG/1
500 TABLET ORAL 2 TIMES DAILY WITH MEALS
Qty: 14 TABLET | Refills: 0 | Status: SHIPPED | OUTPATIENT
Start: 2021-02-03 | End: 2021-02-10

## 2021-02-03 RX ADMIN — ACETAMINOPHEN 1000 MG: 500 TABLET ORAL at 09:02

## 2021-02-03 RX ADMIN — KETOROLAC TROMETHAMINE 15 MG: 30 INJECTION, SOLUTION INTRAMUSCULAR at 06:02

## 2021-02-03 RX ADMIN — SODIUM CHLORIDE 1000 ML: 0.9 INJECTION, SOLUTION INTRAVENOUS at 06:02

## 2021-04-29 ENCOUNTER — PATIENT MESSAGE (OUTPATIENT)
Dept: RESEARCH | Facility: HOSPITAL | Age: 61
End: 2021-04-29

## 2021-07-28 LAB — CRC RECOMMENDATION EXT: NORMAL

## 2021-10-18 ENCOUNTER — HOSPITAL ENCOUNTER (EMERGENCY)
Facility: HOSPITAL | Age: 61
Discharge: HOME | End: 2021-10-18
Attending: EMERGENCY MEDICINE
Payer: OTHER GOVERNMENT

## 2021-10-18 VITALS
RESPIRATION RATE: 17 BRPM | DIASTOLIC BLOOD PRESSURE: 90 MMHG | OXYGEN SATURATION: 99 % | HEIGHT: 67 IN | SYSTOLIC BLOOD PRESSURE: 172 MMHG | HEART RATE: 62 BPM | WEIGHT: 214 LBS | BODY MASS INDEX: 33.59 KG/M2

## 2021-10-18 DIAGNOSIS — R59.1 LYMPHADENOPATHY: Primary | ICD-10-CM

## 2021-10-18 PROCEDURE — 99281 EMR DPT VST MAYX REQ PHY/QHP: CPT

## 2021-12-16 ENCOUNTER — OFFICE VISIT (OUTPATIENT)
Dept: FAMILY MEDICINE | Facility: CLINIC | Age: 61
End: 2021-12-16
Payer: OTHER GOVERNMENT

## 2021-12-16 VITALS
WEIGHT: 217.38 LBS | SYSTOLIC BLOOD PRESSURE: 133 MMHG | HEART RATE: 73 BPM | OXYGEN SATURATION: 98 % | TEMPERATURE: 99 F | BODY MASS INDEX: 34.05 KG/M2 | RESPIRATION RATE: 16 BRPM | DIASTOLIC BLOOD PRESSURE: 88 MMHG

## 2021-12-16 DIAGNOSIS — I25.10 CORONARY ARTERY DISEASE, UNSPECIFIED VESSEL OR LESION TYPE, UNSPECIFIED WHETHER ANGINA PRESENT, UNSPECIFIED WHETHER NATIVE OR TRANSPLANTED HEART: Primary | ICD-10-CM

## 2021-12-16 DIAGNOSIS — R07.89 OTHER CHEST PAIN: ICD-10-CM

## 2021-12-16 DIAGNOSIS — E66.9 OBESITY, UNSPECIFIED CLASSIFICATION, UNSPECIFIED OBESITY TYPE, UNSPECIFIED WHETHER SERIOUS COMORBIDITY PRESENT: ICD-10-CM

## 2021-12-16 DIAGNOSIS — R73.9 HYPERGLYCEMIA: ICD-10-CM

## 2021-12-16 DIAGNOSIS — J32.0 CHRONIC MAXILLARY SINUSITIS: ICD-10-CM

## 2021-12-16 DIAGNOSIS — Z00.00 PREVENTATIVE HEALTH CARE: ICD-10-CM

## 2021-12-16 DIAGNOSIS — R59.1 LYMPHADENOPATHY: ICD-10-CM

## 2021-12-16 DIAGNOSIS — Q24.5 ANOMALOUS CORONARY ARTERY COMMUNICATION: ICD-10-CM

## 2021-12-16 PROCEDURE — 99214 OFFICE O/P EST MOD 30 MIN: CPT | Performed by: FAMILY MEDICINE

## 2021-12-16 PROCEDURE — 99204 OFFICE O/P NEW MOD 45 MIN: CPT | Mod: 25,S$PBB,, | Performed by: FAMILY MEDICINE

## 2021-12-16 PROCEDURE — 99204 PR OFFICE/OUTPT VISIT, NEW, LEVL IV, 45-59 MIN: ICD-10-PCS | Mod: 25,S$PBB,, | Performed by: FAMILY MEDICINE

## 2021-12-16 RX ORDER — AZITHROMYCIN 250 MG/1
250 TABLET, FILM COATED ORAL DAILY
Qty: 6 TABLET | Refills: 0 | Status: SHIPPED | OUTPATIENT
Start: 2021-12-16 | End: 2021-12-22

## 2021-12-16 RX ORDER — PROMETHAZINE HYDROCHLORIDE AND DEXTROMETHORPHAN HYDROBROMIDE 6.25; 15 MG/5ML; MG/5ML
10 SYRUP ORAL NIGHTLY
Qty: 180 ML | Refills: 2 | Status: SHIPPED | OUTPATIENT
Start: 2021-12-16 | End: 2021-12-26

## 2021-12-16 RX ORDER — IBUPROFEN 100 MG/5ML
1000 SUSPENSION, ORAL (FINAL DOSE FORM) ORAL DAILY
COMMUNITY

## 2022-01-17 ENCOUNTER — HOSPITAL ENCOUNTER (EMERGENCY)
Facility: HOSPITAL | Age: 62
Discharge: HOME | End: 2022-01-17
Attending: EMERGENCY MEDICINE
Payer: OTHER GOVERNMENT

## 2022-01-17 VITALS
HEIGHT: 68 IN | WEIGHT: 215 LBS | TEMPERATURE: 98 F | HEART RATE: 75 BPM | DIASTOLIC BLOOD PRESSURE: 75 MMHG | RESPIRATION RATE: 20 BRPM | OXYGEN SATURATION: 97 % | SYSTOLIC BLOOD PRESSURE: 161 MMHG | BODY MASS INDEX: 32.58 KG/M2

## 2022-01-17 DIAGNOSIS — M79.639 FOREARM PAIN: ICD-10-CM

## 2022-01-17 DIAGNOSIS — S46.912A: Primary | ICD-10-CM

## 2022-01-17 PROCEDURE — 99283 EMERGENCY DEPT VISIT LOW MDM: CPT | Mod: 25

## 2022-01-17 RX ORDER — DICLOFENAC SODIUM 50 MG/1
50 TABLET, DELAYED RELEASE ORAL 3 TIMES DAILY
Qty: 15 TABLET | Refills: 0 | Status: SHIPPED | OUTPATIENT
Start: 2022-01-17 | End: 2022-04-07

## 2022-01-17 NOTE — ED NOTES
D/C instructions and Rx in pt possession along with belongings. No other needs at this time. Pt ambulatory to ED Lobby without assistance, steady gait. AAOx4, ABC's intact, NADN.

## 2022-01-17 NOTE — FIRST PROVIDER EVALUATION
Emergency Department TeleTriage Encounter Note      CHIEF COMPLAINT    Chief Complaint   Patient presents with    Arm Injury     Left arm while walking dog        VITAL SIGNS   Initial Vitals [01/17/22 1358]   BP Pulse Resp Temp SpO2   (!) 161/75 75 20 97.8 °F (36.6 °C) 97 %      MAP       --            ALLERGIES    Review of patient's allergies indicates:   Allergen Reactions    Opioids - morphine analogues     Codeine Rash       PROVIDER TRIAGE NOTE  TeleTriage Note: Marie Salcido, a nontoxic/well appearing, 61 y.o. female, presented to the ED with c/o left arm pain after her dog pulled on her arm while she was walking her dog last week. Feels like a burning. Patient is able to move it without difficulty.     All ED beds are full at present; patient notified of this status.  Patient seen and medically screened by Nurse Practitioner via teletriage. Patient is stable to return to the waiting room and will be placed in an ED bed when available.  Care will be transferred to an alternate provider when patient has been placed in an Exam Room from the [facility] for physical exam, additional orders, and disposition.  2:03 PM Ivette Rajan DNP, FNP-C        ORDERS  Labs Reviewed - No data to display    ED Orders (720h ago, onward)    None            Virtual Visit Note: The provider triage portion of this emergency department evaluation and documentation was performed via [employer], a HIPAA-compliant telemedicine application, in concert with a tele-presenter in the room. A face to face patient evaluation with one of my colleagues will occur once the patient is placed in an emergency department room.      DISCLAIMER: This note was prepared with [employer] voice recognition transcription software. Garbled syntax, mangled pronouns, and other bizarre constructions may be attributed to that software system.

## 2022-01-17 NOTE — ED NOTES
Pt reports walking her large breed dog and was pulled abruptly while holding leash in her Left hand. Pt states she felt a pop and is having pain to area and unable to fully grasp with her left hand without severe pain. Pt is AAOx4, ABC's intact, NADN.

## 2022-01-17 NOTE — ED PROVIDER NOTES
Encounter Date: 1/17/2022    SCRIBE #1 NOTE: I, Gisele Perla, am scribing for, and in the presence of, Dong Plaza III, MD.       History     Chief Complaint   Patient presents with    Arm Injury     Left arm while walking dog      Time seen by provider: 2:31 PM on 01/17/2022    Marie Salcido is a 61 y.o. female who presents to the ED with an onset of left arm pain. Patient was walking her dog with leash in her left hand. As her dog took off and started running, she felt a pop in her left arm. She has since been experiencing pain with burning sensation along her left arm. The patient denies any other symptoms at this time. PMHx of HTN, HLD, CAD, silent myocardial infarction. PSHx of CABG.    The history is provided by the patient.     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    cyst removed from right breast      LASER LAPAROSCOPY      TUBAL LIGATION       Family History   Problem Relation Age of Onset    Cancer Maternal Grandmother     Breast cancer Sister     Asthma Sister     Arrhythmia Mother     Diabetes Mellitus Neg Hx      Social History     Tobacco Use    Smoking status: Current Every Day Smoker    Smokeless tobacco: Never Used   Substance Use Topics    Alcohol use: Yes     Alcohol/week: 1.0 standard drink     Types: 1 Glasses of wine per week    Drug use: No     Review of Systems   Constitutional: Negative for fever.   Respiratory: Negative for shortness of breath.    Genitourinary: Negative for flank  pain.   Musculoskeletal: Positive for myalgias (LUE). Negative for gait problem.   Neurological: Negative for weakness.   Psychiatric/Behavioral: Negative for confusion.       Physical Exam     Initial Vitals [01/17/22 1358]   BP Pulse Resp Temp SpO2   (!) 161/75 75 20 97.8 °F (36.6 °C) 97 %      MAP       --         Physical Exam    Nursing note and vitals reviewed.  Constitutional: She appears well-developed and well-nourished.   HENT:   Head: Normocephalic and atraumatic.   Eyes: Conjunctivae are normal.   Neck: Neck supple.   Normal range of motion.  Cardiovascular: Normal rate, regular rhythm and normal heart sounds. Exam reveals no gallop and no friction rub.    No murmur heard.  Pulmonary/Chest: Effort normal and breath sounds normal. No respiratory distress. She has no wheezes. She has no rhonchi. She has no rales.   Abdominal: Abdomen is soft. She exhibits no distension. There is no abdominal tenderness.   Musculoskeletal:         General: Normal range of motion.      Cervical back: Normal range of motion and neck supple.      Comments: No tenderness of left forearm. No swelling. Normal strength to left upper extremity.     Neurological: She is alert and oriented to person, place, and time.   Skin: Skin is warm and dry. No erythema.   Psychiatric: She has a normal mood and affect.         ED Course   Procedures  Labs Reviewed - No data to display       Imaging Results          X-Ray Forearm Left (In process)                  Medications - No data to display  Medical Decision Making:   History:   Old Medical Records: I decided to obtain old medical records.  Clinical Tests:   Radiological Study: Ordered and Reviewed  ED Management:  61-year-old female presents with left forearm pain.  X-rays failed to demonstrate any evidence of fracture.  Functional exam fails to demonstrate any definitive evidence of tendon tear.  She is placed in a sling and referred to Orthopedic surgery.       APC / Resident Notes:   I,  Dr. Dong Plaza III, personally performed the services described in this documentation. All medical record entries made by the scribe were at my direction and in my presence.  I have reviewed the chart and agree that the record reflects my personal performance and is accurate and complete     Scribe Attestation:   Scribe #1: I performed the above scribed service and the documentation accurately describes the services I performed. I attest to the accuracy of the note.                 Clinical Impression:   Final diagnoses:  [M79.639] Forearm pain                 Dong Plaza III, MD  01/17/22 8246

## 2022-02-10 DIAGNOSIS — G89.29 CHRONIC LEFT SHOULDER PAIN: Primary | ICD-10-CM

## 2022-02-10 DIAGNOSIS — M25.512 CHRONIC LEFT SHOULDER PAIN: Primary | ICD-10-CM

## 2022-02-11 ENCOUNTER — HOSPITAL ENCOUNTER (OUTPATIENT)
Dept: RADIOLOGY | Facility: HOSPITAL | Age: 62
Discharge: HOME | End: 2022-02-11
Attending: ORTHOPAEDIC SURGERY
Payer: OTHER GOVERNMENT

## 2022-02-11 ENCOUNTER — OFFICE VISIT (OUTPATIENT)
Dept: ORTHOPEDICS | Facility: CLINIC | Age: 62
End: 2022-02-11
Payer: OTHER GOVERNMENT

## 2022-02-11 VITALS — HEIGHT: 68 IN | BODY MASS INDEX: 32.58 KG/M2 | WEIGHT: 214.94 LBS

## 2022-02-11 DIAGNOSIS — S56.912A STRAIN OF LEFT FOREARM, INITIAL ENCOUNTER: Primary | ICD-10-CM

## 2022-02-11 DIAGNOSIS — G89.29 CHRONIC LEFT SHOULDER PAIN: ICD-10-CM

## 2022-02-11 DIAGNOSIS — M25.522 LEFT ELBOW PAIN: Primary | ICD-10-CM

## 2022-02-11 DIAGNOSIS — M25.522 LEFT ELBOW PAIN: ICD-10-CM

## 2022-02-11 DIAGNOSIS — M25.512 CHRONIC LEFT SHOULDER PAIN: ICD-10-CM

## 2022-02-11 DIAGNOSIS — S46.912A STRAIN OF LEFT SHOULDER, INITIAL ENCOUNTER: ICD-10-CM

## 2022-02-11 PROCEDURE — 73080 X-RAY EXAM OF ELBOW: CPT | Mod: 26,LT,, | Performed by: RADIOLOGY

## 2022-02-11 PROCEDURE — 99213 PR OFFICE/OUTPT VISIT, EST, LEVL III, 20-29 MIN: ICD-10-PCS | Mod: S$PBB,,, | Performed by: ORTHOPAEDIC SURGERY

## 2022-02-11 PROCEDURE — 99213 OFFICE O/P EST LOW 20 MIN: CPT | Mod: S$PBB,,, | Performed by: ORTHOPAEDIC SURGERY

## 2022-02-11 PROCEDURE — 73030 X-RAY EXAM OF SHOULDER: CPT | Mod: TC,PN,LT

## 2022-02-11 PROCEDURE — 99999 PR PBB SHADOW E&M-EST. PATIENT-LVL III: ICD-10-PCS | Mod: PBBFAC,,, | Performed by: ORTHOPAEDIC SURGERY

## 2022-02-11 PROCEDURE — 73080 X-RAY EXAM OF ELBOW: CPT | Mod: TC,PN,LT

## 2022-02-11 PROCEDURE — 99999 PR PBB SHADOW E&M-EST. PATIENT-LVL III: CPT | Mod: PBBFAC,,, | Performed by: ORTHOPAEDIC SURGERY

## 2022-02-11 PROCEDURE — 73080 XR ELBOW COMPLETE 3 VIEW LEFT: ICD-10-PCS | Mod: 26,LT,, | Performed by: RADIOLOGY

## 2022-02-11 PROCEDURE — 99213 OFFICE O/P EST LOW 20 MIN: CPT | Mod: PBBFAC,PN | Performed by: ORTHOPAEDIC SURGERY

## 2022-02-11 PROCEDURE — 73030 XR SHOULDER COMPLETE 2 OR MORE VIEWS LEFT: ICD-10-PCS | Mod: 26,LT,, | Performed by: RADIOLOGY

## 2022-02-11 PROCEDURE — 73030 X-RAY EXAM OF SHOULDER: CPT | Mod: 26,LT,, | Performed by: RADIOLOGY

## 2022-02-11 NOTE — PROGRESS NOTES
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 [facility]ino [facility]    cyst removed from right breast      LASER LAPAROSCOPY      TUBAL LIGATION           Current Outpatient Medications:     ascorbic acid, vitamin C, (VITAMIN C) 1000 MG tablet, Take 1,000 mg by mouth once daily., Disp: , Rfl:     aspirin (ECOTRIN) 81 MG EC tablet, Take 81 mg by mouth once daily., Disp: , Rfl:     diclofenac (VOLTAREN) 50 MG EC tablet, Take 1 tablet (50 mg total) by mouth 3 (three) times daily., Disp: 15 tablet, Rfl: 0    fish oil-omega-3 fatty acids 300-1,000 mg capsule, Take by mouth once daily., Disp: , Rfl:     atorvastatin (LIPITOR) 40 MG tablet, Take 1 tablet (40 mg total) by mouth once daily., Disp: 30 tablet, Rfl: 0    Allergies as of 02/11/2022 - Reviewed 02/11/2022   Allergen Reaction Noted    Opioids - morphine analogues  02/03/2021    Codeine Rash 07/21/2012       Family History   Problem Relation Age of Onset    Cancer Maternal Grandmother     Breast cancer Sister     Asthma Sister     Arrhythmia Mother     Diabetes Mellitus Neg Hx        Social History     Socioeconomic History    Marital status:    Tobacco Use    Smoking status: Current Every Day Smoker    Smokeless tobacco: Never Used   Substance and Sexual Activity    Alcohol use: Yes     Alcohol/week: 1.0 standard drink     Types: 1 Glasses of wine per week    Drug use: No    Sexual activity: Yes     Partners: Male             HPI:  Patient is being seen today for left elbow pain date of injury was 01/08/2022 5 weeks ago she was walking her dog the dog went to tad another  dog and she felt a pop in her left forearm pain radiating into her mid arm and up to her shoulder also down into her hand      Review of Systems   Constitution: Negative for chills and fever.   HENT: Negative for headaches and blurry vision.   Cardiovascular: Negative for chest pain, irregular heartbeat, leg swelling and palpitations.   Respiratory: Negative for cough and shortness of breath.   Endocrine: Negative for polyuria.   Hematologic/Lymphatic: Negative for bleeding problem. Does not bruise/bleed easily.   Skin: Negative for poor wound healing and rash.   Musculoskeletal: Negative for joint pain. Negative for arthritis, joint swelling, muscle weakness and myalgias.   Gastrointestinal: Negative for abdominal pain, heartburn, melena, nausea and vomiting.   Genitourinary: Negative for bladder incontinence and dysuria.   Neurological: Negative for numbness.   Psychiatric/Behavioral: Negative for depression. The patient is not nervous/anxious.     PE:  [unfilled]    A&Ox3, NAD  Neck-patient has excellent range of motion of the neck no neck pain elicited deep tendon reflexes intact and symmetrical in both upper extremities  RUE no swelling deformity  LUE patient has good range of motion of the left shoulder she does exhibit some weakness in abduction strength some tenderness in the lateral deltoid area no crepitance on range of motion patient pain appears to be over the radial side of the forearm his no palpable defects patient has good range of motion of the elbow joint does no swelling or deformity patient has no weakness on biceps strength or triceps strength patient has some slight weakness on supination of the wrist patient has full range of motion of the left elbow  Pelvis no pelvic pain  Back no back pain straight leg raise negative  RLE no swelling or deformity  LLE no swelling deformity    Xrays:  X-rays of the left shoulder and left elbow were both negative        Labs:    No results found for this or  any previous visit (from the past 24 hour(s)).    Assessment/Plan:   Left forearm strain.  Patient is presently on ibuprofen I am going to send her to physical therapy for active assisted range of motion of her left shoulder left elbow

## 2022-02-17 ENCOUNTER — CLINICAL SUPPORT (OUTPATIENT)
Dept: REHABILITATION | Facility: HOSPITAL | Age: 62
End: 2022-02-17
Attending: ORTHOPAEDIC SURGERY
Payer: OTHER GOVERNMENT

## 2022-02-17 DIAGNOSIS — M79.632 LEFT FOREARM PAIN: ICD-10-CM

## 2022-02-17 DIAGNOSIS — S46.912A STRAIN OF LEFT SHOULDER, INITIAL ENCOUNTER: ICD-10-CM

## 2022-02-17 DIAGNOSIS — S56.912A STRAIN OF LEFT FOREARM, INITIAL ENCOUNTER: ICD-10-CM

## 2022-02-17 PROCEDURE — 97165 OT EVAL LOW COMPLEX 30 MIN: CPT | Mod: PN

## 2022-02-17 PROCEDURE — 97110 THERAPEUTIC EXERCISES: CPT | Mod: PN

## 2022-02-17 NOTE — PLAN OF CARE
Occupational Therapy [facility]  Initial Evaluation     Date: 2/17/2022  Name: Marie Salcido  Clinic Number: [id number]    Therapy Diagnosis: S56.912A (ICD-10-CM) - Strain of left forearm,S46.912A (ICD-10-CM) - Strain of left shoulder  Encounter Diagnosis   Name Primary?    Left forearm pain      Physician: Petar Jackson MD    Physician Orders: S56.912A (ICD-10-CM) - Strain of left forearm,S46.912A (ICD-10-CM) - Strain of left shoulder; evaluate and treat; AAROM of (L) forearm and shoulder  Surgical Procedure and Date: na  Dominant Side: right  Date of Onset: 01/08/2022   5 weeks, 5 days  History / Mechanism of Injury:  Patient went to ED on 01/17/2022 for left elbow pain.  Pt. Was walking dog on 01/08/2022  when her dog  went to tad another dog and she felt a pop in her left forearm pain radiating into her mid arm and up to her shoulder also down into her hand  Previous Therapy: none    Environmental Concerns/Fall Risk: None  Language: None  Cultural/Spiritual: None  Abuse/Neglect/Nutritional: None  Imaging / Tests: See Epic    Past Medical History/Physical Systems Review:    has a past medical history of Anemia, Coronary artery disease, Hyperlipidemia, Hypertension, Silent myocardial infarction, and Sleep apnea.     has a past surgical history that includes cyst removed from right breast; Tubal ligation; Laser laparoscopy; Coronary artery bypass graft (10/4/13); and Breast cyst aspiration.    has a current medication list which includes the following prescription(s): ascorbic acid (vitamin c), aspirin, atorvastatin, diclofenac, and fish oil-omega-3 fatty acids.    Review of patient's allergies indicates:   Allergen Reactions    Opioids - morphine analogues     Codeine Rash        Insurance Authorization Period Expiration: 02/11/2022-02/11/2023  Plan of Care Certification Period: 02/17/2022-05/17/2022  Date of Return to MD: 03/22/2022    Occupation:   of a beauty salon   Working presently: yes  Workers  "Compensation:  no      Visit # / Visits authorized:   Time In: 3:00  Time Out: 3:45  Total Billable Time: 45 minutes        Precautions:  Pt. Reports, "no known CA, no pacemaker, no allergies to latex or adhesives."     Subjective     Pt. Reports, "I was walking my dog on 2022 when my dog took off and ran after another dog.  I felt a pop in my (L) forearm with shocking pain and started burning.  I went to the ER and they gave me a sling but I only wore it for two days.  The pain radiates up to my shoulder and down to the back of my hand.  I have trouble picking up items.  I do the majority of things with my right arm.  I injured my (L) shoulder awhile ago.  I think my shoulder is hurting because I am compensating because my forearm is hurting.  I have the most pain I am at work shampooing hair, while cooking and holding a pot while pouring out.  I have the most trouble with my forearm than my shoulder.  I can not open chip bags or packets but before I could."    Pain   At rest: 5/10  With work/ Activity:  7/10  Sleepin/10  Location of Pain: (L) forearm  //  shoulder    Patient's Goals for Therapy: strengthen my arm    Objective     Sensation: grossly intact  Scar / Wound: na                                     AROM: elbow/wrist          (R)   //  (L)  Elbow Ext/Flex:  0/134  //  0 / 144  DF/VF:  60 / 80  //  45 / 70  RD/UD: 20 / 35  // 15 / 25  SUP/PRON:  80 / 90  / 65  / 75      Manual Muscle Test:  Elbow / Wrist        (L)        Elbow Flexion:      5/5  Elbow Extension:  5/5  Dorsi Flexion:  3+/5  Volar Flexion:  3+/5  Radial Deviation: 3+/5  Ulnar Deviation: 3+/5        AROM: Shoulder  (measured in standing)   (R)  /  (L)  FF: WFL  ABD:  WFL  EXT:  WFL  ER:  WFL  IR:  WFL      Manual Muscle Test:  Shoulder                 (L)  FF: 5/5  ABD: 5/5  EXT:  5/5  ER: 5/5  IR:  5/5       Strength: (VENKAT Dynamometer in lbs.),Position II  Elbow FLEX  (R): 50   (L):  35    Elbow EXT  (R):  45  (L):  20  "     Pinch Strength: (Pinch Gauge in lbs)        (R) /  (L)  LAT:  15  / 10  3PT: 15  / 11  2PT:  13 / 6      FOTO SCORE: 53      Treatment Included:   OT evaluation and instruction in written HEP including 1-2# DF/VF and RD/UD x 20 reps x 2 times per day.  Pt. Educated on modalities for home pain management, activity modifications and precautions.  Pt. Demo understanding of above.     Patient/Family Education: role of OT, goals for OT, scheduling/cancellations - pt verbalized understanding. Discussed insurance limitations with patient.        Assessment:     Problem List :       Decreased ROM,  Decreased  and pinch strength,   Decreased muscle strength,    Decreased functional abilities,  Increased pain,       During the evaluation, the patient required Minimal modification or assistance.  The following co-morbid conditions/personal factors may affect the plan of care: none .        Marie Salcido is a 61 y.o. female referred to outpatient occupational therapy and presents with a medical diagnosis of S56.912A (ICD-10-CM) - Strain of left forearm,S46.912A (ICD-10-CM) - Strain of left shoulder , resulting in limited ROM, strength, functional abilities, increased pain and inflammation and demonstrates limitations as described in the chart above. Following medical record review it is determined that pt will benefit from occupational therapy services in order to maximize pain free and/or functional use of (L) upper extremity. The following goals were discussed with the patient and patient is in agreement with them as to be addressed in the treatment plan. The patient's rehab potential is good.    Anticipated Barriers to Therapy: None     The following goals were discussed with the patient and patient is in agreement with them as to be addressed in the treatment plan.     Goals:   Goals:  1)   Pt. to be IND with HEP and modalities for pain management by 1 week.   2)   Pt. will report   5/10 pain on average with  activity to A with exercises by 6-8 weeks.  3)   Pt. will increase ROM 3-5 degrees to increase functional use for ADL's by 6-8 weeks.  4)   Pt. will increase MMT by a grade to A with lifting items by 6-8 weeks.   5)   Pt. will increase  strength 3-5 lbs. to open containers by 6-8 weeks.    6)   Pt. will increase pinch 1-3 lbs for buttoning by 6-8 weeks.          Plan:   Patient to be treated by Occupational Therapy    1-2    times per week for     90 days   during the certification period from  02/17/2022  to 05/17/2022     to achieve the established goals.  Treatment to include :  paraffin, fluidotherapy, manual therapy/joint mobilizations, kinesiotaping, dry needling performed by certified physical therapist,   modalities for pain management, US 1.0 /3.3mhz, therapeutic exercises/activities,        strengthening,    as well as any other treatments deemed necessary based on the patient's needs or progress.     Will reassess as needed and adjust treatment plan accordingly.              I certify the need for these services furnished under this plan of treatment and while under my care    ____________________________________                         __________________  Physician/Referring Practitioner                                               Date of Signature    Arabella Moran OT

## 2022-02-17 NOTE — PROGRESS NOTES
Pt. Evaluated and POC established.  Please sign and return if in agreement.    Thank you,  JEREMIE Colin

## 2022-03-17 ENCOUNTER — DOCUMENTATION ONLY (OUTPATIENT)
Dept: REHABILITATION | Facility: HOSPITAL | Age: 62
End: 2022-03-17
Payer: OTHER GOVERNMENT

## 2022-03-17 NOTE — PROGRESS NOTES
OT contacted patient on 03/17/2022 to follow up regarding insurance authorization.  Pt. Stated that she has not received the PCM letter and she will check on it.  Pt's therapy appointment on 03/17/2022 will be canceled 2* to pending authorization.  Pt. Will contact therapy clinic when PCM letter obtained.

## 2022-04-07 ENCOUNTER — OFFICE VISIT (OUTPATIENT)
Dept: URGENT CARE | Facility: CLINIC | Age: 62
End: 2022-04-07
Payer: OTHER GOVERNMENT

## 2022-04-07 VITALS
BODY MASS INDEX: 32.74 KG/M2 | TEMPERATURE: 100 F | SYSTOLIC BLOOD PRESSURE: 131 MMHG | WEIGHT: 216 LBS | OXYGEN SATURATION: 98 % | HEART RATE: 102 BPM | HEIGHT: 68 IN | DIASTOLIC BLOOD PRESSURE: 81 MMHG | RESPIRATION RATE: 16 BRPM

## 2022-04-07 DIAGNOSIS — H66.92 LEFT OTITIS MEDIA, UNSPECIFIED OTITIS MEDIA TYPE: Primary | ICD-10-CM

## 2022-04-07 DIAGNOSIS — H60.92 OTITIS EXTERNA OF LEFT EAR, UNSPECIFIED CHRONICITY, UNSPECIFIED TYPE: ICD-10-CM

## 2022-04-07 PROCEDURE — 96372 THER/PROPH/DIAG INJ SC/IM: CPT | Mod: S$GLB,,,

## 2022-04-07 PROCEDURE — 99214 PR OFFICE/OUTPT VISIT, EST, LEVL IV, 30-39 MIN: ICD-10-PCS | Mod: 25,S$GLB,,

## 2022-04-07 PROCEDURE — 96372 PR INJECTION,THERAP/PROPH/DIAG2ST, IM OR SUBCUT: ICD-10-PCS | Mod: S$GLB,,,

## 2022-04-07 PROCEDURE — 99214 OFFICE O/P EST MOD 30 MIN: CPT | Mod: 25,S$GLB,,

## 2022-04-07 RX ORDER — OFLOXACIN 3 MG/ML
5 SOLUTION AURICULAR (OTIC) DAILY
Qty: 5 ML | Refills: 0 | Status: SHIPPED | OUTPATIENT
Start: 2022-04-07 | End: 2022-07-27

## 2022-04-07 RX ORDER — CEFTRIAXONE 1 G/1
1 INJECTION, POWDER, FOR SOLUTION INTRAMUSCULAR; INTRAVENOUS
Status: COMPLETED | OUTPATIENT
Start: 2022-04-07 | End: 2022-04-07

## 2022-04-07 RX ORDER — AMOXICILLIN AND CLAVULANATE POTASSIUM 875; 125 MG/1; MG/1
1 TABLET, FILM COATED ORAL EVERY 12 HOURS
Qty: 14 TABLET | Refills: 0 | Status: SHIPPED | OUTPATIENT
Start: 2022-04-07 | End: 2022-04-11 | Stop reason: ALTCHOICE

## 2022-04-07 RX ADMIN — CEFTRIAXONE 1 G: 1 INJECTION, POWDER, FOR SOLUTION INTRAMUSCULAR; INTRAVENOUS at 04:04

## 2022-04-07 NOTE — PROGRESS NOTES
"Subjective:       Patient ID: Marie Salcido is a 61 y.o. female.    Vitals:  height is 5' 7.5" (1.715 m) and weight is 98 kg (216 lb). Her oral temperature is 99.8 °F (37.7 °C). Her blood pressure is 131/81 and her pulse is 102. Her respiration is 16 and oxygen saturation is 98%.     Chief Complaint: Otalgia    Otalgia   There is pain in the left ear. This is a new problem. The current episode started yesterday. The problem has been rapidly worsening. The pain is at a severity of 10/10. Pertinent negatives include no abdominal pain, coughing, hearing loss, neck pain or sore throat. She has tried NSAIDs for the symptoms. The treatment provided no relief.       Constitution: Negative for activity change, appetite change, chills, sweating and fever.   HENT: Positive for ear pain. Negative for hearing loss, sinus pain, sinus pressure and sore throat.    Neck: Negative for neck pain.   Cardiovascular: Negative for chest pain.   Eyes: Negative for blurred vision.   Respiratory: Negative for chest tightness, cough and shortness of breath.    Gastrointestinal: Negative for abdominal pain.   Neurological: Negative for dizziness and history of vertigo.       Objective:      Physical Exam   Constitutional: She is oriented to person, place, and time.  Non-toxic appearance. She does not appear ill. No distress.   HENT:   Head: Normocephalic.   Ears:   Right Ear: Hearing, tympanic membrane, external ear and ear canal normal.   Left Ear: There is swelling and tenderness. Tympanic membrane is injected and erythematous. Tympanic membrane is not perforated.   Nose: Nose normal.   Mouth/Throat: Mucous membranes are dry. No oropharyngeal exudate or posterior oropharyngeal erythema. Oropharynx is clear.   Eyes: Conjunctivae are normal.      extraocular movement intact   Cardiovascular: Normal rate, normal heart sounds and normal pulses.   Pulmonary/Chest: Effort normal and breath sounds normal.   Abdominal: Normal appearance. "   Neurological: no focal deficit. She is alert and oriented to person, place, and time.   Skin: Skin is not diaphoretic. Capillary refill takes 2 to 3 seconds.   Psychiatric: Mood normal.   Vitals reviewed.        Assessment:       1. Left otitis media, unspecified otitis media type    2. Otitis externa of left ear, unspecified chronicity, unspecified type          Plan:         Left otitis media, unspecified otitis media type  -     amoxicillin-clavulanate 875-125mg (AUGMENTIN) 875-125 mg per tablet; Take 1 tablet by mouth every 12 (twelve) hours. for 7 days  Dispense: 14 tablet; Refill: 0    Otitis externa of left ear, unspecified chronicity, unspecified type  -     ofloxacin (FLOXIN) 0.3 % otic solution; Place 5 drops into the left ear once daily.  Dispense: 5 mL; Refill: 0         Patient presents with moderate left sided ear pain, states she gets recurrent ear infections and normally has to get an antibiotic shot and oral antibiotics. Patient given 1gram rocephin in clinic and will do oral abx. Instructed to FU with ENT if symptoms do not resolve.

## 2022-04-07 NOTE — PATIENT INSTRUCTIONS
Follow up with your ENT in 3-5 days if symptoms do not improve.     Take medications with food. Get over counter probiotic.

## 2022-04-11 RX ORDER — AMOXICILLIN 875 MG/1
875 TABLET, FILM COATED ORAL EVERY 12 HOURS
Qty: 10 TABLET | Refills: 0 | Status: SHIPPED | OUTPATIENT
Start: 2022-04-11 | End: 2022-04-16

## 2022-04-13 ENCOUNTER — TELEPHONE (OUTPATIENT)
Dept: FAMILY MEDICINE | Facility: CLINIC | Age: 62
End: 2022-04-13

## 2022-04-13 DIAGNOSIS — Z01.419 WELL WOMAN EXAM: Primary | ICD-10-CM

## 2022-04-14 ENCOUNTER — TELEPHONE (OUTPATIENT)
Dept: FAMILY MEDICINE | Facility: CLINIC | Age: 62
End: 2022-04-14

## 2022-04-14 DIAGNOSIS — Z01.419 WELL WOMAN EXAM: Primary | ICD-10-CM

## 2022-04-14 NOTE — TELEPHONE ENCOUNTER
"----- Message from Stephymary Latham sent at 4/14/2022  9:50 AM CDT -----  Patient states the referral that was sent was supposed to be sent to Dr. Spicer instead of Dr. Bell. Please re-send referral. The office also told her it was still in "pend" status. Is there any way to fix this?      "

## 2022-05-19 LAB
ALBUMIN SERPL-MCNC: 4 G/DL (ref 3.6–5.1)
ALBUMIN/GLOB SERPL: 1.4 (CALC) (ref 1–2.5)
ALP SERPL-CCNC: 42 U/L (ref 37–153)
ALT SERPL-CCNC: 17 U/L (ref 6–29)
AST SERPL-CCNC: 18 U/L (ref 10–35)
BILIRUB SERPL-MCNC: 0.4 MG/DL (ref 0.2–1.2)
BUN SERPL-MCNC: 11 MG/DL (ref 7–25)
BUN/CREAT SERPL: NORMAL (CALC) (ref 6–22)
CALCIUM SERPL-MCNC: 8.9 MG/DL (ref 8.6–10.4)
CHLORIDE SERPL-SCNC: 104 MMOL/L (ref 98–110)
CHOLEST SERPL-MCNC: 160 MG/DL
CHOLEST/HDLC SERPL: 2.8 (CALC)
CO2 SERPL-SCNC: 30 MMOL/L (ref 20–32)
CREAT SERPL-MCNC: 0.79 MG/DL (ref 0.5–1.05)
GLOBULIN SER CALC-MCNC: 2.8 G/DL (CALC) (ref 1.9–3.7)
GLUCOSE SERPL-MCNC: 94 MG/DL (ref 65–99)
HCV AB S/CO SERPL IA: 0.02
HCV AB SERPL QL IA: NORMAL
HDLC SERPL-MCNC: 57 MG/DL
HIV 1+2 AB+HIV1 P24 AG SERPL QL IA: NORMAL
LDLC SERPL CALC-MCNC: 82 MG/DL (CALC)
NONHDLC SERPL-MCNC: 103 MG/DL (CALC)
POTASSIUM SERPL-SCNC: 4.4 MMOL/L (ref 3.5–5.3)
PROT SERPL-MCNC: 6.8 G/DL (ref 6.1–8.1)
SODIUM SERPL-SCNC: 140 MMOL/L (ref 135–146)
TRIGL SERPL-MCNC: 117 MG/DL

## 2022-05-23 ENCOUNTER — HOSPITAL ENCOUNTER (OUTPATIENT)
Facility: HOSPITAL | Age: 62
Discharge: HOME | End: 2022-05-24
Attending: EMERGENCY MEDICINE | Admitting: STUDENT IN AN ORGANIZED HEALTH CARE EDUCATION/TRAINING PROGRAM
Payer: OTHER GOVERNMENT

## 2022-05-23 DIAGNOSIS — I25.10 CAD (CORONARY ARTERY DISEASE): ICD-10-CM

## 2022-05-23 DIAGNOSIS — R07.9 CHEST PAIN: ICD-10-CM

## 2022-05-23 DIAGNOSIS — Z95.1 S/P CABG X 4: ICD-10-CM

## 2022-05-23 PROBLEM — R03.0 ELEVATED BLOOD PRESSURE READING: Status: ACTIVE | Noted: 2022-05-23

## 2022-05-23 LAB
ALBUMIN SERPL BCP-MCNC: 4 G/DL (ref 3.5–5.2)
ALP SERPL-CCNC: 43 U/L (ref 55–135)
ALT SERPL W/O P-5'-P-CCNC: 23 U/L (ref 10–44)
ANION GAP SERPL CALC-SCNC: 7 MMOL/L (ref 8–16)
AST SERPL-CCNC: 20 U/L (ref 10–40)
BASOPHILS # BLD AUTO: 0.02 K/UL (ref 0–0.2)
BASOPHILS NFR BLD: 0.5 % (ref 0–1.9)
BILIRUB SERPL-MCNC: 0.7 MG/DL (ref 0.1–1)
BNP SERPL-MCNC: 25 PG/ML (ref 0–99)
BUN SERPL-MCNC: 10 MG/DL (ref 6–20)
CALCIUM SERPL-MCNC: 8.5 MG/DL (ref 8.7–10.5)
CHLORIDE SERPL-SCNC: 104 MMOL/L (ref 95–110)
CO2 SERPL-SCNC: 26 MMOL/L (ref 23–29)
CREAT SERPL-MCNC: 0.6 MG/DL (ref 0.5–1.4)
D DIMER PPP IA.FEU-MCNC: <0.27 UG/ML FEU
DIFFERENTIAL METHOD: ABNORMAL
EOSINOPHIL # BLD AUTO: 0.1 K/UL (ref 0–0.5)
EOSINOPHIL NFR BLD: 1.2 % (ref 0–8)
ERYTHROCYTE [DISTWIDTH] IN BLOOD BY AUTOMATED COUNT: 16 % (ref 11.5–14.5)
EST. GFR  (AFRICAN AMERICAN): >60 ML/MIN/1.73 M^2
EST. GFR  (NON AFRICAN AMERICAN): >60 ML/MIN/1.73 M^2
GLUCOSE SERPL-MCNC: 103 MG/DL (ref 70–110)
HCT VFR BLD AUTO: 42.8 % (ref 37–48.5)
HGB BLD-MCNC: 12.7 G/DL (ref 12–16)
IMM GRANULOCYTES # BLD AUTO: 0.01 K/UL (ref 0–0.04)
IMM GRANULOCYTES NFR BLD AUTO: 0.2 % (ref 0–0.5)
INR PPP: 1.3
LYMPHOCYTES # BLD AUTO: 1.3 K/UL (ref 1–4.8)
LYMPHOCYTES NFR BLD: 32.8 % (ref 18–48)
MCH RBC QN AUTO: 20 PG (ref 27–31)
MCHC RBC AUTO-ENTMCNC: 29.7 G/DL (ref 32–36)
MCV RBC AUTO: 68 FL (ref 82–98)
MONOCYTES # BLD AUTO: 0.3 K/UL (ref 0.3–1)
MONOCYTES NFR BLD: 8.2 % (ref 4–15)
NEUTROPHILS # BLD AUTO: 2.3 K/UL (ref 1.8–7.7)
NEUTROPHILS NFR BLD: 57.1 % (ref 38–73)
NRBC BLD-RTO: 0 /100 WBC
PLATELET # BLD AUTO: 281 K/UL (ref 150–450)
PMV BLD AUTO: 11 FL (ref 9.2–12.9)
POTASSIUM SERPL-SCNC: 4.2 MMOL/L (ref 3.5–5.1)
PROT SERPL-MCNC: 7.3 G/DL (ref 6–8.4)
PROTHROMBIN TIME: 15.4 SEC (ref 11.4–13.7)
RBC # BLD AUTO: 6.34 M/UL (ref 4–5.4)
SARS-COV-2 RDRP RESP QL NAA+PROBE: NEGATIVE
SODIUM SERPL-SCNC: 137 MMOL/L (ref 136–145)
TROPONIN I SERPL DL<=0.01 NG/ML-MCNC: <0.03 NG/ML
WBC # BLD AUTO: 4.02 K/UL (ref 3.9–12.7)

## 2022-05-23 PROCEDURE — 80053 COMPREHEN METABOLIC PANEL: CPT | Performed by: EMERGENCY MEDICINE

## 2022-05-23 PROCEDURE — 84484 ASSAY OF TROPONIN QUANT: CPT | Performed by: EMERGENCY MEDICINE

## 2022-05-23 PROCEDURE — 83880 ASSAY OF NATRIURETIC PEPTIDE: CPT | Performed by: EMERGENCY MEDICINE

## 2022-05-23 PROCEDURE — G0378 HOSPITAL OBSERVATION PER HR: HCPCS

## 2022-05-23 PROCEDURE — U0002 COVID-19 LAB TEST NON-CDC: HCPCS | Performed by: EMERGENCY MEDICINE

## 2022-05-23 PROCEDURE — 93010 EKG 12-LEAD: ICD-10-PCS | Mod: ,,, | Performed by: SPECIALIST

## 2022-05-23 PROCEDURE — 93005 ELECTROCARDIOGRAM TRACING: CPT | Performed by: SPECIALIST

## 2022-05-23 PROCEDURE — 85025 COMPLETE CBC W/AUTO DIFF WBC: CPT | Performed by: EMERGENCY MEDICINE

## 2022-05-23 PROCEDURE — 85379 FIBRIN DEGRADATION QUANT: CPT | Performed by: EMERGENCY MEDICINE

## 2022-05-23 PROCEDURE — 93010 ELECTROCARDIOGRAM REPORT: CPT | Mod: ,,, | Performed by: SPECIALIST

## 2022-05-23 PROCEDURE — 99285 EMERGENCY DEPT VISIT HI MDM: CPT | Mod: 25

## 2022-05-23 PROCEDURE — 36415 COLL VENOUS BLD VENIPUNCTURE: CPT | Performed by: EMERGENCY MEDICINE

## 2022-05-23 PROCEDURE — 85610 PROTHROMBIN TIME: CPT | Performed by: EMERGENCY MEDICINE

## 2022-05-23 RX ORDER — ACETAMINOPHEN 500 MG
1000 TABLET ORAL EVERY 6 HOURS PRN
Status: DISCONTINUED | OUTPATIENT
Start: 2022-05-24 | End: 2022-05-24 | Stop reason: HOSPADM

## 2022-05-23 RX ORDER — NAPROXEN SODIUM 220 MG/1
324 TABLET, FILM COATED ORAL ONCE
Status: DISCONTINUED | OUTPATIENT
Start: 2022-05-23 | End: 2022-05-24 | Stop reason: HOSPADM

## 2022-05-23 RX ORDER — HYDRALAZINE HYDROCHLORIDE 20 MG/ML
10 INJECTION INTRAMUSCULAR; INTRAVENOUS EVERY 6 HOURS PRN
Status: DISCONTINUED | OUTPATIENT
Start: 2022-05-23 | End: 2022-05-24 | Stop reason: HOSPADM

## 2022-05-23 NOTE — NURSING
"Pt admitted to room 2504. Pt tearful, "I want to find out what's wrong with me." Pt agreeable to bed alarm. Call light in reach, left pt safe and comfortable.   "

## 2022-05-23 NOTE — ED TRIAGE NOTES
Pt complaints of mid-sternal chest pressure and sharp pains.  + some sob.  Denies n/v/d or fever.  Had CABG in 2013, thinks one of the wires has come loose and is poking her.  Chest pain is reproducible with certain movements.

## 2022-05-23 NOTE — ED PROVIDER NOTES
Encounter Date: 5/23/2022       History     Chief Complaint   Patient presents with    Chest Pain     Pt thinks one of the wires from her CABG might be poking her. Reproducible with certain movements.      This is a 61-year-old female with a history of coronary artery disease status post CABG 2013 who presents complaining of chest pain.  Patient reports intermittent sticking type chest pain to the midsternal area over the past several months.  She has had associated fatigue.  Symptoms have been increasing in frequency and severity.  Symptoms have started radiating from this area to the left chest.  The symptoms are sharp in nature but at times also pressure-like.  She denies shortness of breath but she has had worsening fatigue.  Symptoms which prompted cardiac investigation resulting in CABG were fatigue.  She denies trauma or lifting.  The symptoms have at times been pleuritic.  She also has had a history of pericarditis in states symptoms are currently mildly but not entirely similar to that previous diagnosis.  She denies any other problems complaints.        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    cyst removed from right breast      LASER LAPAROSCOPY      TUBAL LIGATION       Family History   Problem Relation Age of Onset    Cancer Maternal Grandmother     Breast cancer Sister     Asthma Sister     Arrhythmia Mother     Diabetes Mellitus Neg Hx      Social History     Tobacco Use    Smoking status: Current  Every Day Smoker    Smokeless tobacco: Never Used   Substance Use Topics    Alcohol use: Yes     Alcohol/week: 1.0 standard drink     Types: 1 Glasses of wine per week     Comment: socially    Drug use: No     Review of Systems   Constitutional: Positive for fatigue. Negative for activity change, appetite change, chills and fever.   HENT: Negative.  Negative for congestion, dental problem, ear pain, rhinorrhea, sinus pressure, sinus pain, sore throat and trouble swallowing.    Eyes: Negative.  Negative for photophobia, pain, redness and visual disturbance.   Respiratory: Negative.  Negative for cough, chest tightness, shortness of breath and wheezing.    Cardiovascular: Positive for chest pain. Negative for palpitations and leg swelling.   Gastrointestinal: Negative.  Negative for abdominal distention, abdominal pain, anal bleeding, blood in stool, constipation, diarrhea, nausea and vomiting.   Endocrine: Negative.    Genitourinary: Negative.  Negative for decreased urine volume, difficulty urinating, dysuria, flank pain, frequency, hematuria, pelvic pain and urgency.   Musculoskeletal: Negative.  Negative for arthralgias, back pain, gait problem, joint swelling, myalgias, neck pain and neck stiffness.   Skin: Negative.  Negative for color change, pallor and rash.   Neurological: Negative.  Negative for dizziness, tremors, seizures, syncope, facial asymmetry, speech difficulty, weakness, light-headedness, numbness and headaches.   Hematological: Negative.  Does not bruise/bleed easily.   Psychiatric/Behavioral: Negative.  Negative for confusion.   All other systems reviewed and are negative.      Physical Exam     Initial Vitals [05/23/22 1039]   BP Pulse Resp Temp SpO2   (!) 178/90 (!) 59 16 98.7 °F (37.1 °C) 99 %      MAP       --         Physical Exam    Nursing note and vitals reviewed.  Constitutional: She is not diaphoretic. She is active and cooperative.  Non-toxic appearance. She does not have a sickly  appearance. She does not appear ill. No distress.   HENT:   Head: Normocephalic and atraumatic.   Right Ear: Tympanic membrane normal.   Left Ear: Tympanic membrane normal.   Nose: Nose normal.   Mouth/Throat: Uvula is midline, oropharynx is clear and moist and mucous membranes are normal. No oral lesions. No uvula swelling. No oropharyngeal exudate, posterior oropharyngeal edema or posterior oropharyngeal erythema.   Eyes: Conjunctivae, EOM and lids are normal. Pupils are equal, round, and reactive to light. No scleral icterus.   Neck: Trachea normal and phonation normal. Neck supple. No thyroid mass and no thyromegaly present. No stridor present. No JVD present.   Normal range of motion.   Full passive range of motion without pain.     Cardiovascular: Normal rate, regular rhythm, normal heart sounds, intact distal pulses and normal pulses. Exam reveals no gallop, no distant heart sounds and no friction rub.    No murmur heard.  Pulmonary/Chest: Effort normal and breath sounds normal. No accessory muscle usage or stridor. No tachypnea. No respiratory distress. She has no wheezes. She has no rhonchi. She has no rales.   Abdominal: Abdomen is soft. Bowel sounds are normal. She exhibits no distension, no pulsatile midline mass and no mass. There is no abdominal tenderness. There is no rigidity and no guarding.   Musculoskeletal:         General: No tenderness or edema. Normal range of motion.      Right hand: Normal. Normal range of motion. Normal strength. Normal sensation. Normal capillary refill. Normal pulse.      Left hand: Normal. Normal range of motion. Normal strength. Normal sensation. Normal capillary refill. Normal pulse.      Cervical back: Normal, full passive range of motion without pain, normal range of motion and neck supple. No edema, erythema, rigidity or bony tenderness. No spinous process tenderness or muscular tenderness. Normal range of motion.      Thoracic back: Normal. No bony tenderness.  Normal range of motion.      Lumbar back: Normal. No bony tenderness. Normal range of motion.      Right foot: Normal. Normal range of motion and normal capillary refill. No tenderness or bony tenderness. Normal pulse.      Left foot: Normal. Normal range of motion and normal capillary refill. No tenderness or bony tenderness. Normal pulse.      Comments: Pulses are 2+ throughout, cap refill is less than 2 sec throughout, extremities are nontender throughout with full range of motion. There is no spinal tenderness to palpation.     Neurological: She is alert and oriented to person, place, and time. She has normal strength. She displays normal reflexes. No cranial nerve deficit or sensory deficit. Gait normal.   No focal deficits.   Skin: Skin is warm, dry and intact. Capillary refill takes less than 2 seconds. No ecchymosis, no petechiae and no rash noted. No erythema. No pallor.   Psychiatric: She has a normal mood and affect. Her speech is normal and behavior is normal. Judgment and thought content normal. Cognition and memory are normal.         ED Course   Procedures  Labs Reviewed   CBC W/ AUTO DIFFERENTIAL - Abnormal; Notable for the following components:       Result Value    RBC 6.34 (*)     MCV 68 (*)     MCH 20.0 (*)     MCHC 29.7 (*)     RDW 16.0 (*)     All other components within normal limits   COMPREHENSIVE METABOLIC PANEL - Abnormal; Notable for the following components:    Calcium 8.5 (*)     Alkaline Phosphatase 43 (*)     Anion Gap 7 (*)     All other components within normal limits   PROTIME-INR - Abnormal; Notable for the following components:    PT 15.4 (*)     All other components within normal limits   B-TYPE NATRIURETIC PEPTIDE   TROPONIN I   D DIMER, QUANTITATIVE   SARS-COV-2 RNA AMPLIFICATION, QUAL        ECG Results          EKG 12-lead (In process)  Result time 05/23/22 11:51:23    In process by Interface, Lab In [facility] (05/23/22 11:51:23)                 Narrative:    Test Reason :  R07.9,    Vent. Rate : 063 BPM     Atrial Rate : 063 BPM     P-R Int : 198 ms          QRS Dur : 088 ms      QT Int : 422 ms       P-R-T Axes : 022 062 070 degrees     QTc Int : 431 ms    Normal sinus rhythm  Normal ECG  When compared with ECG of 03-FEB-2021 20:13,  No significant change was found    Referred By: [email or web address]   SELF           Confirmed By:                             Imaging Results          X-Ray Chest AP Portable (Final result)  Result time 05/23/22 11:38:20    Final result by Fish Galvez MD (05/23/22 11:38:20)                 Narrative:    CLINICAL HISTORY:  61 years (1960) Female chest pain Chest Pain (Pt thinks one of the wires from her CABG might be poking her. Reproducible with certain movements. ); Hx of CABG 2013, CAD (coronary artery disease); ; Anomalous coronary artery origin    TECHNIQUE:  Portable AP radiograph the chest.    COMPARISON:  Most recent radiograph from August 20, 2020    FINDINGS:  The lungs are clear. Costophrenic angles are seen without effusion. No pneumothorax is identified. The heart is normal in size. Median sternotomy wires are unchanged. The mediastinum is within normal limits. Osseous structures appear within normal limits. The visualized upper abdomen is unremarkable.    IMPRESSION:  No acute cardiac or pulmonary process.                  .            Electronically signed by:  Fish Galvez MD  5/23/2022 11:38 AM CDT Workstation: [id number]                               Medications   aspirin chewable tablet 324 mg (has no administration in time range)     Medical Decision Making:   Clinical Tests:   Lab Tests: Reviewed  Radiological Study: Reviewed  Medical Tests: Reviewed  ED Management:  Patient is currently hemodynamically stable symptom free and in no distress.  First troponin is normal.  EKG is negative for evidence of acute ischemia.  I have discussed the case with the hospitalist provider who has assumed care will admit.                       Clinical Impression:   Final diagnoses:  [R07.9] Chest pain                 Alicia Dominique MD  05/25/22 0001

## 2022-05-23 NOTE — H&P
[facility] Medicine History & Physical Examination   Patient Name: Marie Salcido  MRN: [id number]  Patient Class: OP- Observation   Admission Date: 5/23/2022 10:29 AM  Length of Stay: 0  Attending Physician: [place] Shelton MD  Primary Care Provider: Justen Reyez MD  Face-to-Face encounter date: 05/23/2022  Code Status:full code  Chief Complaint: Chest Pain (Pt thinks one of the wires from her CABG might be poking her. Reproducible with certain movements. )        Patient information was obtained from patient, past medical records and ER records.   HISTORY OF PRESENT ILLNESS:   Marie Salcido is a 61 y.o.   female who  has a past medical history of Anemia, Coronary artery disease, Hyperlipidemia, Hypertension, Silent myocardial infarction, and Sleep apnea.. The patient presented to [facility] on 5/23/2022 with a primary complaint of Chest Pain (Pt thinks one of the wires from her CABG might be poking her. Reproducible with certain movements. )    She describes the chest pain as a sharp and stabbing pain which is intermittent located under her left breat radiating to midsternal region.She rated this pain for over 10/10 when your cause and states that has no aggravating factors however it is relieved by her laying still and hugging her chest tight.  It is associated with nausea, palpitations, lightheadedness and mild SOB. No associated vomiting,  orthopnea, pedal edema or abdominal pain.  Patient has had CABG in the past and states that she follows up with her cardiologist Dr. Cisse  every year.  patient states that she has not had similar pain like this in the past and states that she is worried that her CABG wires are causing the pain.  She does not smoke tobacco, however drinks cannabis for insomnia nightly with last use 3 days ago.  She does not use any other illicit drugs.  On presentation to the ED, blood pressure was elevated at 178 overnight T, EKG  showed no STEMI with no significant changes chest CT was negative and showed mediastinal wires in place , troponins negative and BNP within normal limit.  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    cyst removed from right breast      LASER LAPAROSCOPY      TUBAL LIGATION         ALLERGIES:   Opioids - morphine analogues and Codeine    FAMILY HISTORY:     Family History   Problem Relation Age of Onset    Cancer Maternal Grandmother     Breast cancer Sister     Asthma Sister     Arrhythmia Mother     Diabetes Mellitus Neg Hx      Reviewed and non- contributory   SOCIAL HISTORY:     Social History     Tobacco Use    Smoking status: Current Every Day Smoker    Smokeless tobacco: Never Used   Substance Use Topics    Alcohol use: Yes     Alcohol/week: 1.0 standard drink     Types: 1 Glasses of wine per week     Comment: socially        Social History     Substance and Sexual Activity   Sexual Activity Yes    Partners: Male        HOME MEDICATIONS:     Prior to Admission medications    Medication Sig Start Date End Date Taking? Authorizing Provider   ascorbic acid, vitamin C, (VITAMIN C) 1000 MG tablet Take 1,000 mg by mouth once daily.   Yes Historical Provider   aspirin (ECOTRIN) 81 MG EC tablet Take 81 mg by mouth once daily.   Yes Historical Provider   fish oil-omega-3 fatty acids 300-1,000 mg capsule Take by mouth once daily.    Historical Provider   ofloxacin (FLOXIN) 0.3 % otic solution Place 5 drops into the left ear once daily. 4/7/22   Johann Jones NP       REVIEW OF  "SYSTEMS:   10 Point Review of System was performed and was found to be negative except for that mentioned already in the HPI above.     PHYSICAL EXAM:   BP (!) 142/99   Pulse 61   Temp 98.7 °F (37.1 °C) (Oral)   Resp (!) 23   Ht 5' 7" (1.702 m)   Wt 97.5 kg (215 lb)   LMP 10/04/2013   SpO2 100%   Breastfeeding No   BMI 33.67 kg/m²     Vitals Reviewed  General appearance: Well-developed, well-nourished female in no apparent distress.  HENT: Atraumatic head. Moist mucous membranes of oral cavity.  Eyes: Normal extraocular movements.   Neck: Supple.   Lungs: Clear to auscultation bilaterally. No wheezing present.   Heart: Regular rate and rhythm. S1 and S2 present with no murmurs/gallop/rub. No pedal edema. No JVD present.  Mild Tenderness on chest palpation  Abdomen: Soft, non-distended, non-tender. No rebound tenderness/guarding. Bowel sounds are normal.   Extremities: No cyanosis, clubbing, or edema.  Skin: No Rash.   Neuro: Motor and sensory exams grossly intact. Good tone. Muscle strength 5/5 in all 4 extremities  Psych/mental status: Appropriate mood and affect. Responds appropriately to questions.     EMERGENCY DEPARTMENT LABS AND IMAGING:     Labs Reviewed   CBC W/ AUTO DIFFERENTIAL - Abnormal; Notable for the following components:       Result Value    RBC 6.34 (*)     MCV 68 (*)     MCH 20.0 (*)     MCHC 29.7 (*)     RDW 16.0 (*)     All other components within normal limits   COMPREHENSIVE METABOLIC PANEL - Abnormal; Notable for the following components:    Calcium 8.5 (*)     Alkaline Phosphatase 43 (*)     Anion Gap 7 (*)     All other components within normal limits   PROTIME-INR - Abnormal; Notable for the following components:    PT 15.4 (*)     All other components within normal limits   B-TYPE NATRIURETIC PEPTIDE   TROPONIN I   D DIMER, QUANTITATIVE   SARS-COV-2 RNA AMPLIFICATION, QUAL   D DIMER, QUANTITATIVE       CT Chest Without Contrast   Final Result      X-Ray Chest AP Portable   Final " Result      NM Myocardial Perfusion Spect Multi Pharmacologic    (Results Pending)       ASSESSMENT & PLAN:   Marie Salcido is a 61 y.o. female admitted for    Active Hospital Problems    Diagnosis    *Chest pain  Rule out ACS  Monitor Trops  Nitro patch  Continue daily aspirin 81mg   Due to high risk, stress test  Cardiac telemetry, echo  Naproxen for costochondritis      Elevated blood pressure reading  No history of hypertension, continue to monitor  P.r.n. hydralazine  If persistently elevated, start patient on HTN medication      CAD (coronary artery disease)  Continue aspirin, lipid panel          DVT Prophylaxis: will be placed on SCDs for DVT prophylaxis and will be advised to be as mobile as possible and sit in a chair as tolerated.   ________________________________________________________________________________    Face-to-Face encounter date: 05/23/2022  Encounter included review of the medical records, interviewing and examining the patient face-to-face, discussion with family and other health care providers including emergency medicine physician, admission orders, interpreting lab/test results and formulating a plan of care.     Medical Decision Making during this encounter was  [_] Low Complexity  [_] Moderate Complexity  [X] High Complexity  _________________________________________________________________________________    INPATIENT LIST OF MEDICATIONS     Current Facility-Administered Medications:     aspirin chewable tablet 324 mg, 324 mg, Oral, Once, Manjinder Zuniga MD    hydrALAZINE injection 10 mg, 10 mg, Intravenous, Q6H PRN, [place] Shel[place] MD    Current Outpatient Medications:     ascorbic acid, vitamin C, (VITAMIN C) 1000 MG tablet, Take 1,000 mg by mouth once daily., Disp: , Rfl:     aspirin (ECOTRIN) 81 MG EC tablet, Take 81 mg by mouth once daily., Disp: , Rfl:     fish oil-omega-3 fatty acids 300-1,000 mg capsule, Take by mouth once daily., Disp: , Rfl:     ofloxacin  (FLOXIN) 0.3 % otic solution, Place 5 drops into the left ear once daily., Disp: 5 mL, Rfl: 0      Scheduled Meds:   aspirin  324 mg Oral Once     Continuous Infusions:  PRN Meds:.hydrALAZINE      Suoth Shelton MD  [facility] Hospitalist  05/23/2022

## 2022-05-24 ENCOUNTER — CLINICAL SUPPORT (OUTPATIENT)
Dept: CARDIOLOGY | Facility: HOSPITAL | Age: 62
End: 2022-05-24
Attending: STUDENT IN AN ORGANIZED HEALTH CARE EDUCATION/TRAINING PROGRAM
Payer: OTHER GOVERNMENT

## 2022-05-24 ENCOUNTER — HOSPITAL ENCOUNTER (OUTPATIENT)
Dept: RADIOLOGY | Facility: HOSPITAL | Age: 62
Discharge: HOME | End: 2022-05-24
Attending: STUDENT IN AN ORGANIZED HEALTH CARE EDUCATION/TRAINING PROGRAM
Payer: OTHER GOVERNMENT

## 2022-05-24 VITALS
DIASTOLIC BLOOD PRESSURE: 79 MMHG | WEIGHT: 214.94 LBS | SYSTOLIC BLOOD PRESSURE: 136 MMHG | BODY MASS INDEX: 33.74 KG/M2 | OXYGEN SATURATION: 100 % | HEIGHT: 67 IN | HEART RATE: 58 BPM | RESPIRATION RATE: 18 BRPM | TEMPERATURE: 97 F

## 2022-05-24 PROBLEM — R03.0 ELEVATED BLOOD PRESSURE READING: Status: RESOLVED | Noted: 2022-05-23 | Resolved: 2022-05-24

## 2022-05-24 LAB
AV INDEX (PROSTH): 0.86
AV MEAN GRADIENT: 2 MMHG
AV VALVE AREA: 2.8 CM2
BSA FOR ECHO PROCEDURE: 2.15 M2
CV ECHO LV RWT: 0.59 CM
CV STRESS BASE HR: 71 BPM
DIASTOLIC BLOOD PRESSURE: 73 MMHG
DOP CALC AO VTI: 18.23 CM
DOP CALC LVOT AREA: 3.3 CM2
DOP CALC LVOT DIAMETER: 2.04 CM
DOP CALC LVOT PEAK VEL: 88.61 M/S
DOP CALC LVOT STROKE VOLUME: 51.03 CM3
DOP CALCLVOT PEAK VEL VTI: 15.62 CM
E WAVE DECELERATION TIME: 211.34 MSEC
E/A RATIO: 1.2
E/E' RATIO: 8.35 M/S
ECHO LV POSTERIOR WALL: 1.1 CM (ref 0.6–1.1)
EJECTION FRACTION: 52 %
FRACTIONAL SHORTENING: 23 % (ref 28–44)
GLUCOSE SERPL-MCNC: 89 MG/DL (ref 70–110)
INTERVENTRICULAR SEPTUM: 1.31 CM (ref 0.6–1.1)
IVC DIAMETER: 1.5 CM
LEFT ATRIUM SIZE: 2.99 CM
LEFT INTERNAL DIMENSION IN SYSTOLE: 2.84 CM (ref 2.1–4)
LEFT VENTRICLE DIASTOLIC VOLUME INDEX: 29 ML/M2
LEFT VENTRICLE DIASTOLIC VOLUME: 60.32 ML
LEFT VENTRICLE MASS INDEX: 72 G/M2
LEFT VENTRICLE SYSTOLIC VOLUME INDEX: 13.3 ML/M2
LEFT VENTRICLE SYSTOLIC VOLUME: 27.75 ML
LEFT VENTRICULAR INTERNAL DIMENSION IN DIASTOLE: 3.71 CM (ref 3.5–6)
LEFT VENTRICULAR MASS: 148.83 G
LV LATERAL E/E' RATIO: 7.89 M/S
LV SEPTAL E/E' RATIO: 8.88 M/S
MV PEAK A VEL: 0.59 M/S
MV PEAK E VEL: 0.71 M/S
OHS CV CPX 1 MINUTE RECOVERY HEART RATE: 119 BPM
OHS CV CPX 85 PERCENT MAX PREDICTED HEART RATE MALE: 131
OHS CV CPX MAX PREDICTED HEART RATE: 154
OHS CV CPX PATIENT IS FEMALE: 1
OHS CV CPX PATIENT IS MALE: 0
OHS CV CPX PEAK DIASTOLIC BLOOD PRESSURE: 84 MMHG
OHS CV CPX PEAK HEAR RATE: 138 BPM
OHS CV CPX PEAK RATE PRESSURE PRODUCT: NORMAL
OHS CV CPX PEAK SYSTOLIC BLOOD PRESSURE: 158 MMHG
OHS CV CPX PERCENT MAX PREDICTED HEART RATE ACHIEVED: 90
OHS CV CPX RATE PRESSURE PRODUCT PRESENTING: 8591
RA PRESSURE: 3 MMHG
RIGHT VENTRICULAR END-DIASTOLIC DIMENSION: 234 CM
STRESS ECHO POST EXERCISE DUR MIN: 3 MINUTES
STRESS ECHO POST EXERCISE DUR SEC: 30 SECONDS
SYSTOLIC BLOOD PRESSURE: 121 MMHG
TDI LATERAL: 0.09 M/S
TDI SEPTAL: 0.08 M/S
TDI: 0.09 M/S
TROPONIN I SERPL DL<=0.01 NG/ML-MCNC: <0.03 NG/ML

## 2022-05-24 PROCEDURE — 93306 ECHO (CUPID ONLY): ICD-10-PCS | Mod: 26,,, | Performed by: SPECIALIST

## 2022-05-24 PROCEDURE — 93306 TTE W/DOPPLER COMPLETE: CPT

## 2022-05-24 PROCEDURE — 93018 CV STRESS TEST I&R ONLY: CPT | Mod: ,,, | Performed by: SPECIALIST

## 2022-05-24 PROCEDURE — 93005 ELECTROCARDIOGRAM TRACING: CPT | Performed by: SPECIALIST

## 2022-05-24 PROCEDURE — 84484 ASSAY OF TROPONIN QUANT: CPT | Performed by: STUDENT IN AN ORGANIZED HEALTH CARE EDUCATION/TRAINING PROGRAM

## 2022-05-24 PROCEDURE — A9502 TC99M TETROFOSMIN: HCPCS

## 2022-05-24 PROCEDURE — 25000003 PHARM REV CODE 250: Performed by: STUDENT IN AN ORGANIZED HEALTH CARE EDUCATION/TRAINING PROGRAM

## 2022-05-24 PROCEDURE — 25000003 PHARM REV CODE 250: Performed by: INTERNAL MEDICINE

## 2022-05-24 PROCEDURE — 93010 EKG 12-LEAD: ICD-10-PCS | Mod: ,,, | Performed by: SPECIALIST

## 2022-05-24 PROCEDURE — 93016 CV STRESS TEST SUPVJ ONLY: CPT | Mod: ,,, | Performed by: SPECIALIST

## 2022-05-24 PROCEDURE — 93306 TTE W/DOPPLER COMPLETE: CPT | Mod: 26,,, | Performed by: SPECIALIST

## 2022-05-24 PROCEDURE — 93016 NUCLEAR STRESS TEST (CUPID ONLY): ICD-10-PCS | Mod: ,,, | Performed by: SPECIALIST

## 2022-05-24 PROCEDURE — 36415 COLL VENOUS BLD VENIPUNCTURE: CPT | Performed by: STUDENT IN AN ORGANIZED HEALTH CARE EDUCATION/TRAINING PROGRAM

## 2022-05-24 PROCEDURE — 93018 NUCLEAR STRESS TEST (CUPID ONLY): ICD-10-PCS | Mod: ,,, | Performed by: SPECIALIST

## 2022-05-24 PROCEDURE — 93010 ELECTROCARDIOGRAM REPORT: CPT | Mod: ,,, | Performed by: SPECIALIST

## 2022-05-24 PROCEDURE — G0378 HOSPITAL OBSERVATION PER HR: HCPCS

## 2022-05-24 PROCEDURE — 63600175 PHARM REV CODE 636 W HCPCS: Performed by: STUDENT IN AN ORGANIZED HEALTH CARE EDUCATION/TRAINING PROGRAM

## 2022-05-24 PROCEDURE — 93017 CV STRESS TEST TRACING ONLY: CPT

## 2022-05-24 RX ORDER — ASPIRIN 81 MG/1
81 TABLET ORAL DAILY
Status: DISCONTINUED | OUTPATIENT
Start: 2022-05-24 | End: 2022-05-24 | Stop reason: HOSPADM

## 2022-05-24 RX ORDER — NAPROXEN 250 MG/1
250 TABLET ORAL 2 TIMES DAILY WITH MEALS
Qty: 30 TABLET | Refills: 0 | Status: SHIPPED | OUTPATIENT
Start: 2022-05-24 | End: 2022-07-26 | Stop reason: SDUPTHER

## 2022-05-24 RX ORDER — PANTOPRAZOLE SODIUM 20 MG/1
20 TABLET, DELAYED RELEASE ORAL DAILY
Qty: 30 TABLET | Refills: 0 | Status: SHIPPED | OUTPATIENT
Start: 2022-05-24 | End: 2022-07-27

## 2022-05-24 RX ORDER — NAPROXEN 250 MG/1
250 TABLET ORAL 2 TIMES DAILY WITH MEALS
Status: DISCONTINUED | OUTPATIENT
Start: 2022-05-24 | End: 2022-05-24 | Stop reason: HOSPADM

## 2022-05-24 RX ORDER — NITROGLYCERIN 0.4 MG/1
0.4 TABLET SUBLINGUAL EVERY 5 MIN PRN
Status: DISCONTINUED | OUTPATIENT
Start: 2022-05-24 | End: 2022-05-24 | Stop reason: HOSPADM

## 2022-05-24 RX ORDER — POLYETHYLENE GLYCOL 3350 17 G/17G
17 POWDER, FOR SOLUTION ORAL DAILY
Status: DISCONTINUED | OUTPATIENT
Start: 2022-05-24 | End: 2022-05-24

## 2022-05-24 RX ORDER — SODIUM CHLORIDE 0.9 % (FLUSH) 0.9 %
10 SYRINGE (ML) INJECTION
Status: DISCONTINUED | OUTPATIENT
Start: 2022-05-24 | End: 2022-05-24 | Stop reason: HOSPADM

## 2022-05-24 RX ORDER — POLYETHYLENE GLYCOL 3350 17 G/17G
17 POWDER, FOR SOLUTION ORAL DAILY
Status: DISCONTINUED | OUTPATIENT
Start: 2022-05-24 | End: 2022-05-24 | Stop reason: HOSPADM

## 2022-05-24 RX ORDER — REGADENOSON 0.08 MG/ML
0.4 INJECTION, SOLUTION INTRAVENOUS
Status: COMPLETED | OUTPATIENT
Start: 2022-05-24 | End: 2022-05-24

## 2022-05-24 RX ORDER — ONDANSETRON 2 MG/ML
4 INJECTION INTRAMUSCULAR; INTRAVENOUS EVERY 8 HOURS PRN
Status: DISCONTINUED | OUTPATIENT
Start: 2022-05-24 | End: 2022-05-24 | Stop reason: HOSPADM

## 2022-05-24 RX ORDER — TRAMADOL HYDROCHLORIDE 50 MG/1
50 TABLET ORAL EVERY 6 HOURS PRN
Status: DISCONTINUED | OUTPATIENT
Start: 2022-05-24 | End: 2022-05-24 | Stop reason: HOSPADM

## 2022-05-24 RX ADMIN — ACETAMINOPHEN 1000 MG: 500 TABLET, FILM COATED ORAL at 11:05

## 2022-05-24 RX ADMIN — ASPIRIN 81 MG: 81 TABLET, COATED ORAL at 11:05

## 2022-05-24 RX ADMIN — REGADENOSON 0.4 MG: 0.08 INJECTION, SOLUTION INTRAVENOUS at 09:05

## 2022-05-24 RX ADMIN — NAPROXEN 250 MG: 250 TABLET ORAL at 11:05

## 2022-05-24 RX ADMIN — ACETAMINOPHEN 1000 MG: 500 TABLET, FILM COATED ORAL at 12:05

## 2022-05-24 NOTE — NURSING
Pt discharge education given. Pt discharged to home via car. Pt brought downstairs in wheelchair.

## 2022-05-24 NOTE — PLAN OF CARE
Problem: Adult Inpatient Plan of Care  Goal: Plan of Care Review  5/24/2022 1617 by Jodie Guaman RN  Outcome: Met  5/24/2022 1510 by Jodie Guaman RN  Outcome: Ongoing, Progressing  5/24/2022 1509 by Jodie Guaman RN  Outcome: Ongoing, Progressing  Goal: Patient-Specific Goal (Individualized)  5/24/2022 1617 by Jodie Guaman RN  Outcome: Met  5/24/2022 1510 by Jodie Guaman RN  Outcome: Ongoing, Progressing  5/24/2022 1509 by Jodie Guaman RN  Outcome: Ongoing, Progressing  Goal: Absence of Hospital-Acquired Illness or Injury  5/24/2022 1617 by Jodie Guaman RN  Outcome: Met  5/24/2022 1510 by Jodie Guaman RN  Outcome: Ongoing, Progressing  5/24/2022 1509 by Jodie Guaman RN  Outcome: Ongoing, Progressing  Goal: Optimal Comfort and Wellbeing  5/24/2022 1617 by Jodie Guaman RN  Outcome: Met  5/24/2022 1510 by Jodie Guaman RN  Outcome: Ongoing, Progressing  5/24/2022 1509 by Jodie Guaman RN  Outcome: Ongoing, Progressing  Goal: Readiness for Transition of Care  5/24/2022 1617 by Jodie Guaman RN  Outcome: Met  5/24/2022 1510 by Jodie Guaman RN  Outcome: Ongoing, Progressing  5/24/2022 1509 by Jodie Guaman RN  Outcome: Ongoing, Progressing

## 2022-05-24 NOTE — PLAN OF CARE
[facility]  Initial Discharge Assessment       Primary Care Provider: Justen Reyez MD    Admission Diagnosis: Chest pain [R07.9]    Admission Date: 5/23/2022  Expected Discharge Date: 5/24/2022         Payor:  / Plan:  [employer] / Product Type: Government /     Extended Emergency Contact Information  Primary Emergency Contact: Rip Salcido  Address: [address]           [place] [address] [address] [facility]  Home Phone: [phone]  Mobile Phone: [phone]  Relation: Spouse    Discharge Plan A: (P) Home, Home with family  Discharge Plan B: (P) Home with family      [id number] - [place] - [address] AT [facility] [address]  [address] [address]  Phone: [phone] Fax: [phone]      Initial Assessment (most recent)       Adult Discharge Assessment - 05/24/22 1455          Discharge Assessment    Assessment Type Discharge Planning Assessment (P)      Confirmed/corrected address, phone number and insurance Yes (P)      Confirmed Demographics Correct on Facesheet (P)      Lives With spouse (P)      Facility Arrived From: Home (P)      Do you expect to return to your current living situation? Yes (P)      Do you have help at home or someone to help you manage your care at home? Yes (P)      Prior to hospitilization cognitive status: Alert/Oriented (P)      Current cognitive status: Alert/Oriented (P)      Walking or Climbing Stairs Difficulty none (P)      Dressing/Bathing Difficulty none (P)      Equipment Currently Used at Home none (P)      Readmission within 30 days? No (P)      Do you have prescription coverage? Yes (P)      Coverage  (P)      Do you have any problems affording any of your prescribed medications? No (P)      Discharge Plan A Home;Home with family (P)      Discharge Plan B Home with family (P)      DME Needed Upon Discharge  none (P)

## 2022-05-24 NOTE — HOSPITAL COURSE
Marie Salciod is a 61 y.o.   female who  has a past medical history of Anemia, Coronary artery disease, Hyperlipidemia, Hypertension, Silent myocardial infarction, and Sleep apnea.. The patient presented to [facility] on 5/23/2022 with a primary complaint of Chest Pain (Pt thinks one of the wires from her CABG might be poking her. Reproducible with certain movements. )     She describes the chest pain as a sharp and stabbing pain which is intermittent located under her left breat radiating to midsternal region.She rated this pain for over 10/10 when your cause and states that has no aggravating factors however it is relieved by her laying still and hugging her chest tight.  It is associated with nausea, palpitations, lightheadedness and mild SOB. No associated vomiting,  orthopnea, pedal edema or abdominal pain.  Patient has had CABG in the past and states that she follows up with her cardiologist Dr. Cisse  every year.  patient states that she has not had similar pain like this in the past and states that she is worried that her CABG wires are causing the pain.  She does not smoke tobacco, however drinks cannabis for insomnia nightly with last use 3 days ago.  She does not use any other illicit drugs.  On presentation to the ED, blood pressure was elevated at 178 overnight T, EKG showed no STEMI with no significant changes chest CT was negative and showed mediastinal wires in place , troponins negative and BNP within normal limit.

## 2022-05-24 NOTE — PLAN OF CARE
Problem: Adult Inpatient Plan of Care  Goal: Plan of Care Review  5/24/2022 0628 by Betina Hall RN  Outcome: Ongoing, Progressing  5/24/2022 0605 by Betina Hall RN  Outcome: Ongoing, Progressing  Goal: Patient-Specific Goal (Individualized)  5/24/2022 0628 by Betina Hall RN  Outcome: Ongoing, Progressing  5/24/2022 0605 by Betina Hall RN  Outcome: Ongoing, Progressing  Goal: Absence of Hospital-Acquired Illness or Injury  5/24/2022 0628 by Betina Hall RN  Outcome: Ongoing, Progressing  5/24/2022 0605 by Betina Hall RN  Outcome: Ongoing, Progressing  Goal: Optimal Comfort and Wellbeing  5/24/2022 0628 by Betina Hall RN  Outcome: Ongoing, Progressing  5/24/2022 0605 by Betina Hall RN  Outcome: Ongoing, Progressing  Goal: Readiness for Transition of Care  5/24/2022 0628 by Betina Hall RN  Outcome: Ongoing, Progressing  5/24/2022 0605 by Betina Hall RN  Outcome: Ongoing, Progressing

## 2022-05-24 NOTE — DISCHARGE SUMMARY
[facility] Medicine  Discharge Summary      Patient Name: Marie Salcido  MRN: [id number]  Patient Class: OP- Observation  Admission Date: 5/23/2022  Hospital Length of Stay: 0 days  Discharge Date and Time:  05/24/2022 3:54 PM  Attending Physician: South Shelton MD   Discharging Provider: South Shelton MD  Primary Care Provider: Justen Reyez MD      HPI:   Marie Salcido is a 61 y.o.   female who  has a past medical history of Anemia, Coronary artery disease, Hyperlipidemia, Hypertension, Silent myocardial infarction, and Sleep apnea.. The patient presented to [facility] on 5/23/2022 with a primary complaint of Chest Pain (Pt thinks one of the wires from her CABG might be poking her. Reproducible with certain movements. )     She describes the chest pain as a sharp and stabbing pain which is intermittent located under her left breat radiating to midsternal region.She rated this pain for over 10/10 when your cause and states that has no aggravating factors however it is relieved by her laying still and hugging her chest tight.  It is associated with nausea, palpitations, lightheadedness and mild SOB. No associated vomiting,  orthopnea, pedal edema or abdominal pain.  Patient has had CABG in the past and states that she follows up with her cardiologist Dr. Cisse  every year.  patient states that she has not had similar pain like this in the past and states that she is worried that her CABG wires are causing the pain.  She does not smoke tobacco, however drinks cannabis for insomnia nightly with last use 3 days ago.  She does not use any other illicit drugs.  On presentation to the ED, blood pressure was elevated at 178 overnight T, EKG showed no STEMI with no significant changes chest CT was negative and showed mediastinal wires in place , troponins negative and BNP within normal limit.    * No surgery found *      Hospital Course:   Troponins were  negative and ACS ruled out, nuclear stress test negative for ischemia.  Chest pain was thought to be costochondritis and patient was sent home with naproxen.    Physical examination on discharge:  Constitutional: No distress.   HENT: NC  Head: Atraumatic.   Cardiovascular: Normal rate, regular rhythm and normal heart sounds.   Pulmonary/Chest: Effort normal. No wheezes.   Abdominal: Soft. Bowel sounds are normal. No distension and no mass. No tenderness  Neurological: Alert.   Skin: Skin is warm and dry.   Psych: Appropriate mood and affect         Goals of Care Treatment Preferences:  Code Status: Full Code    Living Will: Yes              Consults:   Consults (From admission, onward)        Status Ordering Provider     Inpatient consult to Hospitalist  Once        Provider:  South Shelton MD    Acknowledged SOUTH SHELTON          No new Assessment & Plan notes have been filed under this hospital service since the last note was generated.  Service: Hospital Medicine    Final Active Diagnoses:    Diagnosis Date Noted POA    PRINCIPAL PROBLEM:  Chest pain [R07.9] 03/21/2017 Yes    CAD (coronary artery disease) [I25.10] 08/08/2013 Yes      Problems Resolved During this Admission:    Diagnosis Date Noted Date Resolved POA    Elevated blood pressure reading [R03.0] 05/23/2022 05/24/2022 Yes       Discharged Condition: good    Disposition: Home or Self Care    Follow Up:   Follow-up Information     Justen Reyez MD Follow up in 1 week(s).    Specialty: Family Medicine  Contact information:  [address] [place] [address]  [address] [place] [address] [id number] [phone]                       Patient Instructions:      Activity as tolerated       Significant Diagnostic Studies: Labs:   BMP:   Recent Labs   Lab 05/23/22  1100         K 4.2      CO2 26   BUN 10   CREATININE 0.6   CALCIUM 8.5*   , CMP   Recent Labs   Lab 05/23/22  1100      K 4.2      CO2 26      BUN 10   CREATININE 0.6   CALCIUM  8.5*   PROT 7.3   ALBUMIN 4.0   BILITOT 0.7   ALKPHOS 43*   AST 20   ALT 23   ANIONGAP 7*   ESTGFRAFRICA >60.0   EGFRNONAA >60.0   , CBC   Recent Labs   Lab 05/23/22  1100   WBC 4.02   HGB 12.7   HCT 42.8      , Troponin   Recent Labs   Lab 05/23/22  1100 05/24/22  0647   TROPONINI <0.030 <0.030    and All labs within the past 24 hours have been reviewed  Radiology: X-Ray: CXR: X-Ray Chest 1 View (CXR): No results found for this visit on 05/23/22. and X-Ray Chest PA and Lateral (CXR): No results found for this visit on 05/23/22.  Cardiac Graphics: Echocardiogram:   2D echo with color flow doppler:   Results for orders placed or performed during the hospital encounter of 03/21/17   2D echo with color flow doppler   Result Value Ref Range    EF + QEF 60 55 - 65    Diastolic Dysfunction No     Narrative    Date of Procedure: 03/21/2017        TEST DESCRIPTION   Technical Quality: This is a technically adequate study.     Aorta: The aortic root is normal in size, measuring 2.8 cm at sinotubular junction.     Left Atrium: The left atrial volume index is normal, measuring 22.36 cc/m2.     Left Ventricle: The left ventricle is normal in size, with an end-diastolic diameter of 4.3 cm, and an end-systolic diameter of 2.1 cm. LV wall thickness is normal, with the septum and the posterior wall each measuring 1.2 cm across. Relative wall   thickness was increased at 0.56, and the LV mass index was increased at 104.1 g/m2 consistent with concentric left ventricular hypertrophy. Global left ventricular systolic function appears normal. Visually estimated ejection fraction is 60-65%. The LV   Doppler derived stroke volume equals 71.0 ccs.   The E/e'(mean) is 13, consistent with normal diastolic function.     Right Atrium: The right atrium is normal in size, measuring 4.3 cm in length and 3.4 cm in width in the apical view.     Right Ventricle: The right ventricle is normal in size. Global right ventricular systolic function  appears normal. Tricuspid annular plane systolic excursion (TAPSE) is 1.7 cm.     Aortic Valve:  The aortic valve is normal in structure. The mean gradient obtained across the aortic valve is 2.9 mmHg. Using a left ventricular outflow tract diameter of 2.0 cm, a left ventricular outflow tract velocity time integral of 23 cm, and a   peak instantaneous transvalvular velocity time integral of 28 cm, the calculated aortic valve area is 2.56 cm2.     Mitral Valve:  The mitral valve is normal in structure. The pressure half time is 61 msec. The calculated mitral valve area is 3.61 cm2.     Tricuspid Valve:  The tricuspid valve is normal in structure.     Pulmonary Valve:  The pulmonic valve is normal in structure.     IVC: IVC is enlarged but collapses > 50% with a sniff, suggesting intermediate right atrial pressure of 8 mmHg.     Intracavitary: There is no evidence of pericardial effusion, intracavity mass, thrombi, or vegetation.         CONCLUSIONS     1 - Normal left ventricular systolic function (EF 60-65%).     2 - Normal left ventricular diastolic function.     3 - Normal right ventricular systolic function .     4 - Intermediate central venous pressure.     5 - All valves appear normal.             This document has been electronically    SIGNED BY: Luís Lobo MD On: 03/21/2017 15:20    and Transthoracic echo (TTE) complete (Cupid Only):   Results for orders placed or performed during the hospital encounter of 05/23/22   Echo Saline Bubble? No   Result Value Ref Range    BSA 2.15 m2    Right Atrial Pressure (from IVC) 3 mmHg    IVC diameter 1.5 cm    TDI SEPTAL 0.08 m/s    LV LATERAL E/E' RATIO 7.89 m/s    LV SEPTAL E/E' RATIO 8.88 m/s    TDI LATERAL 0.09 m/s    LVIDd 3.71 3.5 - 6.0 cm    IVS 1.31 (A) 0.6 - 1.1 cm    Posterior Wall 1.10 0.6 - 1.1 cm    LVIDs 2.84 2.1 - 4.0 cm    FS 23 28 - 44 %    LV mass 148.83 g    LA size 2.99 cm    RVDD 234.00 cm    Left Ventricle Relative Wall Thickness 0.59 cm     AV mean gradient 2 mmHg    AV valve area 2.80 cm2    AV index (prosthetic) 0.86     E/A ratio 1.20     Mean e' 0.09 m/s    E wave deceleration time 211.34 msec    LVOT diameter 2.04 cm    LVOT area 3.3 cm2    LVOT peak jerel 88.61 m/s    LVOT peak VTI 15.62 cm    Ao VTI 18.23 cm    LVOT stroke volume 51.03 cm3    E/E' ratio 8.35 m/s    MV Peak E Jerel 0.71 m/s    MV Peak A Jerel 0.59 m/s    LV Systolic Volume 27.75 mL    LV Systolic Volume Index 13.3 mL/m2    LV Diastolic Volume 60.32 mL    LV Diastolic Volume Index 29.00 mL/m2    LV Mass Index 72 g/m2    EF 52 %    Narrative    · Normal central venous pressure (3 mmHg).  · The left ventricle is normal in size with low normal systolic function.  · Normal left ventricular diastolic function.  · The estimated ejection fraction is 52%.  · Atrial fibrillation not observed.  · Normal right ventricular size with normal right ventricular systolic   function.  · There is no pulmonary hypertension.  · Aortic valve is probably tricuspid but cannot exclude bicuspid valve-no   aortic regurgitation or stenosis          Pending Diagnostic Studies:     None         Medications:  Reconciled Home Medications:      Medication List      START taking these medications    naproxen 250 MG tablet  Commonly known as: NAPROSYN  Take 1 tablet (250 mg total) by mouth 2 (two) times daily with meals.     pantoprazole 20 MG tablet  Commonly known as: PROTONIX  Take 1 tablet (20 mg total) by mouth once daily.        CONTINUE taking these medications    ascorbic acid (vitamin C) 1000 MG tablet  Commonly known as: VITAMIN C  Take 1,000 mg by mouth once daily.     aspirin 81 MG EC tablet  Commonly known as: ECOTRIN  Take 81 mg by mouth once daily.     fish oil-omega-3 fatty acids 300-1,000 mg capsule  Take by mouth once daily.     ofloxacin 0.3 % otic solution  Commonly known as: FLOXIN  Place 5 drops into the left ear once daily.            Indwelling Lines/Drains at time of discharge:    Lines/Drains/Airways     None                 Time spent on the discharge of patient: 35 minutes         South Shelton MD  Department of Hospital Medicine  [facility]

## 2022-05-24 NOTE — PLAN OF CARE
Discharge orders and chart reviewed with no further post-acute discharge needs identified at this time.  At this time, patient is cleared for discharge from Case Management standpoint.        05/24/22 1621   Final Note   Assessment Type Final Discharge Note   Anticipated Discharge Disposition Home   Hospital Resources/Appts/Education Provided Appointments scheduled and added to AVS   Post-Acute Status   Discharge Delays None known at this time

## 2022-05-26 ENCOUNTER — PATIENT OUTREACH (OUTPATIENT)
Dept: FAMILY MEDICINE | Facility: CLINIC | Age: 62
End: 2022-05-26

## 2022-05-26 NOTE — TELEPHONE ENCOUNTER
Discharge Information     Discharge Date: 5-24-22      Primary Discharge Diagnosis:  Coronary artery disease/chest pain      Discharge Summary:  Reviewed      Medication & Order Review     Were medication changes made or new medications added?   No    If so, has the patient filled the prescriptions?       Was Home Health ordered? No    If so, has Home Health contacted patient and/or initiated services?      Name of Home Health Agency?     Durable Medical Equipment ordered?  No     If so, has the DME provider contacted patient and delivered equipment?  N/A    Follow Up               Any problems since discharge? No    How is the patient feeling since returning home?  Still feeling chest pain at times.     Have you set up recommended follow up appointments?  (cardiology, surgery, etc.)    Schedule Hospital Follow-up appointment within 7-14 days (preferably 7).  Appointment 6-1-22 9 am with Dr. Reyez    Notes:              Kitty Nieves

## 2022-06-01 ENCOUNTER — OFFICE VISIT (OUTPATIENT)
Dept: FAMILY MEDICINE | Facility: CLINIC | Age: 62
End: 2022-06-01
Payer: OTHER GOVERNMENT

## 2022-06-01 VITALS
RESPIRATION RATE: 16 BRPM | DIASTOLIC BLOOD PRESSURE: 83 MMHG | OXYGEN SATURATION: 99 % | SYSTOLIC BLOOD PRESSURE: 130 MMHG | TEMPERATURE: 99 F | BODY MASS INDEX: 33.64 KG/M2 | HEART RATE: 62 BPM | WEIGHT: 214.81 LBS

## 2022-06-01 DIAGNOSIS — R07.9 CHEST PAIN, UNSPECIFIED TYPE: ICD-10-CM

## 2022-06-01 DIAGNOSIS — M94.0 COSTOCHONDRITIS: Primary | ICD-10-CM

## 2022-06-01 PROCEDURE — 99214 OFFICE O/P EST MOD 30 MIN: CPT | Mod: S$PBB,AQ,, | Performed by: FAMILY MEDICINE

## 2022-06-01 PROCEDURE — 99214 PR OFFICE/OUTPT VISIT, EST, LEVL IV, 30-39 MIN: ICD-10-PCS | Mod: S$PBB,AQ,, | Performed by: FAMILY MEDICINE

## 2022-06-01 PROCEDURE — 99213 OFFICE O/P EST LOW 20 MIN: CPT | Performed by: FAMILY MEDICINE

## 2022-06-01 RX ORDER — AMOXICILLIN 500 MG
1 CAPSULE ORAL DAILY
COMMUNITY

## 2022-06-01 NOTE — PROGRESS NOTES
Subjective:       Patient ID: Marie Salcido is a 61 y.o. female.    Chief Complaint: Hospital Follow Up, Chest Pain, and Abdominal Pain      Patient is here today for hospital follow-up was admitted 1 week ago with chest pains.  Was admitted had negative cardiac enzymes negative nuclear stress and echo    SUBJECTIVE:  Patient ID: Marie Salcido is a 61 y.o. female.    Chief Complaint: Hospital Follow Up, Chest Pain, and Abdominal Pain    [unfilled]    Admit Date: 5/23/22   Discharge Date: 5/24/22  Discharge Facility: Hospital    Medication Reconciliation:  Medications changed/added/deleted.  PPI  New Prescriptions filled after discharge: yes  Discharge summary reviewed:  yes  Pending test results at discharge reviewed:   yes  Follow up appointments scheduled: no     Follow up labs/tests ordered:   yes  Home Health ordered on discharge:   not applicable  Home Health company name:   DME ordered at discharge:   not applicable  How patient is feeling since discharge from the hospital?  Better.  Reports some discomfort in the left ribcage at the costal margin with a palpable lump in that area.     Patient follow up phone call documented on separate encounter.    Admission on 05/23/2022, Discharged on 05/24/2022  WBC                                           Date: 05/23/2022  Value: 4.02        Ref range: 3.90 - 12.70 K/uL  Status: Final  RBC                                           Date: 05/23/2022  Value: 6.34 (A)    Ref range: 4.00 - 5.40 M/uL   Status: Final  Hemoglobin                                    Date: 05/23/2022  Value: 12.7        Ref range: 12.0 - 16.0 g/dL   Status: Final  Hematocrit                                    Date: 05/23/2022  Value: 42.8        Ref range: 37.0 - 48.5 %      Status: Final  MCV                                           Date: 05/23/2022  Value: 68 (A)      Ref range: 82 - 98 fL         Status: Final  MCH                                           Date: 05/23/2022  Value: 20.0 (A)     Ref range: 27.0 - 31.0 pg     Status: Final  MCHC                                          Date: 05/23/2022  Value: 29.7 (A)    Ref range: 32.0 - 36.0 g/dL   Status: Final  RDW                                           Date: 05/23/2022  Value: 16.0 (A)    Ref range: 11.5 - 14.5 %      Status: Final  Platelets                                     Date: 05/23/2022  Value: 281         Ref range: 150 - 450 K/uL     Status: Final  MPV                                           Date: 05/23/2022  Value: 11.0        Ref range: 9.2 - 12.9 fL      Status: Final  Immature Granulocytes                         Date: 05/23/2022  Value: 0.2         Ref range: 0.0 - 0.5 %        Status: Final  Gran # (ANC)                                  Date: 05/23/2022  Value: 2.3         Ref range: 1.8 - 7.7 K/uL     Status: Final  Immature Grans (Abs)                          Date: 05/23/2022  Value: 0.01        Ref range: 0.00 - 0.04 K/uL   Status: Final  Lymph #                                       Date: 05/23/2022  Value: 1.3         Ref range: 1.0 - 4.8 K/uL     Status: Final  Mono #                                        Date: 05/23/2022  Value: 0.3         Ref range: 0.3 - 1.0 K/uL     Status: Final  Eos #                                         Date: 05/23/2022  Value: 0.1         Ref range: 0.0 - 0.5 K/uL     Status: Final  Baso #                                        Date: 05/23/2022  Value: 0.02        Ref range: 0.00 - 0.20 K/uL   Status: Final  nRBC                                          Date: 05/23/2022  Value: 0           Ref range: 0 /100 WBC         Status: Final  Gran %                                        Date: 05/23/2022  Value: 57.1        Ref range: 38.0 - 73.0 %      Status: Final  Lymph %                                       Date: 05/23/2022  Value: 32.8        Ref range: 18.0 - 48.0 %      Status: Final  Mono %                                        Date: 05/23/2022  Value: 8.2         Ref range: 4.0 - 15.0 %        Status: Final  Eosinophil %                                  Date: 05/23/2022  Value: 1.2         Ref range: 0.0 - 8.0 %        Status: Final  Basophil %                                    Date: 05/23/2022  Value: 0.5         Ref range: 0.0 - 1.9 %        Status: Final  Differential Method                           Date: 05/23/2022  Value: Automated     Status: Final  Sodium                                        Date: 05/23/2022  Value: 137         Ref range: 136 - 145 mmol/L   Status: Final  Potassium                                     Date: 05/23/2022  Value: 4.2         Ref range: 3.5 - 5.1 mmol/L   Status: Final  Chloride                                      Date: 05/23/2022  Value: 104         Ref range: 95 - 110 mmol/L    Status: Final  CO2                                           Date: 05/23/2022  Value: 26          Ref range: 23 - 29 mmol/L     Status: Final  Glucose                                       Date: 05/23/2022  Value: 103         Ref range: 70 - 110 mg/dL     Status: Final  BUN                                           Date: 05/23/2022  Value: 10          Ref range: 6 - 20 mg/dL       Status: Final  Creatinine                                    Date: 05/23/2022  Value: 0.6         Ref range: 0.5 - 1.4 mg/dL    Status: Final  Calcium                                       Date: 05/23/2022  Value: 8.5 (A)     Ref range: 8.7 - 10.5 mg/dL   Status: Final  Total Protein                                 Date: 05/23/2022  Value: 7.3         Ref range: 6.0 - 8.4 g/dL     Status: Final  Albumin                                       Date: 05/23/2022  Value: 4.0         Ref range: 3.5 - 5.2 g/dL     Status: Final  Total Bilirubin                               Date: 05/23/2022  Value: 0.7         Ref range: 0.1 - 1.0 mg/dL    Status: Final  Alkaline Phosphatase                          Date: 05/23/2022  Value: 43 (A)      Ref range: 55 - 135 U/L       Status: Final  AST                                            Date: 05/23/2022  Value: 20          Ref range: 10 - 40 U/L        Status: Final  ALT                                           Date: 05/23/2022  Value: 23          Ref range: 10 - 44 U/L        Status: Final  Anion Gap                                     Date: 05/23/2022  Value: 7 (A)       Ref range: 8 - 16 mmol/L      Status: Final  eGFR if                       Date: 05/23/2022  Value: >60.0       Ref range: >60 mL/min/1.73 *  Status: Final  eGFR if non                   Date: 05/23/2022  Value: >60.0       Ref range: >60 mL/min/1.73 *  Status: Final  BNP                                           Date: 05/23/2022  Value: 25          Ref range: 0 - 99 pg/mL       Status: Final  Troponin I                                    Date: 05/23/2022  Value: <0.030      Ref range: <=0.040 ng/mL      Status: Final  PT                                            Date: 05/23/2022  Value: 15.4 (A)    Ref range: 11.4 - 13.7 sec    Status: Final  INR                                           Date: 05/23/2022  Value: 1.3           Status: Final  SARS-CoV-2 RNA, Amplification, Qual           Date: 05/23/2022  Value: Negative    Ref range: Negative           Status: Final  D-Dimer                                       Date: 05/23/2022  Value: <0.27       Ref range: <0.50 ug/mL FEU    Status: Final  BSA                                           Date: 05/24/2022  Value: 2.15        Ref range: m2                 Status: Final  Right Atrial Pressure (from IVC)              Date: 05/24/2022  Value: 3           Ref range: mmHg               Status: Final  IVC diameter                                  Date: 05/24/2022  Value: 1.5         Ref range: cm                 Status: Final  TDI SEPTAL                                    Date: 05/24/2022  Value: 0.08        Ref range: m/s                Status: Final  LV LATERAL E/E' RATIO                         Date: 05/24/2022  Value: 7.89        Ref range: m/s                 Status: Final  LV SEPTAL E/E' RATIO                          Date: 05/24/2022  Value: 8.88        Ref range: m/s                Status: Final  TDI LATERAL                                   Date: 05/24/2022  Value: 0.09        Ref range: m/s                Status: Final  LVIDd                                         Date: 05/24/2022  Value: 3.71        Ref range: 3.5 - 6.0 cm       Status: Final  IVS                                           Date: 05/24/2022  Value: 1.31 (A)    Ref range: 0.6 - 1.1 cm       Status: Final  Posterior Wall                                Date: 05/24/2022  Value: 1.10        Ref range: 0.6 - 1.1 cm       Status: Final  LVIDs                                         Date: 05/24/2022  Value: 2.84        Ref range: 2.1 - 4.0 cm       Status: Final  FS                                            Date: 05/24/2022  Value: 23          Ref range: 28 - 44 %          Status: Final  LV mass                                       Date: 05/24/2022  Value: 148.83      Ref range: g                  Status: Final  LA size                                       Date: 05/24/2022  Value: 2.99        Ref range: cm                 Status: Final  RVDD                                          Date: 05/24/2022  Value: 234.00      Ref range: cm                 Status: Final  Left Ventricle Relative Wall Thick*           Date: 05/24/2022  Value: 0.59        Ref range: cm                 Status: Final  AV mean gradient                              Date: 05/24/2022  Value: 2           Ref range: mmHg               Status: Final  AV valve area                                 Date: 05/24/2022  Value: 2.80        Ref range: cm2                Status: Final  AV index (prosthetic)                         Date: 05/24/2022  Value: 0.86          Status: Final  E/A ratio                                     Date: 05/24/2022  Value: 1.20          Status: Final  Mean e'                                       Date:  05/24/2022  Value: 0.09        Ref range: m/s                Status: Final  E wave deceleration time                      Date: 05/24/2022  Value: 211.34      Ref range: msec               Status: Final  LVOT diameter                                 Date: 05/24/2022  Value: 2.04        Ref range: cm                 Status: Final  LVOT area                                     Date: 05/24/2022  Value: 3.3         Ref range: cm2                Status: Final  LVOT peak jerel                                 Date: 05/24/2022  Value: 88.61       Ref range: m/s                Status: Final  LVOT peak VTI                                 Date: 05/24/2022  Value: 15.62       Ref range: cm                 Status: Final  Ao VTI                                        Date: 05/24/2022  Value: 18.23       Ref range: cm                 Status: Final  LVOT stroke volume                            Date: 05/24/2022  Value: 51.03       Ref range: cm3                Status: Final  E/E' ratio                                    Date: 05/24/2022  Value: 8.35        Ref range: m/s                Status: Final  MV Peak E Jerel                                 Date: 05/24/2022  Value: 0.71        Ref range: m/s                Status: Final  MV Peak A Jerel                                 Date: 05/24/2022  Value: 0.59        Ref range: m/s                Status: Final  LV Systolic Volume                            Date: 05/24/2022  Value: 27.75       Ref range: mL                 Status: Final  LV Systolic Volume Index                      Date: 05/24/2022  Value: 13.3        Ref range: mL/m2              Status: Final  LV Diastolic Volume                           Date: 05/24/2022  Value: 60.32       Ref range: mL                 Status: Final  LV Diastolic Volume Index                     Date: 05/24/2022  Value: 29.00       Ref range: mL/m2              Status: Final  LV Mass Index                                 Date: 05/24/2022  Value: 72           Ref range: g/m2               Status: Final  EF                                            Date: 05/24/2022  Value: 52          Ref range: %                  Status: Final  85% Max Predicted HR                          Date: 05/24/2022  Value: 131           Status: Final  Max Predicted HR                              Date: 05/24/2022  Value: 154           Status: Final  OHS CV CPX PATIENT IS MALE                    Date: 05/24/2022  Value: 0.0           Status: Final  [facility]HS CV CPX PATIENT IS FEMALE                  Date: 05/24/2022  Value: 1.0           Status: Final  HR at rest                                    Date: 05/24/2022  Value: 71          Ref range: bpm                Status: Final  Systolic blood pressure                       Date: 05/24/2022  Value: 121         Ref range: mmHg               Status: Final  Diastolic blood pressure                      Date: 05/24/2022  Value: 73          Ref range: mmHg               Status: Final  RPP                                           Date: 05/24/2022  Value: 8,591         Status: Final  Exercise duration (min)                       Date: 05/24/2022  Value: 3           Ref range: minutes            Status: Final  Exercise duration (sec)                       Date: 05/24/2022  Value: 30          Ref range: seconds            Status: Final  Peak HR                                       Date: 05/24/2022  Value: 138         Ref range: bpm                Status: Final  Peak Systolic BP                              Date: 05/24/2022  Value: 158         Ref range: mmHg               Status: Final  Peak Diatolic BP                              Date: 05/24/2022  Value: 84          Ref range: mmHg               Status: Final  Peak RPP                                      Date: 05/24/2022  Value: 21,804        Status: Final  % Max HR Achieved                             Date: 05/24/2022  Value: 90            Status: Final  1 Minute Recovery HR                          Date:  05/24/2022  Value: 119         Ref range: bpm                Status: Final  POC Glucose                                   Date: 05/24/2022  Value: 89          Ref range: 70 - 110           Status: Final  Troponin I                                    Date: 05/24/2022  Value: <0.030      Ref range: <=0.040 ng/mL      Status: Final  ------------  Office Visit on 12/16/2021  Glucose                                       Date: 05/18/2022  Value: 94          Ref range: 65 - 99 mg/dL      Status: Final  BUN                                           Date: 05/18/2022  Value: 11          Ref range: 7 - 25 mg/dL       Status: Final  Creatinine                                    Date: 05/18/2022  Value: 0.79        Ref range: 0.50 - 1.05 mg/dL  Status: Final  eGFR if non                   Date: 05/18/2022  Value: 82          Ref range: > OR = 60 mL/min*  Status: Final  eGFR if                       Date: 05/18/2022  Value: 95          Ref range: > OR = 60 mL/min*  Status: Final  BUN/Creatinine Ratio                          Date: 05/18/2022  Value: NOT APPLICABLE                     Ref range: 6 - 22 (calc)      Status: Final  Sodium                                        Date: 05/18/2022  Value: 140         Ref range: 135 - 146 mmol/L   Status: Final  Potassium                                     Date: 05/18/2022  Value: 4.4         Ref range: 3.5 - 5.3 mmol/L   Status: Final  Chloride                                      Date: 05/18/2022  Value: 104         Ref range: 98 - 110 mmol/L    Status: Final  CO2                                           Date: 05/18/2022  Value: 30          Ref range: 20 - 32 mmol/L     Status: Final  Calcium                                       Date: 05/18/2022  Value: 8.9         Ref range: 8.6 - 10.4 mg/dL   Status: Final  Total Protein                                 Date: 05/18/2022  Value: 6.8         Ref range: 6.1 - 8.1 g/dL     Status: Final  Albumin                                        Date: 05/18/2022  Value: 4.0         Ref range: 3.6 - 5.1 g/dL     Status: Final  Globulin, Total                               Date: 05/18/2022  Value: 2.8         Ref range: 1.9 - 3.7 g/dL (*  Status: Final  Albumin/Globulin Ratio                        Date: 05/18/2022  Value: 1.4         Ref range: 1.0 - 2.5 (calc)   Status: Final  Total Bilirubin                               Date: 05/18/2022  Value: 0.4         Ref range: 0.2 - 1.2 mg/dL    Status: Final  Alkaline Phosphatase                          Date: 05/18/2022  Value: 42          Ref range: 37 - 153 U/L       Status: Final  AST                                           Date: 05/18/2022  Value: 18          Ref range: 10 - 35 U/L        Status: Final  ALT                                           Date: 05/18/2022  Value: 17          Ref range: 6 - 29 U/L         Status: Final  Cholesterol                                   Date: 05/18/2022  Value: 160         Ref range: <200 mg/dL         Status: Final  HDL                                           Date: 05/18/2022  Value: 57          Ref range: > OR = 50 mg/dL    Status: Final  Triglycerides                                 Date: 05/18/2022  Value: 117         Ref range: <150 mg/dL         Status: Final  LDL Cholesterol                               Date: 05/18/2022  Value: 82          Ref range: mg/dL (calc)       Status: Final  HDL/Cholesterol Ratio                         Date: 05/18/2022  Value: 2.8         Ref range: <5.0 (calc)        Status: Final  Non HDL Chol. (LDL+VLDL)                      Date: 05/18/2022  Value: 103         Ref range: <130 mg/dL (calc)  Status: Final  HIV Ag/Ab 4th Gen                             Date: 05/18/2022  Value: NON-REACTIVE                     Ref range: NON-REACTIVE       Status: Final  Hepatitis C Ab                                Date: 05/18/2022  Value: NON-REACTIVE                     Ref range: NON-REACTIVE       Status:  Final  Signal/Cutoff                                 Date: 05/18/2022  Value: 0.02        Ref range: <1.00              Status: Final  ------------    Past Medical History:  No date: Anemia  No date: Coronary artery disease  No date: Hyperlipidemia  No date: Hypertension  No date: Silent myocardial infarction  No date: Sleep apnea  Past Surgical History:  No date: BREAST CYST ASPIRATION  10/4/13: CORONARY ARTERY BYPASS GRAFT      Comment:  2-vessel CABG LIMA-LAD, 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 Parrino [facility]                [facility]  No date: cyst removed from right breast  No date: LASER LAPAROSCOPY  No date: TUBAL LIGATION  Review of patient's family history indicates:  Problem: Cancer      Relation: Maternal Grandmother          Age of Onset: (Not Specified)  Problem: Breast cancer      Relation: Sister          Age of Onset: (Not Specified)  Problem: Asthma      Relation: Sister          Age of Onset: (Not Specified)  Problem: Arrhythmia      Relation: Mother          Age of Onset: (Not Specified)  Problem: Diabetes Mellitus      Relation: Neg Hx          Age of Onset: (Not Specified)      Marital Status:   Alcohol History:  reports current alcohol use of about 1.0 standard drink of alcohol per week.  Tobacco History:  reports that she has been smoking. She has never used smokeless tobacco.  Drug History:  reports no history of drug use.    Review of patient's allergies indicates:   -- Opioids - morphine analogues    -- Codeine -- Rash  Current Outpatient Medications:   ascorbic acid, vitamin C, (VITAMIN C) 1000 MG tablet, Take 1,000 mg by mouth once daily., Disp: , Rfl:   aspirin (ECOTRIN) 81 MG EC tablet, Take 81 mg by mouth once daily., Disp: , Rfl:   naproxen (NAPROSYN) 250 MG tablet, Take 1 tablet (250 mg total) by mouth 2 (two) times daily with meals., Disp: 30 tablet, Rfl: 0  omega-3 fatty acids/fish oil (FISH OIL-OMEGA-3 FATTY ACIDS)  300-1,000 mg capsule, Take 1 capsule by mouth once daily., Disp: , Rfl:   ofloxacin (FLOXIN) 0.3 % otic solution, Place 5 drops into the left ear once daily. (Patient not taking: Reported on 6/1/2022), Disp: 5 mL, Rfl: 0  pantoprazole (PROTONIX) 20 MG tablet, Take 1 tablet (20 mg total) by mouth once daily. (Patient not taking: Reported on 6/1/2022), Disp: 30 tablet, Rfl: 0    [unfilled]    Objective:    ----------------------------------                   06/01/22                             0915            ----------------------------------   BP:              130/83            Pulse:             62              Resp:              16              Temp:       98.6 °F (37 °C)        SpO2:             99%              Weight: 97.4 kg (214 lb 12.8 oz)  ----------------------------------  [unfilled]   Assessment:     No diagnosis found.    Plan:     There are no diagnoses linked to this encounter.  No follow-ups on file.               Chest Pain   Associated symptoms include abdominal pain.   Abdominal Pain  Pertinent negatives include no constipation.       Allergies and Medications:   Review of patient's allergies indicates:   Allergen Reactions    Opioids - morphine analogues     Codeine Rash     Current Outpatient Medications   Medication Sig Dispense Refill    ascorbic acid, vitamin C, (VITAMIN C) 1000 MG tablet Take 1,000 mg by mouth once daily.      aspirin (ECOTRIN) 81 MG EC tablet Take 81 mg by mouth once daily.      naproxen (NAPROSYN) 250 MG tablet Take 1 tablet (250 mg total) by mouth 2 (two) times daily with meals. 30 tablet 0    omega-3 fatty acids/fish oil (FISH OIL-OMEGA-3 FATTY ACIDS) 300-1,000 mg capsule Take 1 capsule by mouth once daily.      ofloxacin (FLOXIN) 0.3 % otic solution Place 5 drops into the left ear once daily. (Patient not taking: Reported on 6/1/2022) 5 mL 0    pantoprazole (PROTONIX) 20 MG tablet Take 1 tablet (20 mg total) by mouth once daily.  (Patient not taking: Reported on 6/1/2022) 30 tablet 0     No current facility-administered medications for this visit.       Family History:   Family History   Problem Relation Age of Onset    Cancer Maternal Grandmother     Breast cancer Sister     Asthma Sister     Arrhythmia Mother     Diabetes Mellitus Neg Hx        Social History:   Social History     Socioeconomic History    Marital status:    Tobacco Use    Smoking status: Current Every Day Smoker    Smokeless tobacco: Never Used   Substance and Sexual Activity    Alcohol use: Yes     Alcohol/week: 1.0 standard drink     Types: 1 Glasses of wine per week     Comment: socially    Drug use: No    Sexual activity: Yes     Partners: Male       Review of Systems   Cardiovascular: Positive for chest pain (Improving compared to while in the hospital.).   Gastrointestinal: Positive for abdominal pain. Negative for anal bleeding, blood in stool and constipation.       Objective:     Vitals:    06/01/22 0915   BP: 130/83   Pulse: 62   Resp: 16   Temp: 98.6 °F (37 °C)        Physical Exam  Vitals and nursing note reviewed.   Constitutional:       Appearance: She is well-developed.   HENT:      Head: Normocephalic and atraumatic.   Eyes:      Pupils: Pupils are equal, round, and reactive to light.   Cardiovascular:      Rate and Rhythm: Normal rate and regular rhythm.      Heart sounds: Normal heart sounds. No murmur heard.    No friction rub. No gallop.   Pulmonary:      Effort: Pulmonary effort is normal. No respiratory distress.      Breath sounds: Normal breath sounds. No stridor. No wheezing, rhonchi or rales.   Chest:      Chest wall: No tenderness.       Psychiatric:         Behavior: Behavior normal.         Thought Content: Thought content normal.         Judgment: Judgment normal.         Assessment:       1. Costochondritis    2. Chest pain, unspecified type        Plan:       Marie was seen today for hospital follow up, chest pain and  abdominal pain.    Diagnoses and all orders for this visit:    Costochondritis  -     US Abdomen Limited; Future    Chest pain, unspecified type  -     US Abdomen Limited; Future         Follow up in about 3 months (around 9/1/2022), or if symptoms worsen or fail to improve, for follow up cholesterol, follow up HTN.

## 2022-07-12 ENCOUNTER — HOSPITAL ENCOUNTER (OUTPATIENT)
Dept: RADIOLOGY | Facility: HOSPITAL | Age: 62
Discharge: HOME | End: 2022-07-12
Attending: FAMILY MEDICINE
Payer: OTHER GOVERNMENT

## 2022-07-12 ENCOUNTER — ANCILLARY ORDERS (OUTPATIENT)
Dept: FAMILY MEDICINE | Facility: CLINIC | Age: 62
End: 2022-07-12

## 2022-07-12 ENCOUNTER — TELEPHONE (OUTPATIENT)
Dept: FAMILY MEDICINE | Facility: CLINIC | Age: 62
End: 2022-07-12

## 2022-07-12 DIAGNOSIS — K76.0 HEPATIC STEATOSIS: Primary | ICD-10-CM

## 2022-07-12 DIAGNOSIS — M94.0 COSTOCHONDRITIS: ICD-10-CM

## 2022-07-12 DIAGNOSIS — R07.9 CHEST PAIN, UNSPECIFIED TYPE: ICD-10-CM

## 2022-07-12 PROCEDURE — 76700 US EXAM ABDOM COMPLETE: CPT | Mod: TC,PO

## 2022-07-12 RX ORDER — OMEGA-3/DHA/EPA/FISH OIL 100-150 MG
2 CAPSULE ORAL 2 TIMES DAILY
Qty: 120 CAPSULE | Refills: 11 | COMMUNITY
Start: 2022-07-12 | End: 2023-07-12

## 2022-07-12 NOTE — PROGRESS NOTES
The ultrasound shows no gallstones kidney stones or obstruction however there is significant fatty changes in liver which may benefit from a fish oil supplement and dietary changes I will send in a prescription and follow-up in 3 months.  Limit alcohol consumption and decrease starches and fat intake.

## 2022-07-12 NOTE — TELEPHONE ENCOUNTER
----- Message from Justen Reyez MD sent at 7/12/2022 11:10 AM CDT -----  The ultrasound shows no gallstones kidney stones or obstruction however there is significant fatty changes in liver which may benefit from a fish oil supplement and dietary changes I will send in a prescription and follow-up in 3 months.  Limit alcohol consumption and decrease starches and fat intake.

## 2022-07-26 ENCOUNTER — HOSPITAL ENCOUNTER (EMERGENCY)
Facility: HOSPITAL | Age: 62
Discharge: HOME | End: 2022-07-26
Attending: EMERGENCY MEDICINE
Payer: OTHER GOVERNMENT

## 2022-07-26 VITALS
TEMPERATURE: 99 F | HEIGHT: 67 IN | DIASTOLIC BLOOD PRESSURE: 97 MMHG | RESPIRATION RATE: 18 BRPM | OXYGEN SATURATION: 96 % | HEART RATE: 94 BPM | WEIGHT: 214 LBS | SYSTOLIC BLOOD PRESSURE: 171 MMHG | BODY MASS INDEX: 33.59 KG/M2

## 2022-07-26 DIAGNOSIS — M25.559 HIP PAIN: Primary | ICD-10-CM

## 2022-07-26 PROCEDURE — 96372 THER/PROPH/DIAG INJ SC/IM: CPT | Performed by: PHYSICIAN ASSISTANT

## 2022-07-26 PROCEDURE — 99284 EMERGENCY DEPT VISIT MOD MDM: CPT

## 2022-07-26 PROCEDURE — 63600175 PHARM REV CODE 636 W HCPCS: Performed by: PHYSICIAN ASSISTANT

## 2022-07-26 RX ORDER — NAPROXEN 500 MG/1
500 TABLET ORAL 2 TIMES DAILY WITH MEALS
Qty: 14 TABLET | Refills: 0 | Status: SHIPPED | OUTPATIENT
Start: 2022-07-26 | End: 2022-07-27

## 2022-07-26 RX ORDER — ORPHENADRINE CITRATE 30 MG/ML
60 INJECTION INTRAMUSCULAR; INTRAVENOUS
Status: COMPLETED | OUTPATIENT
Start: 2022-07-26 | End: 2022-07-26

## 2022-07-26 RX ORDER — METHOCARBAMOL 500 MG/1
1000 TABLET, FILM COATED ORAL 2 TIMES DAILY PRN
Qty: 20 TABLET | Refills: 0 | Status: SHIPPED | OUTPATIENT
Start: 2022-07-26 | End: 2022-07-27

## 2022-07-26 RX ORDER — KETOROLAC TROMETHAMINE 30 MG/ML
30 INJECTION, SOLUTION INTRAMUSCULAR; INTRAVENOUS
Status: COMPLETED | OUTPATIENT
Start: 2022-07-26 | End: 2022-07-26

## 2022-07-26 RX ADMIN — KETOROLAC TROMETHAMINE 30 MG: 30 INJECTION, SOLUTION INTRAMUSCULAR; INTRAVENOUS at 01:07

## 2022-07-26 RX ADMIN — ORPHENADRINE CITRATE 60 MG: 30 INJECTION INTRAMUSCULAR; INTRAVENOUS at 01:07

## 2022-07-26 NOTE — DISCHARGE INSTRUCTIONS
Take medication as needed for pain. Follow up with orthopedics.  For worsening symptoms, chest pain, shortness of breath, increased abdominal pain, high grade fever, stroke or stroke like symptoms, immediately go to the nearest Emergency Room or call 911 as soon as possible.

## 2022-07-26 NOTE — ED PROVIDER NOTES
Encounter Date: 7/26/2022    SCRIBE #1 NOTE: I, Indira Fuller, am scribing for, and in the presence of, Chikis Bello PA-C.       History     Chief Complaint   Patient presents with    Back Pain     Lower back pain and bilateral hip pain since yesterday. Denies injury.      Time seen by provider: 12:41 PM on 07/26/2022    Marie Salcido is a 61 y.o. female who presents to the ED with bilateral hip pain that began yesterday. Pt reports pain shoots down her thighs. Pt has taken tylenol and Advil for the pain. The patient denies fever, SOB, or any other symptoms at this time. PMHx of CAD, silent MI, anemia, HTN, and HLD. PSHx of tubal ligation, lase laparoscopy, and CABG.     The history is provided by the patient.     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    cyst removed from right breast      LASER LAPAROSCOPY      TUBAL LIGATION       Family History   Problem Relation Age of Onset    Cancer Maternal Grandmother     Breast cancer Sister     Asthma Sister     Arrhythmia Mother     Diabetes Mellitus Neg Hx      Social History     Tobacco Use    Smoking status: Current Every Day Smoker    Smokeless tobacco: Never Used   Substance Use Topics    Alcohol use: Yes     Alcohol/week: 1.0 standard drink     Types: 1 Glasses of wine per week     Comment: socially    Drug use: No     Review of Systems   Constitutional: Negative for fever.   Respiratory: Negative for shortness of breath.    Genitourinary: Negative  for flank pain.   Musculoskeletal: Positive for arthralgias and myalgias. Negative for gait problem.   Neurological: Negative for weakness.   Psychiatric/Behavioral: Negative for confusion.       Physical Exam     Initial Vitals [07/26/22 1228]   BP Pulse Resp Temp SpO2   (!) 171/97 94 18 98.6 °F (37 °C) 96 %      MAP       --         Physical Exam    Nursing note and vitals reviewed.  Constitutional: She appears well-developed and well-nourished. She is not diaphoretic. No distress.   HENT:   Head: Normocephalic and atraumatic.   Cardiovascular: Normal rate, regular rhythm and intact distal pulses.   Pulmonary/Chest: Breath sounds normal. No respiratory distress. She has no wheezes. She has no rales.   Musculoskeletal:         General: Tenderness present. Normal range of motion.      Right hip: Tenderness present. Normal range of motion. Normal strength.      Left hip: Tenderness present. Normal range of motion. Normal strength.      Comments: Tenderness to bilateral hips with no skin changes. No decreased ROM. Ambulating without difficulty. 2+ pedal pulse bilaterally. sensation intact.      Neurological: She is alert and oriented to person, place, and time. She has normal strength. No sensory deficit.   Skin: Skin is warm and dry. No rash and no abscess noted. No erythema.   Psychiatric: She has a normal mood and affect.         ED Course   Procedures  Labs Reviewed - No data to display       Imaging Results          X-Ray Hips Bilateral 2 View Incl AP Pelvis (Final result)  Result time 07/26/22 13:16:25    Final result by Je Victoria Jr., MD (07/26/22 13:16:25)                 Impression:      Negative x-rays of both hips and pelvis.      Electronically signed by: Je Victoria MD  Date:    07/26/2022  Time:    13:16             Narrative:    EXAMINATION:  XR HIPS BILATERAL 2 VIEW INCL AP PELVIS    CLINICAL HISTORY:  Pain in unspecified hip    TECHNIQUE:  AP view of the pelvis and frogleg lateral views  of both hips were performed.    COMPARISON:  None.    FINDINGS:  A fracture of either hip is not seen.  Dislocation is not noted.  The acetabula are well maintained.  The bones of the pelvis are intact.  The sacroiliac joints are sharp and symmetric                                 Medications   ketorolac injection 30 mg (30 mg Intramuscular Given 7/26/22 1335)   orphenadrine injection 60 mg (60 mg Intramuscular Given 7/26/22 1336)     Medical Decision Making:   History:   Old Medical Records: I decided to obtain old medical records.  Clinical Tests:   Radiological Study: Ordered and Reviewed       APC / Resident Notes:   Urgent evaluation of a 61-year-old female who presents with atraumatic bilateral hip pain radiating to the thighs.  She is neurovascularly intact.  She denies injury.  She has tenderness to palpation and range of movement.  She has no lumbar spine tenderness.  No signs cauda equina.  While she was waiting for her medications to be administered she was ambulating up and down the hallway with a normal gait.  X-ray showed no acute abnormalities.  She was given IM medications with improvement.  Recommend close follow-up Orthopedics. Discussed results with patient. Return precautions given. Based on my clinical evaluation, I do not appreciate any immediate, emergent, or life threatening condition or etiology that warrants additional workup today and feel that the patient can be discharged with close follow up care.  Patient is to follow up with their primary care provider. Case was discussed with Dr. Plaza who is in agreement with the plan of care. All questions answered.        Scribe Attestation:   Scribe #1: I performed the above scribed service and the documentation accurately describes the services I performed. I attest to the accuracy of the note.              I, Chikis Bello PA-C, personally performed the services described in this documentation. All medical record entries made by the  flaco were at my direction and in my presence.  I have reviewed the chart and agree that the record reflects my personal performance and is accurate and complete. Chikis Bello PA-C.  5:46 PM 07/26/2022      Clinical Impression:   Final diagnoses:  [M25.559] Hip pain (Primary)          ED Disposition Condition    Discharge Stable        ED Prescriptions     Medication Sig Dispense Start Date End Date Auth. Provider    naproxen (NAPROSYN) 500 MG tablet Take 1 tablet (500 mg total) by mouth 2 (two) times daily with meals. for 7 days 14 tablet 7/26/2022 8/2/2022 Chikis Bello PA-C    methocarbamoL (ROBAXIN) 500 MG Tab Take 2 tablets (1,000 mg total) by mouth 2 (two) times daily as needed (muscle spasm/pain). 20 tablet 7/26/2022 8/5/2022 Chikis Bello PA-C        Follow-up Information     Follow up With Specialties Details Why Contact Info    Justen Reyez MD Family Medicine   [address]  [address]  [place] [address]  [phone]      Nate Sumner MD Orthopedic Surgery, Surgery, Sports Medicine   [address]  [address]  [place] [address]  [phone]      [facility] Emergency Dept Emergency Medicine  As needed [address] [place] [id number]  [phone]           Chikis Bello PA-C  07/26/22 [id number]

## 2022-07-27 ENCOUNTER — OFFICE VISIT (OUTPATIENT)
Dept: FAMILY MEDICINE | Facility: CLINIC | Age: 62
End: 2022-07-27
Payer: OTHER GOVERNMENT

## 2022-07-27 VITALS
WEIGHT: 209.5 LBS | BODY MASS INDEX: 32.88 KG/M2 | HEIGHT: 67 IN | DIASTOLIC BLOOD PRESSURE: 78 MMHG | HEART RATE: 72 BPM | RESPIRATION RATE: 16 BRPM | TEMPERATURE: 98 F | SYSTOLIC BLOOD PRESSURE: 126 MMHG | OXYGEN SATURATION: 97 %

## 2022-07-27 DIAGNOSIS — S39.012A STRAIN OF LUMBAR REGION, INITIAL ENCOUNTER: ICD-10-CM

## 2022-07-27 PROCEDURE — 99213 OFFICE O/P EST LOW 20 MIN: CPT | Performed by: FAMILY MEDICINE

## 2022-07-27 PROCEDURE — 99214 OFFICE O/P EST MOD 30 MIN: CPT | Mod: S$PBB,AQ,, | Performed by: FAMILY MEDICINE

## 2022-07-27 PROCEDURE — 99214 PR OFFICE/OUTPT VISIT, EST, LEVL IV, 30-39 MIN: ICD-10-PCS | Mod: S$PBB,AQ,, | Performed by: FAMILY MEDICINE

## 2022-07-27 RX ORDER — PANTOPRAZOLE SODIUM 20 MG/1
TABLET, DELAYED RELEASE ORAL
COMMUNITY
Start: 2022-05-24 | End: 2022-09-12

## 2022-07-27 NOTE — PROGRESS NOTES
Subjective:       Patient ID: Marie Salcido is a 61 y.o. female.    Chief Complaint: No chief complaint on file.      Patient is here for follow-up from the emergency room.  She had slept on a very firm so for 2 days ago and woke with severe back pain in the low back hips and radiating to both legs down to the cast.  She went to the emergency room and was discharged.  Patient Active Problem List:     CAD (coronary artery disease)     Anomalous coronary artery origin     Anomalous coronary artery communication     Hyperglycemia     S/P CABG x 2     Obesity     Chest pain     Tobacco use     Idiopathic pericarditis     Fatty liver     Neck pain     Acute pain of left shoulder     Left arm weakness     Left arm pain     Left forearm pain        Back Pain  This is a recurrent problem. The current episode started yesterday. The problem occurs intermittently. The problem is unchanged. The pain is present in the lumbar spine. The quality of the pain is described as aching, cramping and shooting. The pain radiates to the left knee, left thigh, right foot, right knee and right thigh. The pain is at a severity of 10/10. The pain is severe. The pain is worse during the night. The symptoms are aggravated by bending, position, lying down, sitting, standing and twisting. Stiffness is present at night. Associated symptoms include abdominal pain, headaches, leg pain, paresthesias, pelvic pain and weakness. Pertinent negatives include no bowel incontinence, chest pain, dysuria, fever, numbness, paresis, perianal numbness, tingling or weight loss. The treatment provided moderate relief.       Allergies and Medications:   Review of patient's allergies indicates:   Allergen Reactions    Opioids - morphine analogues     Codeine Rash     Current Outpatient Medications   Medication Sig Dispense Refill    ascorbic acid, vitamin C, (VITAMIN C) 1000 MG tablet Take 1,000 mg by mouth once daily.      aspirin (ECOTRIN) 81 MG EC tablet  Take 81 mg by mouth once daily.      omega 3-dha-epa-fish oil 100-150-750 mg Cap Take 2 capsules by mouth 2 (two) times daily. 120 capsule 11    omega-3 fatty acids/fish oil (FISH OIL-OMEGA-3 FATTY ACIDS) 300-1,000 mg capsule Take 1 capsule by mouth once daily.      pantoprazole (PROTONIX) 20 MG tablet        No current facility-administered medications for this visit.       Family History:   Family History   Problem Relation Age of Onset    Cancer Maternal Grandmother     Breast cancer Sister     Asthma Sister     Arrhythmia Mother     Diabetes Mellitus Neg Hx        Social History:   Social History     Socioeconomic History    Marital status:    Tobacco Use    Smoking status: Current Every Day Smoker    Smokeless tobacco: Never Used   Substance and Sexual Activity    Alcohol use: Yes     Alcohol/week: 1.0 standard drink     Types: 1 Glasses of wine per week     Comment: socially    Drug use: No    Sexual activity: Yes     Partners: Male       Review of Systems   Constitutional: Negative for fever and weight loss.   Cardiovascular: Negative for chest pain.   Gastrointestinal: Positive for abdominal pain. Negative for bowel incontinence.   Genitourinary: Positive for pelvic pain. Negative for dysuria.   Musculoskeletal: Positive for back pain.   Neurological: Positive for weakness, headaches and paresthesias. Negative for tingling and numbness.       Objective:     Vitals:    07/27/22 1605   BP: 126/78   Pulse: 72   Resp: 16   Temp: 98.4 °F (36.9 °C)        Physical Exam  Musculoskeletal:        Back:       Comments: Range of motion left and right flexion torsion and forward flexion 85.         Assessment:       1. Strain of lumbar region, initial encounter        Plan:       Diagnoses and all orders for this visit:    Strain of lumbar region, initial encounter  Continue methocarbamol 1 q.h.s. and naproxen b.i.d. as needed may continue longer.  Patient was recommended to do yoga or stretching  exercises at home.  May consider physical therapy in 2 weeks if no improvement.       Follow up in about 2 weeks (around 8/10/2022), or if symptoms worsen or fail to improve.    Answers for HPI/ROS submitted by the patient on 7/27/2022  genital pain: No

## 2022-07-28 ENCOUNTER — TELEPHONE (OUTPATIENT)
Dept: FAMILY MEDICINE | Facility: CLINIC | Age: 62
End: 2022-07-28

## 2022-07-28 NOTE — TELEPHONE ENCOUNTER
Pt called in stating what is the next step as she is still having pain on her left side,  to the touch.     Pt would like to make sure she isn't having a abdominal aortic aneurysm or leading up to it.

## 2022-07-29 NOTE — TELEPHONE ENCOUNTER
Notified the pt of the following from the Dr:    She had an ultrasound of the abdomen on June 1st and there was no aneurysm at that time.       Pt VU and would still like to know what could be causing it.

## 2022-09-12 ENCOUNTER — OFFICE VISIT (OUTPATIENT)
Dept: FAMILY MEDICINE | Facility: CLINIC | Age: 62
End: 2022-09-12
Payer: OTHER GOVERNMENT

## 2022-09-12 VITALS
SYSTOLIC BLOOD PRESSURE: 108 MMHG | DIASTOLIC BLOOD PRESSURE: 76 MMHG | HEART RATE: 64 BPM | WEIGHT: 214 LBS | TEMPERATURE: 98 F | OXYGEN SATURATION: 98 % | BODY MASS INDEX: 33.52 KG/M2 | RESPIRATION RATE: 16 BRPM

## 2022-09-12 DIAGNOSIS — Z95.1 S/P CABG X 2: ICD-10-CM

## 2022-09-12 DIAGNOSIS — E78.5 HYPERLIPIDEMIA, UNSPECIFIED HYPERLIPIDEMIA TYPE: ICD-10-CM

## 2022-09-12 DIAGNOSIS — E66.9 OBESITY, UNSPECIFIED CLASSIFICATION, UNSPECIFIED OBESITY TYPE, UNSPECIFIED WHETHER SERIOUS COMORBIDITY PRESENT: ICD-10-CM

## 2022-09-12 DIAGNOSIS — E55.9 VITAMIN D DEFICIENCY: ICD-10-CM

## 2022-09-12 DIAGNOSIS — I25.10 CORONARY ARTERY DISEASE, UNSPECIFIED VESSEL OR LESION TYPE, UNSPECIFIED WHETHER ANGINA PRESENT, UNSPECIFIED WHETHER NATIVE OR TRANSPLANTED HEART: ICD-10-CM

## 2022-09-12 DIAGNOSIS — R07.89 COSTOCHONDRAL CHEST PAIN: ICD-10-CM

## 2022-09-12 DIAGNOSIS — R73.9 HYPERGLYCEMIA: Primary | ICD-10-CM

## 2022-09-12 DIAGNOSIS — Z12.31 ENCOUNTER FOR SCREENING MAMMOGRAM FOR BREAST CANCER: ICD-10-CM

## 2022-09-12 PROCEDURE — 99396 PREV VISIT EST AGE 40-64: CPT | Mod: S$PBB,,, | Performed by: FAMILY MEDICINE

## 2022-09-12 PROCEDURE — 99396 PR PREVENTIVE VISIT,EST,40-64: ICD-10-PCS | Mod: S$PBB,,, | Performed by: FAMILY MEDICINE

## 2022-09-12 PROCEDURE — 99213 OFFICE O/P EST LOW 20 MIN: CPT | Performed by: FAMILY MEDICINE

## 2022-09-12 RX ORDER — MELOXICAM 7.5 MG/1
7.5 TABLET ORAL DAILY
Qty: 30 TABLET | Refills: 5 | Status: SHIPPED | OUTPATIENT
Start: 2022-09-12 | End: 2022-11-28

## 2022-09-12 NOTE — ADDENDUM NOTE
Addended by: VITO MONDRAGON on: 9/12/2022 12:12 PM     Modules accepted: Orders     HPI:  8/27/21,   Time: 11:10 PM EDT       Jeni Catherine is a 21 m.o. female presenting to the ED for fever, beginning 12 hrs ago. The complaint has been persistent, mild in severity, and worsened by nothing. No cough/congestion/runny nose sore throat. Mom thinks cutting teeth. Not pulling at ears. Bib private vehicle    Review of Systems:   Pertinent positives and negatives are stated within HPI, all other systems reviewed and are negative.          --------------------------------------------- PAST HISTORY ---------------------------------------------  Past Medical History:  has no past medical history on file. Past Surgical History:  has no past surgical history on file. Social History:  reports that she has never smoked. She has never used smokeless tobacco.    Family History: family history is not on file. The patients home medications have been reviewed. Allergies: Patient has no known allergies. ---------------------------------------------------PHYSICAL EXAM--------------------------------------    Constitutional/General: Alert and oriented x3, well appearing, non toxic in NAD  Head: Normocephalic and atraumatic, tms nml suly  Eyes: PERRL, EOMI, conjunctive normal, sclera non icteric  Mouth: Oropharynx clear, handling secretions,   Neck: Supple, full ROM,   Respiratory: Lungs clear to auscultation bilaterally, no wheezes, rales, or rhonchi. Not in respiratory distress  Cardiovascular:  Regular rate. Regular rhythm. No murmurs, gallops, or rubs. 2+ distal pulses  Chest: No chest wall tenderness  GI:  Abdomen Soft, Non tender, Non distended. +BS. No organomegaly, no palpable masses,  No rebound, guarding, or rigidity. Musculoskeletal: Moves all extremities x 4. Warm and well perfused, no clubbing, cyanosis, or edema. Capillary refill <3 seconds  Integument: skin warm and dry. No rashes.    Lymphatic: no lymphadenopathy noted  Neurologic: GCS 15, no focal deficits,   Psychiatric: Normal Affect    -------------------------------------------------- RESULTS -------------------------------------------------  I have personally reviewed all laboratory and imaging results for this patient. Results are listed below. LABS:  No results found for this visit on 08/27/21. RADIOLOGY:  Interpreted by Radiologist.  No orders to display       EKG: This EKG is signed and interpreted by the EP. Time:   Rate:   Rhythm:   Interpretation:   Comparison:       ------------------------- NURSING NOTES AND VITALS REVIEWED ---------------------------   The nursing notes within the ED encounter and vital signs as below have been reviewed by myself. Pulse 110   Temp 102 °F (38.9 °C) (Axillary)   SpO2 98%   Oxygen Saturation Interpretation: Normal    The patients available past medical records and past encounters were reviewed. ------------------------------ ED COURSE/MEDICAL DECISION MAKING----------------------  Medications   ibuprofen (ADVIL;MOTRIN) 100 MG/5ML suspension 10 mg/kg (has no administration in time range)         ED COURSE:       Medical Decision Making:        Counseling: The emergency provider has spoken with the patient and discussed todays results, in addition to providing specific details for the plan of care and counseling regarding the diagnosis and prognosis. Questions are answered at this time and they are agreeable with the plan.       --------------------------------- IMPRESSION AND DISPOSITION ---------------------------------    IMPRESSION  1. Fever, unspecified fever cause        DISPOSITION  Disposition: Discharge to home  Patient condition is stable    NOTE: This report was transcribed using voice recognition software.  Every effort was made to ensure accuracy; however, inadvertent computerized transcription errors may be present        Soha Negrete MD  08/27/21 [id number]

## 2022-09-12 NOTE — PROGRESS NOTES
Subjective:       Patient ID: Marie Salcido is a 62 y.o. female.    Chief Complaint: Annual Exam      Patient is here for her annual well visit.  She does relate that she is still having pain in left upper quadrant and has palpable spasm any abdominal wall seems to be positional and musculoskeletal.  Patient Active Problem List:     CAD (coronary artery disease)-     Anomalous coronary artery origin     Anomalous coronary artery communication     Hyperglycemia     S/P CABG x 2     Obesity     Chest pain     Tobacco use     Idiopathic pericarditis     Fatty liver     Neck pain     Acute pain of left shoulder     Left arm weakness     Left arm pain     Left forearm pain     Lumbar strain  Lab Results       Component                Value               Date                       WBC                      4.02                05/23/2022                 HGB                      12.7                05/23/2022                 HCT                      42.8                05/23/2022                 PLT                      281                 05/23/2022                 CHOL                     160                 05/18/2022                 TRIG                     117                 05/18/2022                 HDL                      57                  05/18/2022                 ALT                      23                  05/23/2022                 AST                      20                  05/23/2022                 NA                       137                 05/23/2022                 K                        4.2                 05/23/2022                 CL                       104                 05/23/2022                 CREATININE               0.6                 05/23/2022                 BUN                      10                  05/23/2022                 CO2                      26                  05/23/2022                 TSH                      1.970               08/21/2020                 INR                       1.3                 2022                 HGBA1C                   6.0                 2020                    Allergies and Medications:   Review of patient's allergies indicates:   Allergen Reactions    Opioids - morphine analogues     Codeine Rash     Current Outpatient Medications   Medication Sig Dispense Refill    ascorbic acid, vitamin C, (VITAMIN C) 1000 MG tablet Take 1,000 mg by mouth once daily.      aspirin (ECOTRIN) 81 MG EC tablet Take 81 mg by mouth once daily.      omega-3 fatty acids/fish oil (FISH OIL-OMEGA-3 FATTY ACIDS) 300-1,000 mg capsule Take 1 capsule by mouth once daily.      meloxicam (MOBIC) 7.5 MG tablet Take 1 tablet (7.5 mg total) by mouth once daily. 30 tablet 5    omega 3-dha-epa-fish oil 100-150-750 mg Cap Take 2 capsules by mouth 2 (two) times daily. (Patient not taking: Reported on 2022) 120 capsule 11     No current facility-administered medications for this visit.       Family History:   Family History   Problem Relation Age of Onset    Cancer Maternal Grandmother     Breast cancer Sister     Asthma Sister     Arrhythmia Mother     Diabetes Mellitus Neg Hx        Social History:   Social History     Socioeconomic History    Marital status:    Tobacco Use    Smoking status: Former     Types: Cigarettes     Quit date:      Years since quittin.7    Smokeless tobacco: Never   Substance and Sexual Activity    Alcohol use: Yes     Alcohol/week: 1.0 standard drink     Types: 1 Glasses of wine per week     Comment: socially    Drug use: No    Sexual activity: Yes     Partners: Male       Review of Systems   Constitutional:  Negative for activity change, appetite change, chills, diaphoresis, fatigue, fever and unexpected weight change.   HENT:  Negative for congestion, dental problem, drooling, ear discharge, ear pain, facial swelling, hearing loss, mouth sores, nosebleeds, postnasal drip, rhinorrhea, sinus pressure, sinus pain, sneezing, sore  throat, tinnitus, trouble swallowing and voice change.         Patient felt like she had COVID in early 2020.    Eyes:  Negative for photophobia, pain, discharge, redness, itching and visual disturbance.   Respiratory:  Negative for apnea, cough, choking, chest tightness, shortness of breath, wheezing and stridor.    Cardiovascular:  Negative for chest pain, palpitations and leg swelling.   Gastrointestinal:  Negative for abdominal distention, abdominal pain, anal bleeding, blood in stool, constipation, diarrhea, nausea, rectal pain and vomiting.        Patient has known fatty liver.   Endocrine: Negative for cold intolerance, heat intolerance, polydipsia, polyphagia and polyuria.   Genitourinary:  Negative for decreased urine volume, difficulty urinating, dyspareunia, dysuria, enuresis, flank pain, frequency, genital sores, hematuria, menstrual problem, pelvic pain, urgency, vaginal bleeding, vaginal discharge and vaginal pain.   Musculoskeletal:  Negative for arthralgias, back pain, gait problem, joint swelling, myalgias, neck pain and neck stiffness.   Skin:  Negative for color change, pallor, rash and wound.   Allergic/Immunologic: Negative for environmental allergies, food allergies and immunocompromised state.   Neurological:  Negative for dizziness, tremors, seizures, syncope, facial asymmetry, speech difficulty, weakness, light-headedness, numbness and headaches.   Hematological:  Negative for adenopathy. Does not bruise/bleed easily.   Psychiatric/Behavioral:  Negative for agitation, behavioral problems, confusion, decreased concentration, dysphoric mood, hallucinations, self-injury, sleep disturbance and suicidal ideas. The patient is not nervous/anxious and is not hyperactive.      Objective:     Vitals:    09/12/22 1100   BP: 108/76   Pulse: 64   Resp: 16   Temp: 98.4 °F (36.9 °C)        Physical Exam  Vitals and nursing note reviewed.   Constitutional:       General: She is not in acute distress.      Appearance: Normal appearance. She is well-developed and normal weight. She is not ill-appearing, toxic-appearing or diaphoretic.   HENT:      Head: Normocephalic and atraumatic.      Right Ear: Tympanic membrane, ear canal and external ear normal. There is no impacted cerumen.      Left Ear: Tympanic membrane, ear canal and external ear normal. There is no impacted cerumen.      Nose: Nose normal. No congestion or rhinorrhea.      Mouth/Throat:      Pharynx: No oropharyngeal exudate or posterior oropharyngeal erythema.   Eyes:      General: No scleral icterus.        Right eye: No discharge.         Left eye: No discharge.      Conjunctiva/sclera: Conjunctivae normal.      Pupils: Pupils are equal, round, and reactive to light.   Neck:      Thyroid: No thyromegaly.      Vascular: No carotid bruit or JVD.      Trachea: No tracheal deviation.   Cardiovascular:      Rate and Rhythm: Normal rate and regular rhythm.      Pulses: Normal pulses.      Heart sounds: Normal heart sounds. No murmur heard.    No friction rub. No gallop.   Pulmonary:      Effort: Pulmonary effort is normal. No respiratory distress.      Breath sounds: Normal breath sounds. No stridor. No wheezing, rhonchi or rales.   Chest:      Chest wall: No tenderness.       Abdominal:      General: Abdomen is flat. Bowel sounds are normal. There is no distension.      Palpations: Abdomen is soft. There is no mass.      Tenderness: There is no abdominal tenderness. There is no right CVA tenderness, left CVA tenderness, guarding or rebound.      Hernia: No hernia is present.   Genitourinary:     Vagina: Normal.   Musculoskeletal:         General: No swelling, tenderness, deformity or signs of injury. Normal range of motion.      Cervical back: Normal range of motion and neck supple. No rigidity. No muscular tenderness.      Right lower leg: No edema.      Left lower leg: No edema.   Lymphadenopathy:      Cervical: No cervical adenopathy.   Skin:     General:  Skin is warm and dry.      Capillary Refill: Capillary refill takes less than 2 seconds.      Coloration: Skin is not jaundiced or pale.      Findings: No bruising, erythema, lesion or rash.   Neurological:      Mental Status: She is alert and oriented to person, place, and time.      Cranial Nerves: No cranial nerve deficit.      Sensory: No sensory deficit.      Motor: No weakness or abnormal muscle tone.      Coordination: Coordination normal.      Gait: Gait normal.      Deep Tendon Reflexes: Reflexes are normal and symmetric. Reflexes normal.   Psychiatric:         Behavior: Behavior normal.         Thought Content: Thought content normal.         Judgment: Judgment normal.       Assessment:       1. Hyperglycemia    2. S/P CABG x 2    3. Costochondral chest pain    4. Coronary artery disease, unspecified vessel or lesion type, unspecified whether angina present, unspecified whether native or transplanted heart    5. Obesity, unspecified classification, unspecified obesity type, unspecified whether serious comorbidity present    6. Hyperlipidemia, unspecified hyperlipidemia type    7. Encounter for screening mammogram for breast cancer        Plan:       Marie was seen today for annual exam.    Diagnoses and all orders for this visit:    Hyperglycemia  -     Hemoglobin A1C; Future    S/P CABG x 2    Costochondral chest pain  -     meloxicam (MOBIC) 7.5 MG tablet; Take 1 tablet (7.5 mg total) by mouth once daily.    Coronary artery disease, unspecified vessel or lesion type, unspecified whether angina present, unspecified whether native or transplanted heart    Obesity, unspecified classification, unspecified obesity type, unspecified whether serious comorbidity present    Hyperlipidemia, unspecified hyperlipidemia type  -     TSH; Future    Encounter for screening mammogram for breast cancer  -     Mammo Digital Screening Bilat; Future       Follow up in about 6 months (around 3/12/2023) for follow up HTN,  follow up cholesterol, follow up coronary disease..

## 2022-10-28 ENCOUNTER — TELEPHONE (OUTPATIENT)
Dept: FAMILY MEDICINE | Facility: CLINIC | Age: 62
End: 2022-10-28

## 2022-11-28 ENCOUNTER — HOSPITAL ENCOUNTER (OUTPATIENT)
Dept: RADIOLOGY | Facility: HOSPITAL | Age: 62
Discharge: HOME | End: 2022-11-28
Attending: FAMILY MEDICINE
Payer: OTHER GOVERNMENT

## 2022-11-28 ENCOUNTER — PATIENT MESSAGE (OUTPATIENT)
Dept: FAMILY MEDICINE | Facility: CLINIC | Age: 62
End: 2022-11-28

## 2022-11-28 ENCOUNTER — OFFICE VISIT (OUTPATIENT)
Dept: FAMILY MEDICINE | Facility: CLINIC | Age: 62
End: 2022-11-28
Payer: OTHER GOVERNMENT

## 2022-11-28 VITALS
HEIGHT: 67 IN | BODY MASS INDEX: 34.69 KG/M2 | HEART RATE: 72 BPM | WEIGHT: 221 LBS | SYSTOLIC BLOOD PRESSURE: 129 MMHG | DIASTOLIC BLOOD PRESSURE: 74 MMHG

## 2022-11-28 VITALS — WEIGHT: 214.06 LBS | BODY MASS INDEX: 33.6 KG/M2 | HEIGHT: 67 IN

## 2022-11-28 DIAGNOSIS — Z12.31 ENCOUNTER FOR SCREENING MAMMOGRAM FOR BREAST CANCER: ICD-10-CM

## 2022-11-28 DIAGNOSIS — M54.9 MID BACK PAIN: Primary | ICD-10-CM

## 2022-11-28 PROCEDURE — 77067 SCR MAMMO BI INCL CAD: CPT | Mod: TC,PO

## 2022-11-28 PROCEDURE — 99213 OFFICE O/P EST LOW 20 MIN: CPT | Performed by: INTERNAL MEDICINE

## 2022-11-28 PROCEDURE — 77063 BREAST TOMOSYNTHESIS BI: CPT | Mod: TC,PO

## 2022-11-28 PROCEDURE — 99213 OFFICE O/P EST LOW 20 MIN: CPT | Mod: S$PBB,AQ,, | Performed by: INTERNAL MEDICINE

## 2022-11-28 PROCEDURE — 99213 PR OFFICE/OUTPT VISIT, EST, LEVL III, 20-29 MIN: ICD-10-PCS | Mod: S$PBB,AQ,, | Performed by: INTERNAL MEDICINE

## 2022-11-28 RX ORDER — MELOXICAM 15 MG/1
15 TABLET ORAL DAILY
Qty: 20 TABLET | Refills: 1 | Status: SHIPPED | OUTPATIENT
Start: 2022-11-28 | End: 2023-11-10 | Stop reason: SDUPTHER

## 2022-11-28 RX ORDER — METHOCARBAMOL 750 MG/1
750 TABLET, FILM COATED ORAL 2 TIMES DAILY
Qty: 20 TABLET | Refills: 1 | Status: SHIPPED | OUTPATIENT
Start: 2022-11-28 | End: 2022-12-08

## 2022-11-28 NOTE — PROGRESS NOTES
Subjective:       Patient ID: Marie Salcido is a 62 y.o. female.    Chief Complaint: Flank Pain (X 5 days   right side )    Patient is a 62-year-old female who has been experiencing right flank and right upper back pain.  This has been going on for the last 5 days.  No urinary symptoms.  She states that she was pulling some luggage when she will returning from the airport and might have pulled it the wrong way leading to muscle ache.      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    I have reviewed some imaging procedures in past and recently it seems she has tiny gallstones discovered in her gallbladder but without any evidence of cholecystitis.  Patient was surprised to know about this.  I have advised her that her current pain is not related to gallbladder because of location and also timing.  Gallbladder pain is typically associated with meals.  It is usually located in the right upper quadrant and not in the posterior back though that would be unusual.    No evidence of kidney stones in past.    Flank Pain  This is a new problem. The current episode started in the past 7 days (Five days). The problem occurs constantly. The problem has been waxing and waning since onset. The pain is present in the costovertebral angle. The quality of the pain is described as aching. The pain is moderate. The pain is The same all the time. The symptoms are aggravated by bending, twisting, position and coughing. Pertinent negatives include no bladder incontinence, bowel incontinence, chest pain, dysuria, fever, leg pain, numbness, pelvic pain, tingling or weakness. Risk factors include obesity. She has tried analgesics for the symptoms. The treatment provided no relief.     Past Medical History:   Diagnosis Date    Anemia     Coronary artery disease     Hyperlipidemia     Hypertension     Silent myocardial infarction     Sleep apnea      Social History     Socioeconomic History    Marital  "status:    Tobacco Use    Smoking status: Former     Types: Cigarettes     Quit date:      Years since quittin.9    Smokeless tobacco: Never   Substance and Sexual Activity    Alcohol use: Yes     Alcohol/week: 1.0 standard drink     Types: 1 Glasses of wine per week     Comment: socially    Drug use: No    Sexual activity: Yes     Partners: Male     Past Surgical History:   Procedure Laterality Date    BREAST CYST ASPIRATION      CORONARY ARTERY BYPASS GRAFT  10/4/13    2-vessel CABG LIMA-LAD, left SVG-OM2 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 Parrino [facility]    cyst removed from right breast      LASER LAPAROSCOPY      TUBAL LIGATION       Family History   Problem Relation Age of Onset    Cancer Maternal Grandmother     Breast cancer Sister     Asthma Sister     Arrhythmia Mother     Diabetes Mellitus Neg Hx        Review of Systems   Constitutional:  Negative for fever.   Cardiovascular:  Negative for chest pain.   Gastrointestinal:  Negative for bowel incontinence.   Genitourinary:  Positive for flank pain. Negative for bladder incontinence, dysuria and pelvic pain.   Neurological:  Negative for tingling, weakness and numbness.       Objective:      Blood pressure 129/74, pulse 72, height 5' 7" (1.702 m), weight 100.2 kg (221 lb), last menstrual period 10/04/2013. Body mass index is 34.61 kg/m².  Physical Exam      Assessment:       1. Mid back pain             BONES: No acute bony abnormality or suspicious lytic or blastic lesion. Mild degenerative change in lumbar spine.  Impression      No nephrolithiasis or findings to suggest recent passage of a calculus.   Enlarged heterogeneous appearance of the uterus compatible with multiple uterine fibroids with focal lobular hyperdense lesion, possibly an exophytic fibroid along the right posterior lateral aspect, less likely a right adnexal lesion.  If there are any   symptoms referable to the pelvic region, ultrasound " could be considered for improved sensitivity for evaluation of reproductive organs.         Electronically signed by: CARILTO CHANEY MD   Date: 01/27/14   Time: 11:21            Exam Ended: 01/27/14 11:05 Last Resulted: 01/27/14 11:21             Plan:           Mid back pain  -     meloxicam (MOBIC) 15 MG tablet; Take 1 tablet (15 mg total) by mouth once daily. Take this medication with meals.  Dispense: 20 tablet; Refill: 1  -     methocarbamoL (ROBAXIN) 750 MG Tab; Take 1 tablet (750 mg total) by mouth 2 (two) times a day. Do not drive on this medication. for 10 days  Dispense: 20 tablet; Refill: 1  -     Urinalysis; Future; Expected date: 11/29/2022    Based upon reproduced in of reproduction of pain on movements, I think she has musculoskeletal pain.  She was pulling some luggage when she was coming back from the airport.      She has no rash today at least which could be suggestive of shingles.      I have reviewed her old CT scans and ultrasounds.  Recent ultrasound had shown tiny gallstones which could not be causing her the discomfort.  It does show fatty liver which can also cause some discomfort especially if it is inflamed.  But I doubt this would be the pain from fatty liver.  I have advised her to watch her diet and I will set a challenge for her to lose about 20 lb of weight in the next 6 months.      I have sent her some meloxicam and muscle relaxers.  She should rest for the next 24-48 hours.  Keep hydrated also.  She will let me know through the patient portal in the next 24-48 hours as to how she is doing.    Check urinalysis also at [facility].  Consider the possibility of pyelonephritis though no fevers.  No rash suggestive of shingles.      Current Outpatient Medications:     ascorbic acid, vitamin C, (VITAMIN C) 1000 MG tablet, Take 1,000 mg by mouth once daily., Disp: , Rfl:     aspirin (ECOTRIN) 81 MG EC tablet, Take 81 mg by mouth once daily., Disp: , Rfl:     omega 3-dha-epa-fish oil  100-150-750 mg Cap, Take 2 capsules by mouth 2 (two) times daily., Disp: 120 capsule, Rfl: 11    omega-3 fatty acids/fish oil (FISH OIL-OMEGA-3 FATTY ACIDS) 300-1,000 mg capsule, Take 1 capsule by mouth once daily., Disp: , Rfl:     meloxicam (MOBIC) 15 MG tablet, Take 1 tablet (15 mg total) by mouth once daily. Take this medication with meals., Disp: 20 tablet, Rfl: 1    methocarbamoL (ROBAXIN) 750 MG Tab, Take 1 tablet (750 mg total) by mouth 2 (two) times a day. Do not drive on this medication. for 10 days, Disp: 20 tablet, Rfl: 1

## 2022-11-29 ENCOUNTER — TELEPHONE (OUTPATIENT)
Dept: FAMILY MEDICINE | Facility: CLINIC | Age: 62
End: 2022-11-29

## 2022-11-29 ENCOUNTER — PATIENT MESSAGE (OUTPATIENT)
Dept: FAMILY MEDICINE | Facility: CLINIC | Age: 62
End: 2022-11-29

## 2022-11-29 NOTE — TELEPHONE ENCOUNTER
Spoke to pt about results. Pt verbalized understanding.  Pt also questioned why she was never told she had gallstones from the ultrasound she had. She states that she was told when she came in the office on yesterday because she complained of flank pain.

## 2022-11-29 NOTE — TELEPHONE ENCOUNTER
----- Message from Justen Reyez MD sent at 11/28/2022 11:58 AM CST -----  Results Ok, notify patient.

## 2023-04-14 NOTE — PROGRESS NOTES
@09:50   PATIENT INJECTED WITH MYOVIEW IV FOR STRESS IMAGES.    WALKING/LEXISCAN STRESS        RELEASE NPO STATUS FROM NUCLEAR MEDICINE.     Follow up in 6 weeks with Dr. york

## 2023-04-29 ENCOUNTER — HOSPITAL ENCOUNTER (EMERGENCY)
Facility: HOSPITAL | Age: 63
Discharge: HOME | End: 2023-04-29
Attending: EMERGENCY MEDICINE
Payer: OTHER GOVERNMENT

## 2023-04-29 VITALS
HEART RATE: 70 BPM | TEMPERATURE: 98 F | RESPIRATION RATE: 15 BRPM | OXYGEN SATURATION: 99 % | BODY MASS INDEX: 34.69 KG/M2 | DIASTOLIC BLOOD PRESSURE: 76 MMHG | HEIGHT: 67 IN | WEIGHT: 221 LBS | SYSTOLIC BLOOD PRESSURE: 147 MMHG

## 2023-04-29 DIAGNOSIS — M25.561 ACUTE PAIN OF RIGHT KNEE: ICD-10-CM

## 2023-04-29 DIAGNOSIS — S89.91XA RIGHT KNEE INJURY: Primary | ICD-10-CM
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
## 2023-04-29 PROCEDURE — 99283 EMERGENCY DEPT VISIT LOW MDM: CPT

## 2023-04-29 RX ORDER — DICLOFENAC SODIUM 50 MG/1
50 TABLET, DELAYED RELEASE ORAL 2 TIMES DAILY PRN
Qty: 20 TABLET | Refills: 0 | Status: SHIPPED | OUTPATIENT
Start: 2023-04-29
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
## 2023-05-01 ENCOUNTER — TELEPHONE (OUTPATIENT)
Dept: ORTHOPEDICS | Facility: CLINIC | Age: 63
End: 2023-05-01
Payer: OTHER GOVERNMENT

## 2023-05-03 ENCOUNTER — OFFICE VISIT (OUTPATIENT)
Dept: ORTHOPEDICS | Facility: CLINIC | Age: 63
End: 2023-05-03
Payer: OTHER GOVERNMENT

## 2023-05-03 DIAGNOSIS — M25.561 ACUTE PAIN OF RIGHT KNEE: ICD-10-CM

## 2023-05-03 DIAGNOSIS — S89.91XA RIGHT KNEE INJURY: ICD-10-CM

## 2023-05-03 PROCEDURE — 99999 PR PBB SHADOW E&M-EST. PATIENT-LVL III: CPT | Mod: PBBFAC,,, | Performed by: ORTHOPAEDIC SURGERY

## 2023-05-03 PROCEDURE — 99213 PR OFFICE/OUTPT VISIT, EST, LEVL III, 20-29 MIN: ICD-10-PCS | Mod: S$PBB,,, | Performed by: ORTHOPAEDIC SURGERY

## 2023-05-03 PROCEDURE — 99999 PR PBB SHADOW E&M-EST. PATIENT-LVL III: ICD-10-PCS | Mod: PBBFAC,,, | Performed by: ORTHOPAEDIC SURGERY

## 2023-05-03 PROCEDURE — 99213 OFFICE O/P EST LOW 20 MIN: CPT | Mod: PBBFAC,PN | Performed by: ORTHOPAEDIC SURGERY

## 2023-05-03 PROCEDURE — 99213 OFFICE O/P EST LOW 20 MIN: CPT | Mod: S$PBB,,, | Performed by: ORTHOPAEDIC SURGERY

## 2023-05-03 RX ORDER — MELOXICAM 15 MG/1
15 TABLET ORAL DAILY
Qty: 30 TABLET | Refills: 1 | Status: SHIPPED | OUTPATIENT
Start: 2023-05-03 | End: 2024-03-06
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
## 2023-07-24 ENCOUNTER — TELEPHONE (OUTPATIENT)
Dept: FAMILY MEDICINE | Facility: CLINIC | Age: 63
End: 2023-07-24

## 2023-07-24 DIAGNOSIS — R10.2 PELVIC PAIN: Primary | ICD-10-CM

## 2023-07-24 LAB
PAP RECOMMENDATION EXT: NORMAL
PAP SMEAR: NORMAL

## 2023-07-24 NOTE — TELEPHONE ENCOUNTER
----- Message from Che Payton sent at 7/24/2023  8:09 AM CDT -----  Regarding: Referral for Gynocologist  Patient states she has left messages but has not heard back. She said her appointment is for 10:00 am today 7/24/23. Please contact patient ASAP to advise.     Thank You

## 2023-07-25 ENCOUNTER — TELEPHONE (OUTPATIENT)
Dept: FAMILY MEDICINE | Facility: CLINIC | Age: 63
End: 2023-07-25

## 2023-08-03 ENCOUNTER — TELEPHONE (OUTPATIENT)
Dept: FAMILY MEDICINE | Facility: CLINIC | Age: 63
End: 2023-08-03

## 2023-08-03 NOTE — TELEPHONE ENCOUNTER
----- Message from Estela Gonzales sent at 8/3/2023  9:35 AM CDT -----  Regarding: Unable to Contact - ###  We have made several attempts to contact this patient to schedule their US Pelvis Complete Non OB and have been unable to reach them. We will be removing this order from our work queue but wanted to make you aware in case you wanted to reach out to the patient.     Thanks,   [facility] Centralized Scheduling

## 2023-10-11 ENCOUNTER — PATIENT OUTREACH (OUTPATIENT)
Dept: ADMINISTRATIVE | Facility: HOSPITAL | Age: 63
End: 2023-10-11
Payer: OTHER GOVERNMENT

## 2023-10-17 ENCOUNTER — OFFICE VISIT (OUTPATIENT)
Dept: ORTHOPEDICS | Facility: CLINIC | Age: 63
End: 2023-10-17
Payer: OTHER GOVERNMENT

## 2023-10-17 VITALS — RESPIRATION RATE: 16 BRPM | HEIGHT: 67 IN | BODY MASS INDEX: 34.69 KG/M2 | WEIGHT: 221 LBS
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
## 2023-10-17 DIAGNOSIS — M25.561 ACUTE PAIN OF RIGHT KNEE: ICD-10-CM

## 2023-10-17 DIAGNOSIS — M54.9 DORSALGIA, UNSPECIFIED: Primary | ICD-10-CM

## 2023-10-17 PROCEDURE — 99214 PR OFFICE/OUTPT VISIT, EST, LEVL IV, 30-39 MIN: ICD-10-PCS | Mod: S$PBB,,, | Performed by: ORTHOPAEDIC SURGERY

## 2023-10-17 PROCEDURE — 99214 OFFICE O/P EST MOD 30 MIN: CPT | Mod: S$PBB,,, | Performed by: ORTHOPAEDIC SURGERY

## 2023-10-17 PROCEDURE — 99999 PR PBB SHADOW E&M-EST. PATIENT-LVL III: ICD-10-PCS | Mod: PBBFAC,,, | Performed by: ORTHOPAEDIC SURGERY

## 2023-10-17 PROCEDURE — 99999 PR PBB SHADOW E&M-EST. PATIENT-LVL III: CPT | Mod: PBBFAC,,, | Performed by: ORTHOPAEDIC SURGERY

## 2023-10-17 PROCEDURE — 99213 OFFICE O/P EST LOW 20 MIN: CPT | Mod: PBBFAC,PO | Performed by: ORTHOPAEDIC SURGERY
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
## 2023-10-19 ENCOUNTER — TELEPHONE (OUTPATIENT)
Dept: FAMILY MEDICINE | Facility: CLINIC | Age: 63
End: 2023-10-19

## 2023-10-19 ENCOUNTER — TELEPHONE (OUTPATIENT)
Dept: ORTHOPEDICS | Facility: CLINIC | Age: 63
End: 2023-10-19
Payer: OTHER GOVERNMENT

## 2023-10-19 NOTE — TELEPHONE ENCOUNTER
Pt called in and stated orthor told her that her back pain was not from her knee but from her back. Told her she needed a referral for back and spine.    Patient baseline mental status

## 2023-10-19 NOTE — TELEPHONE ENCOUNTER
Pt called back in and stated she wants to switch to you as her pcp. I advised she would need approval. Please advise.

## 2023-10-24 ENCOUNTER — TELEPHONE (OUTPATIENT)
Dept: FAMILY MEDICINE | Facility: CLINIC | Age: 63
End: 2023-10-24

## 2023-10-24 DIAGNOSIS — M54.9 DORSALGIA, UNSPECIFIED: ICD-10-CM

## 2023-10-24 DIAGNOSIS — M54.9 MID BACK PAIN: Primary | ICD-10-CM

## 2023-10-24 NOTE — TELEPHONE ENCOUNTER
Pt called us stating that her MRI needed authorization. Can someone please reach out to pt once authorization is obtained. Thank you.

## 2023-10-25 ENCOUNTER — TELEPHONE (OUTPATIENT)
Dept: ORTHOPEDICS | Facility: CLINIC | Age: 63
End: 2023-10-25
Payer: OTHER GOVERNMENT

## 2023-10-25 NOTE — TELEPHONE ENCOUNTER
----- Message from Kerry Rascon sent at 10/25/2023 12:02 PM CDT -----  Regarding: advise  Contact: pt  Type: Needs Medical Advice  Who Called:  patient  Symptoms (please be specific):  back order  How long has patient had these symptoms:    Pharmacy name and phone #:    Best Call Back Number: [phone]    Additional Information: corina told pt to reach out to provider for a back order; please call to advise thanks!

## 2023-10-25 NOTE — TELEPHONE ENCOUNTER
----- Message from Alex Sandhu sent at 10/25/2023 12:33 PM CDT -----  Regarding: issue wit [facility] orders, call pt   Contact: pt   issue wit [facility] orders, call pt

## 2023-10-25 NOTE — TELEPHONE ENCOUNTER
Pt advised that any order to back and spine will need to come from her PCP as we saw her for the knee only. Advised pt that we do not treat back issues. Pt will call her PCP and schedule appt to discuss needed orders.

## 2023-10-26 NOTE — TELEPHONE ENCOUNTER
Pt called back again and expressed that she does not want to see Dr. Reyez again and that she really valued how thorough you were and would really appreciate if you would consider taking her on as a new patient.

## 2023-11-01 ENCOUNTER — TELEPHONE (OUTPATIENT)
Dept: FAMILY MEDICINE | Facility: CLINIC | Age: 63
End: 2023-11-01

## 2023-11-01 NOTE — TELEPHONE ENCOUNTER
Discussed with patient by telephone today have not seen her in nearly a year and her main problem was that she could not get an appointment with me before December 6th.  I will contact the front office let the node to work her in  next week.  She will need to be seen by me before referral to the appropriate specialist physical therapy and/or MRI.

## 2023-11-01 NOTE — TELEPHONE ENCOUNTER
Patient states she is having back pain. Could not get an appt until 12-6-23 for eval. From you.  She would like a phone call from you, please.

## 2023-11-06 ENCOUNTER — OFFICE VISIT (OUTPATIENT)
Dept: FAMILY MEDICINE | Facility: CLINIC | Age: 63
End: 2023-11-06
Payer: OTHER GOVERNMENT

## 2023-11-06 VITALS
BODY MASS INDEX: 33.43 KG/M2 | RESPIRATION RATE: 18 BRPM | HEIGHT: 67 IN | TEMPERATURE: 99 F | WEIGHT: 213 LBS | DIASTOLIC BLOOD PRESSURE: 78 MMHG | SYSTOLIC BLOOD PRESSURE: 124 MMHG

## 2023-11-06 DIAGNOSIS — M54.9 DORSALGIA, UNSPECIFIED: ICD-10-CM

## 2023-11-06 DIAGNOSIS — M54.9 MID BACK PAIN: Primary | ICD-10-CM

## 2023-11-06 PROCEDURE — 99213 OFFICE O/P EST LOW 20 MIN: CPT | Performed by: FAMILY MEDICINE

## 2023-11-06 PROCEDURE — 99214 PR OFFICE/OUTPT VISIT, EST, LEVL IV, 30-39 MIN: ICD-10-PCS | Mod: S$PBB,AQ,, | Performed by: FAMILY MEDICINE

## 2023-11-06 PROCEDURE — 99214 OFFICE O/P EST MOD 30 MIN: CPT | Mod: S$PBB,AQ,, | Performed by: FAMILY MEDICINE

## 2023-11-06 RX ORDER — CYCLOBENZAPRINE HCL 5 MG
5 TABLET ORAL NIGHTLY
Qty: 10 TABLET | Refills: 0 | Status: SHIPPED | OUTPATIENT
Start: 2023-11-06 | End: 2023-11-16
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
## 2023-11-07 ENCOUNTER — TELEPHONE (OUTPATIENT)
Dept: FAMILY MEDICINE | Facility: CLINIC | Age: 63
End: 2023-11-07
Payer: OTHER GOVERNMENT

## 2023-11-08 ENCOUNTER — TELEPHONE (OUTPATIENT)
Dept: FAMILY MEDICINE | Facility: CLINIC | Age: 63
End: 2023-11-08
Payer: OTHER GOVERNMENT

## 2023-11-10 DIAGNOSIS — M54.9 MID BACK PAIN: ICD-10-CM

## 2023-11-12 RX ORDER — MELOXICAM 15 MG/1
15 TABLET ORAL DAILY
Qty: 30 TABLET | Refills: 5 | Status: SHIPPED | OUTPATIENT
Start: 2023-11-12 | End: 2024-05-10

## 2023-12-06 ENCOUNTER — OFFICE VISIT (OUTPATIENT)
Dept: SPINE | Facility: CLINIC | Age: 63
End: 2023-12-06
Payer: OTHER GOVERNMENT

## 2023-12-06 VITALS — HEIGHT: 67 IN | WEIGHT: 212.94 LBS | BODY MASS INDEX: 33.42 KG/M2

## 2023-12-06 DIAGNOSIS — Z12.31 OTHER SCREENING MAMMOGRAM: ICD-10-CM

## 2023-12-06 DIAGNOSIS — M54.41 CHRONIC BILATERAL LOW BACK PAIN WITH BILATERAL SCIATICA: Primary | ICD-10-CM

## 2023-12-06 DIAGNOSIS — M54.9 DORSALGIA, UNSPECIFIED: ICD-10-CM

## 2023-12-06 DIAGNOSIS — M54.42 CHRONIC BILATERAL LOW BACK PAIN WITH BILATERAL SCIATICA: Primary | ICD-10-CM

## 2023-12-06 DIAGNOSIS — G89.29 CHRONIC BILATERAL LOW BACK PAIN WITH BILATERAL SCIATICA: Primary | ICD-10-CM

## 2023-12-06 DIAGNOSIS — M54.9 MID BACK PAIN: ICD-10-CM

## 2023-12-06 PROCEDURE — 99204 PR OFFICE/OUTPT VISIT, NEW, LEVL IV, 45-59 MIN: ICD-10-PCS | Mod: S$GLB,,, | Performed by: PHYSICAL MEDICINE & REHABILITATION

## 2023-12-06 PROCEDURE — 99204 OFFICE O/P NEW MOD 45 MIN: CPT | Mod: S$GLB,,, | Performed by: PHYSICAL MEDICINE & REHABILITATION
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
## 2024-03-06 ENCOUNTER — OFFICE VISIT (OUTPATIENT)
Dept: FAMILY MEDICINE | Facility: CLINIC | Age: 64
End: 2024-03-06
Payer: OTHER GOVERNMENT

## 2024-03-06 VITALS
DIASTOLIC BLOOD PRESSURE: 78 MMHG | WEIGHT: 214 LBS | HEIGHT: 67 IN | HEART RATE: 85 BPM | RESPIRATION RATE: 18 BRPM | OXYGEN SATURATION: 98 % | TEMPERATURE: 99 F | BODY MASS INDEX: 33.59 KG/M2 | SYSTOLIC BLOOD PRESSURE: 128 MMHG

## 2024-03-06 DIAGNOSIS — I25.10 CORONARY ARTERY DISEASE, UNSPECIFIED VESSEL OR LESION TYPE, UNSPECIFIED WHETHER ANGINA PRESENT, UNSPECIFIED WHETHER NATIVE OR TRANSPLANTED HEART: Primary | ICD-10-CM

## 2024-03-06 DIAGNOSIS — Z77.098 EXPOSURE TO CHEMICAL INHALATION: ICD-10-CM

## 2024-03-06 DIAGNOSIS — Z23 IMMUNIZATION DUE: ICD-10-CM

## 2024-03-06 DIAGNOSIS — R10.9 ABDOMINAL PAIN, UNSPECIFIED ABDOMINAL LOCATION: ICD-10-CM

## 2024-03-06 DIAGNOSIS — R10.12 LEFT UPPER QUADRANT PAIN: ICD-10-CM

## 2024-03-06 PROCEDURE — 99215 OFFICE O/P EST HI 40 MIN: CPT | Mod: PBBFAC,PN | Performed by: FAMILY MEDICINE

## 2024-03-06 PROCEDURE — 99999 PR PBB SHADOW E&M-EST. PATIENT-LVL V: CPT | Mod: PBBFAC,,, | Performed by: FAMILY MEDICINE

## 2024-03-06 PROCEDURE — 99214 OFFICE O/P EST MOD 30 MIN: CPT | Mod: S$PBB,AQ,, | Performed by: FAMILY MEDICINE

## 2024-03-06 RX ORDER — PANTOPRAZOLE SODIUM 40 MG/1
40 TABLET, DELAYED RELEASE ORAL DAILY
Qty: 30 TABLET | Refills: 11 | Status: SHIPPED | OUTPATIENT
Start: 2024-03-06 | End: 2025-03-06
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
## 2024-03-07 ENCOUNTER — LAB VISIT (OUTPATIENT)
Dept: LAB | Facility: HOSPITAL | Age: 64
End: 2024-03-07
Attending: FAMILY MEDICINE
Payer: OTHER GOVERNMENT

## 2024-03-07 DIAGNOSIS — R10.9 ABDOMINAL PAIN, UNSPECIFIED ABDOMINAL LOCATION: ICD-10-CM

## 2024-03-07 LAB
ALBUMIN SERPL BCP-MCNC: 3.8 G/DL (ref 3.5–5.2)
ALP SERPL-CCNC: 51 U/L (ref 55–135)
ALT SERPL W/O P-5'-P-CCNC: 18 U/L (ref 10–44)
ANION GAP SERPL CALC-SCNC: 11 MMOL/L (ref 8–16)
AST SERPL-CCNC: 18 U/L (ref 10–40)
BASOPHILS # BLD AUTO: 0.04 K/UL (ref 0–0.2)
BASOPHILS NFR BLD: 0.8 % (ref 0–1.9)
BILIRUB SERPL-MCNC: 0.4 MG/DL (ref 0.1–1)
BUN SERPL-MCNC: 10 MG/DL (ref 8–23)
CALCIUM SERPL-MCNC: 9 MG/DL (ref 8.7–10.5)
CHLORIDE SERPL-SCNC: 105 MMOL/L (ref 95–110)
CO2 SERPL-SCNC: 23 MMOL/L (ref 23–29)
CREAT SERPL-MCNC: 0.8 MG/DL (ref 0.5–1.4)
CREAT SERPL-MCNC: 0.8 MG/DL (ref 0.5–1.4)
DIFFERENTIAL METHOD BLD: ABNORMAL
EOSINOPHIL # BLD AUTO: 0.2 K/UL (ref 0–0.5)
EOSINOPHIL NFR BLD: 3.1 % (ref 0–8)
ERYTHROCYTE [DISTWIDTH] IN BLOOD BY AUTOMATED COUNT: 17.2 % (ref 11.5–14.5)
EST. GFR  (NO RACE VARIABLE): >60 ML/MIN/1.73 M^2
EST. GFR  (NO RACE VARIABLE): >60 ML/MIN/1.73 M^2
GLUCOSE SERPL-MCNC: 90 MG/DL (ref 70–110)
HCT VFR BLD AUTO: 44.7 % (ref 37–48.5)
HGB BLD-MCNC: 13.1 G/DL (ref 12–16)
IMM GRANULOCYTES # BLD AUTO: 0.01 K/UL (ref 0–0.04)
IMM GRANULOCYTES NFR BLD AUTO: 0.2 % (ref 0–0.5)
LIPASE SERPL-CCNC: 17 U/L (ref 4–60)
LYMPHOCYTES # BLD AUTO: 1.6 K/UL (ref 1–4.8)
LYMPHOCYTES NFR BLD: 34.1 % (ref 18–48)
MCH RBC QN AUTO: 20.2 PG (ref 27–31)
MCHC RBC AUTO-ENTMCNC: 29.3 G/DL (ref 32–36)
MCV RBC AUTO: 69 FL (ref 82–98)
MONOCYTES # BLD AUTO: 0.4 K/UL (ref 0.3–1)
MONOCYTES NFR BLD: 8.6 % (ref 4–15)
NEUTROPHILS # BLD AUTO: 2.5 K/UL (ref 1.8–7.7)
NEUTROPHILS NFR BLD: 53.2 % (ref 38–73)
NRBC BLD-RTO: 0 /100 WBC
PLATELET # BLD AUTO: 303 K/UL (ref 150–450)
PMV BLD AUTO: 11.1 FL (ref 9.2–12.9)
POTASSIUM SERPL-SCNC: 4.2 MMOL/L (ref 3.5–5.1)
PROT SERPL-MCNC: 7.2 G/DL (ref 6–8.4)
RBC # BLD AUTO: 6.49 M/UL (ref 4–5.4)
SODIUM SERPL-SCNC: 139 MMOL/L (ref 136–145)
WBC # BLD AUTO: 4.78 K/UL (ref 3.9–12.7)

## 2024-03-07 PROCEDURE — 83690 ASSAY OF LIPASE: CPT | Performed by: FAMILY MEDICINE

## 2024-03-07 PROCEDURE — 36415 COLL VENOUS BLD VENIPUNCTURE: CPT | Performed by: FAMILY MEDICINE

## 2024-03-07 PROCEDURE — 80053 COMPREHEN METABOLIC PANEL: CPT | Performed by: FAMILY MEDICINE

## 2024-03-07 PROCEDURE — 85025 COMPLETE CBC W/AUTO DIFF WBC: CPT | Performed by: FAMILY MEDICINE

## 2024-03-08 ENCOUNTER — TELEPHONE (OUTPATIENT)
Dept: FAMILY MEDICINE | Facility: CLINIC | Age: 64
End: 2024-03-08
Payer: OTHER GOVERNMENT

## 2024-03-08 DIAGNOSIS — R71.8 RBC MICROCYTOSIS: Primary | ICD-10-CM

## 2024-03-08 NOTE — TELEPHONE ENCOUNTER
----- Message from Justen Reyez MD sent at 3/8/2024  8:01 AM CST -----  Iron deficiency I would recommend a multivitamin with iron and recheck the numbers in 3 months

## 2024-03-16 ENCOUNTER — HOSPITAL ENCOUNTER (EMERGENCY)
Facility: HOSPITAL | Age: 64
Discharge: HOME | End: 2024-03-17
Attending: EMERGENCY MEDICINE
Payer: OTHER GOVERNMENT

## 2024-03-16 DIAGNOSIS — R00.2 PALPITATIONS: ICD-10-CM

## 2024-03-16 DIAGNOSIS — R20.2 ARM PARESTHESIA, RIGHT: ICD-10-CM

## 2024-03-16 DIAGNOSIS — R20.2 NUMBNESS AND TINGLING OF RIGHT FACE: Primary | ICD-10-CM

## 2024-03-16 DIAGNOSIS — R20.0 NUMBNESS AND TINGLING OF RIGHT FACE: Primary | ICD-10-CM

## 2024-03-16 DIAGNOSIS — R07.9 CHEST PAIN: ICD-10-CM

## 2024-03-16 DIAGNOSIS — H57.11 ACUTE RIGHT EYE PAIN: ICD-10-CM

## 2024-03-16 DIAGNOSIS — R20.2 RIGHT LEG PARESTHESIAS: ICD-10-CM

## 2024-03-16 LAB
ALBUMIN SERPL BCP-MCNC: 4.2 G/DL (ref 3.5–5.2)
ALP SERPL-CCNC: 43 U/L (ref 55–135)
ALT SERPL W/O P-5'-P-CCNC: 16 U/L (ref 10–44)
ANION GAP SERPL CALC-SCNC: 4 MMOL/L (ref 8–16)
AST SERPL-CCNC: 15 U/L (ref 10–40)
BASOPHILS # BLD AUTO: 0.05 K/UL (ref 0–0.2)
BASOPHILS NFR BLD: 0.8 % (ref 0–1.9)
BILIRUB SERPL-MCNC: 0.3 MG/DL (ref 0.1–1)
BNP SERPL-MCNC: 21 PG/ML (ref 0–99)
BUN SERPL-MCNC: 14 MG/DL (ref 8–23)
CALCIUM SERPL-MCNC: 8.4 MG/DL (ref 8.7–10.5)
CHLORIDE SERPL-SCNC: 106 MMOL/L (ref 95–110)
CO2 SERPL-SCNC: 28 MMOL/L (ref 23–29)
CREAT SERPL-MCNC: 0.7 MG/DL (ref 0.5–1.4)
DIFFERENTIAL METHOD BLD: ABNORMAL
EOSINOPHIL # BLD AUTO: 0.2 K/UL (ref 0–0.5)
EOSINOPHIL NFR BLD: 2.8 % (ref 0–8)
ERYTHROCYTE [DISTWIDTH] IN BLOOD BY AUTOMATED COUNT: 16.8 % (ref 11.5–14.5)
EST. GFR  (NO RACE VARIABLE): >60 ML/MIN/1.73 M^2
GLUCOSE SERPL-MCNC: 96 MG/DL (ref 70–110)
HCT VFR BLD AUTO: 39.7 % (ref 37–48.5)
HGB BLD-MCNC: 12 G/DL (ref 12–16)
IMM GRANULOCYTES # BLD AUTO: 0.01 K/UL (ref 0–0.04)
IMM GRANULOCYTES NFR BLD AUTO: 0.2 % (ref 0–0.5)
INR PPP: 1 (ref 0.8–1.2)
LYMPHOCYTES # BLD AUTO: 2.1 K/UL (ref 1–4.8)
LYMPHOCYTES NFR BLD: 34.3 % (ref 18–48)
MAGNESIUM SERPL-MCNC: 2.2 MG/DL (ref 1.6–2.6)
MCH RBC QN AUTO: 20.4 PG (ref 27–31)
MCHC RBC AUTO-ENTMCNC: 30.2 G/DL (ref 32–36)
MCV RBC AUTO: 67 FL (ref 82–98)
MONOCYTES # BLD AUTO: 0.5 K/UL (ref 0.3–1)
MONOCYTES NFR BLD: 7.9 % (ref 4–15)
NEUTROPHILS # BLD AUTO: 3.3 K/UL (ref 1.8–7.7)
NEUTROPHILS NFR BLD: 54 % (ref 38–73)
NRBC BLD-RTO: 0 /100 WBC
PLATELET # BLD AUTO: 276 K/UL (ref 150–450)
PMV BLD AUTO: 10.1 FL (ref 9.2–12.9)
POTASSIUM SERPL-SCNC: 3.6 MMOL/L (ref 3.5–5.1)
PROT SERPL-MCNC: 7.4 G/DL (ref 6–8.4)
PROTHROMBIN TIME: 11.4 SEC (ref 9–12.5)
RBC # BLD AUTO: 5.89 M/UL (ref 4–5.4)
SODIUM SERPL-SCNC: 138 MMOL/L (ref 136–145)
TROPONIN I SERPL HS-MCNC: 6.7 PG/ML (ref 0–14.9)
WBC # BLD AUTO: 6.06 K/UL (ref 3.9–12.7)

## 2024-03-16 PROCEDURE — 93010 ELECTROCARDIOGRAM REPORT: CPT | Mod: ,,, | Performed by: INTERNAL MEDICINE

## 2024-03-16 PROCEDURE — 93005 ELECTROCARDIOGRAM TRACING: CPT | Performed by: INTERNAL MEDICINE

## 2024-03-16 PROCEDURE — 83735 ASSAY OF MAGNESIUM: CPT | Performed by: EMERGENCY MEDICINE

## 2024-03-16 PROCEDURE — 83880 ASSAY OF NATRIURETIC PEPTIDE: CPT | Performed by: EMERGENCY MEDICINE

## 2024-03-16 PROCEDURE — 25000003 PHARM REV CODE 250: Performed by: STUDENT IN AN ORGANIZED HEALTH CARE EDUCATION/TRAINING PROGRAM

## 2024-03-16 PROCEDURE — 85025 COMPLETE CBC W/AUTO DIFF WBC: CPT | Performed by: EMERGENCY MEDICINE

## 2024-03-16 PROCEDURE — 84484 ASSAY OF TROPONIN QUANT: CPT | Performed by: EMERGENCY MEDICINE

## 2024-03-16 PROCEDURE — 85610 PROTHROMBIN TIME: CPT | Performed by: EMERGENCY MEDICINE

## 2024-03-16 PROCEDURE — 99285 EMERGENCY DEPT VISIT HI MDM: CPT | Mod: 25

## 2024-03-16 PROCEDURE — 80053 COMPREHEN METABOLIC PANEL: CPT | Performed by: EMERGENCY MEDICINE

## 2024-03-16 RX ORDER — TETRACAINE HYDROCHLORIDE 5 MG/ML
2 SOLUTION OPHTHALMIC
Status: DISCONTINUED | OUTPATIENT
Start: 2024-03-16 | End: 2024-03-17 | Stop reason: HOSPADM

## 2024-03-17 VITALS
OXYGEN SATURATION: 99 % | HEART RATE: 75 BPM | TEMPERATURE: 99 F | SYSTOLIC BLOOD PRESSURE: 135 MMHG | RESPIRATION RATE: 18 BRPM | DIASTOLIC BLOOD PRESSURE: 96 MMHG

## 2024-03-17 LAB — TROPONIN I SERPL HS-MCNC: 6.2 PG/ML (ref 0–14.9)

## 2024-03-17 PROCEDURE — 84484 ASSAY OF TROPONIN QUANT: CPT | Performed by: EMERGENCY MEDICINE

## 2024-03-17 NOTE — ED PROVIDER NOTES
Encounter Date: 3/16/2024       History     Chief Complaint   Patient presents with    facial numbness     Patient reports started yesterday am     Palpitations     HPI  Pt is a 64 y/o F with a pmh of CAD, HTN, HLD who presents with palpitations, right facial numbness and tingling, R eye pain. She denies shortness of breath, chest pain, abdominal pain, nausea, vomiting, headache.    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 Parrino [facility]    cyst removed from right breast      LASER LAPAROSCOPY      TUBAL LIGATION       Family History   Problem Relation Age of Onset    Cancer Maternal Grandmother     Breast cancer Sister     Asthma Sister     Arrhythmia Mother     Diabetes Mellitus Neg Hx      Social History     Tobacco Use    Smoking status: Former     Current packs/day: 0.00     Types: Cigarettes     Quit date:      Years since quittin.2    Smokeless tobacco: Never   Substance Use Topics    Alcohol use: Yes     Alcohol/week: 1.0 standard drink of alcohol     Types: 1 Glasses of wine per week     Comment: socially    Drug use: No     Review of Systems   All other systems reviewed and are negative.      Physical Exam     Initial Vitals [24]   BP Pulse Resp Temp SpO2   (!) 185/100 73 18 98.5 °F (36.9 °C) 100 %      MAP       --         Physical Exam    Nursing note and vitals reviewed.  Constitutional: She is not diaphoretic.  Non-toxic  appearance.   HENT:   Head: Normocephalic and atraumatic.   Mouth/Throat: Oropharynx is clear and moist.   Eyes: Conjunctivae and EOM are normal. No scleral icterus.   Slit lamp exam:       The right eye shows no fluorescein uptake.        The left eye shows no fluorescein uptake.   IOP left eye 19, right eye 20   Neck: Neck supple.   Normal range of motion.  Cardiovascular:  Normal rate, regular rhythm and normal heart sounds.           Pulmonary/Chest: Breath sounds normal. No respiratory distress.   Abdominal: Abdomen is soft. She exhibits no distension. There is no abdominal tenderness. There is no guarding.   Musculoskeletal:      Cervical back: Normal range of motion and neck supple.     Neurological: She is alert and oriented to person, place, and time.   Diminished sensation to light touch in right upper and lower extremity. Strength 5/5 in all extremities. CN 2-12 intact. No pronator drift. Finger to nose, heel to shin intact. Gait normal.   Skin: Skin is warm and dry.   Psychiatric: She has a normal mood and affect. Thought content normal.         ED Course   Procedures  Labs Reviewed   CBC W/ AUTO DIFFERENTIAL - Abnormal; Notable for the following components:       Result Value    RBC 5.89 (*)     MCV 67 (*)     MCH 20.4 (*)     MCHC 30.2 (*)     RDW 16.8 (*)     All other components within normal limits   COMPREHENSIVE METABOLIC PANEL - Abnormal; Notable for the following components:    Calcium 8.4 (*)     Alkaline Phosphatase 43 (*)     Anion Gap 4 (*)     All other components within normal limits   B-TYPE NATRIURETIC PEPTIDE   MAGNESIUM   TROPONIN I HIGH SENSITIVITY   PROTIME-INR   TROPONIN I HIGH SENSITIVITY        ECG Results              EKG 12-lead (In process)        Collection Time Result Time QRS Duration OHS QTC Calculation    03/16/24 20:44:06 03/17/24 05:08:04 82 425                     In process by Interface, Lab In [facility] (03/17/24 05:08:08)                   Narrative:    Test Reason :  R07.9,    Vent. Rate : 063 BPM     Atrial Rate : 063 BPM     P-R Int : 192 ms          QRS Dur : 082 ms      QT Int : 416 ms       P-R-T Axes : 049 056 064 degrees     QTc Int : 425 ms    Normal sinus rhythm  Normal ECG  When compared with ECG of 24-MAY-2022 10:41,  No significant change was found    Referred By: [email or web address]   SELF           Confirmed By:                                   Imaging Results              CT Head Without Contrast (Final result)  Result time 03/16/24 23:04:02      Final result by Marie Bravo MD (03/16/24 23:04:02)                   Narrative:    EXAM:  CT Head Without Intravenous Contrast    CLINICAL HISTORY:  The patient is 63 years old and is Female; Neuro deficit, acute, stroke suspected; right facial tingling and numbness yesterday, decreased sensation to right upper and lower extremities    TECHNIQUE:  Axial computed tomography images of the head/brain without intravenous contrast.  Sagittal and coronal reformatted images were created and reviewed.  This CT exam was performed using one or more of the following dose reduction techniques:  automated exposure control, adjustment of the mA and/or kV according to patient size, and/or use of iterative reconstruction technique.    COMPARISON:  CT Head dated 08/20/2020    FINDINGS:  BRAIN:  Brain volume within normal limits for patient age. Minimal periventricular and deep white matter low-attenuation nonspecific but likely reflecting chronic small vessel ischemic disease.  No hemorrhage.  VENTRICLES:  Unremarkable.  No ventriculomegaly.  BONES/JOINTS:  Unremarkable.  No acute fracture.  SOFT TISSUES:  Unremarkable.  SINUSES:  Scattered mucosal thickening throughout the paranasal sinuses with small air-fluid level in the partially visualized right maxillary sinus.  MASTOID AIR CELLS:  Unremarkable as visualized.  No mastoid effusion.    IMPRESSION:  No acute intracranial findings.    Scattered mucosal thickening throughout the  paranasal sinuses with small air-fluid level in the partially visualized right maxillary sinus. Clinical correlation for symptoms of acute sinusitis recommended.    Electronically signed by:  Marie Bravo MD  03/16/2024 11:04 PM CDT Workstation: [id number]                                     X-Ray Chest PA And Lateral (Final result)  Result time 03/17/24 08:12:48   Procedure changed from X-Ray Chest AP Portable     Final result by Dorian Bell MD (03/17/24 08:12:48)                   Narrative:    XR CHEST 2 VIEWS    CLINICAL HISTORY:  63 years Female chest pain    COMPARISON: May 23, 2022    FINDINGS: Cardiomediastinal silhouette is stable compared to prior. Status post median sternotomy. Lungs are hyperexpanded with no airspace consolidation. No pleural effusion or pneumothorax. No acute osseous abnormality.    IMPRESSION: No acute pulmonary process.    Electronically signed by:  Dorian Bell MD  03/17/2024 08:12 AM CDT Workstation: [id number]                                     Medications - No data to display    Medical Decision Making  Pt is a 62 y/o F with a pmh of CAD, HTN, HLD who presents with palpitations, right facial numbness and tingling, R eye pain. She is non-toxic appearing, hemodynamically stable and in no respiratory distress, afebrile. CT head without acute abnormality. CXR without acute abnormality on my independent interpretation. EKG shows NSR without STEMI or arrhythmogenic changes on my independent interpretation. Troponin negative x 2. CBC, CMP, BNP, mag unremarkable. Eye exam unremarkable as well. Pt stable for discharge with PCP follow up. Strict return precautions issued. Pt verbalized understanding and agrees with plan.    Alaina Carias MD  EM PGY-4  6:22 AM 03/17/2024        Problems Addressed:  Acute right eye pain: acute illness or injury  Arm paresthesia, right: acute illness or injury  Numbness and tingling of right face: acute illness or  injury  Palpitations: acute illness or injury  Right leg paresthesias: acute illness or injury    Amount and/or Complexity of Data Reviewed  Labs: ordered. Decision-making details documented in ED Course.  Radiology: ordered and independent interpretation performed. Decision-making details documented in ED Course.  ECG/medicine tests: ordered and independent interpretation performed. Decision-making details documented in ED Course.    Risk  Prescription drug management.                                      Clinical Impression:  Final diagnoses:  [R07.9] Chest pain  [R00.2] Palpitations  [R20.0, R20.2] Numbness and tingling of right face (Primary)  [R20.2] Right leg paresthesias  [R20.2] Arm paresthesia, right  [H57.11] Acute right eye pain          ED Disposition Condition    Discharge Stable          ED Prescriptions    None       Follow-up Information       Follow up With Specialties Details Why Contact Info    Justen Reyez MD Family Medicine Schedule an appointment as soon as possible for a visit in 2 days For follow up for your ER visit [address] [facility]  [address] [place] [id number]  [phone]               Alaina Carias MD  Resident  03/17/24 [id number]       Mann Luong MD  03/17/24 [id number]

## 2024-03-17 NOTE — DISCHARGE INSTRUCTIONS
Your labs, CT scan, x-ray, and EKG did not show any major abnormalities. Please follow up with your primary care doctor.    Please return to the ER if you have any of the following:  - Chest pain  - Trouble breathing  - Nausea or vomiting  - Severe abdominal pain  - Lightheadedness or if you feel like you are going to pass out  - Sudden changes to your vision  - Severe headache that does not go away  - Numbness or weakness on one side of your face or body  - Any new or concerning symptoms

## 2024-03-27 ENCOUNTER — HOSPITAL ENCOUNTER (EMERGENCY)
Facility: HOSPITAL | Age: 64
Discharge: HOME | End: 2024-03-27
Attending: STUDENT IN AN ORGANIZED HEALTH CARE EDUCATION/TRAINING PROGRAM
Payer: OTHER GOVERNMENT

## 2024-03-27 VITALS
SYSTOLIC BLOOD PRESSURE: 165 MMHG | WEIGHT: 214 LBS | RESPIRATION RATE: 18 BRPM | HEIGHT: 67 IN | BODY MASS INDEX: 33.59 KG/M2 | DIASTOLIC BLOOD PRESSURE: 79 MMHG | HEART RATE: 88 BPM | TEMPERATURE: 99 F | OXYGEN SATURATION: 100 %

## 2024-03-27 DIAGNOSIS — S39.012A STRAIN OF LUMBAR REGION, INITIAL ENCOUNTER: Primary | ICD-10-CM

## 2024-03-27 DIAGNOSIS — R52 BODY ACHES: ICD-10-CM

## 2024-03-27 LAB
ALBUMIN SERPL BCP-MCNC: 4.3 G/DL (ref 3.5–5.2)
ALP SERPL-CCNC: 44 U/L (ref 55–135)
ALT SERPL W/O P-5'-P-CCNC: 15 U/L (ref 10–44)
ANION GAP SERPL CALC-SCNC: 8 MMOL/L (ref 8–16)
AST SERPL-CCNC: 13 U/L (ref 10–40)
BASOPHILS # BLD AUTO: 0.03 K/UL (ref 0–0.2)
BASOPHILS NFR BLD: 0.6 % (ref 0–1.9)
BILIRUB SERPL-MCNC: 0.6 MG/DL (ref 0.1–1)
BILIRUB UR QL STRIP: NEGATIVE
BNP SERPL-MCNC: 16 PG/ML (ref 0–99)
BUN SERPL-MCNC: 9 MG/DL (ref 8–23)
CALCIUM SERPL-MCNC: 8.7 MG/DL (ref 8.7–10.5)
CHLORIDE SERPL-SCNC: 105 MMOL/L (ref 95–110)
CK SERPL-CCNC: 128 U/L (ref 20–180)
CLARITY UR: CLEAR
CO2 SERPL-SCNC: 25 MMOL/L (ref 23–29)
COLOR UR: YELLOW
CREAT SERPL-MCNC: 0.7 MG/DL (ref 0.5–1.4)
DIFFERENTIAL METHOD BLD: ABNORMAL
EOSINOPHIL # BLD AUTO: 0 K/UL (ref 0–0.5)
EOSINOPHIL NFR BLD: 0.6 % (ref 0–8)
ERYTHROCYTE [DISTWIDTH] IN BLOOD BY AUTOMATED COUNT: 17.1 % (ref 11.5–14.5)
ERYTHROCYTE [SEDIMENTATION RATE] IN BLOOD BY WESTERGREN METHOD: 16 MM/HR (ref 0–20)
EST. GFR  (NO RACE VARIABLE): >60 ML/MIN/1.73 M^2
GLUCOSE SERPL-MCNC: 101 MG/DL (ref 70–110)
GLUCOSE UR QL STRIP: NEGATIVE
HCT VFR BLD AUTO: 44.3 % (ref 37–48.5)
HGB BLD-MCNC: 13.1 G/DL (ref 12–16)
HGB UR QL STRIP: NEGATIVE
IMM GRANULOCYTES # BLD AUTO: 0.01 K/UL (ref 0–0.04)
IMM GRANULOCYTES NFR BLD AUTO: 0.2 % (ref 0–0.5)
INFLUENZA A, MOLECULAR: NEGATIVE
INFLUENZA B, MOLECULAR: NEGATIVE
KETONES UR QL STRIP: NEGATIVE
LEUKOCYTE ESTERASE UR QL STRIP: NEGATIVE
LYMPHOCYTES # BLD AUTO: 1.4 K/UL (ref 1–4.8)
LYMPHOCYTES NFR BLD: 26.5 % (ref 18–48)
MCH RBC QN AUTO: 20.2 PG (ref 27–31)
MCHC RBC AUTO-ENTMCNC: 29.6 G/DL (ref 32–36)
MCV RBC AUTO: 68 FL (ref 82–98)
MONOCYTES # BLD AUTO: 0.3 K/UL (ref 0.3–1)
MONOCYTES NFR BLD: 5.2 % (ref 4–15)
NEUTROPHILS # BLD AUTO: 3.5 K/UL (ref 1.8–7.7)
NEUTROPHILS NFR BLD: 66.9 % (ref 38–73)
NITRITE UR QL STRIP: NEGATIVE
NRBC BLD-RTO: 0 /100 WBC
PH UR STRIP: 6 [PH] (ref 5–8)
PLATELET # BLD AUTO: 338 K/UL (ref 150–450)
PMV BLD AUTO: 10.3 FL (ref 9.2–12.9)
POTASSIUM SERPL-SCNC: 4 MMOL/L (ref 3.5–5.1)
PROT SERPL-MCNC: 7.5 G/DL (ref 6–8.4)
PROT UR QL STRIP: NEGATIVE
RBC # BLD AUTO: 6.49 M/UL (ref 4–5.4)
SARS-COV-2 RDRP RESP QL NAA+PROBE: NEGATIVE
SODIUM SERPL-SCNC: 138 MMOL/L (ref 136–145)
SP GR UR STRIP: 1.02 (ref 1–1.03)
SPECIMEN SOURCE: NORMAL
TROPONIN I SERPL HS-MCNC: 6.4 PG/ML (ref 0–14.9)
URN SPEC COLLECT METH UR: NORMAL
UROBILINOGEN UR STRIP-ACNC: NEGATIVE EU/DL
WBC # BLD AUTO: 5.2 K/UL (ref 3.9–12.7)

## 2024-03-27 PROCEDURE — 99285 EMERGENCY DEPT VISIT HI MDM: CPT | Mod: 25

## 2024-03-27 PROCEDURE — 85025 COMPLETE CBC W/AUTO DIFF WBC: CPT | Performed by: EMERGENCY MEDICINE

## 2024-03-27 PROCEDURE — 87502 INFLUENZA DNA AMP PROBE: CPT | Performed by: EMERGENCY MEDICINE

## 2024-03-27 PROCEDURE — U0002 COVID-19 LAB TEST NON-CDC: HCPCS | Performed by: EMERGENCY MEDICINE

## 2024-03-27 PROCEDURE — 84484 ASSAY OF TROPONIN QUANT: CPT | Performed by: EMERGENCY MEDICINE

## 2024-03-27 PROCEDURE — 81003 URINALYSIS AUTO W/O SCOPE: CPT | Performed by: EMERGENCY MEDICINE

## 2024-03-27 PROCEDURE — 80053 COMPREHEN METABOLIC PANEL: CPT | Performed by: EMERGENCY MEDICINE

## 2024-03-27 PROCEDURE — 83880 ASSAY OF NATRIURETIC PEPTIDE: CPT | Performed by: EMERGENCY MEDICINE

## 2024-03-27 PROCEDURE — 82550 ASSAY OF CK (CPK): CPT | Performed by: EMERGENCY MEDICINE

## 2024-03-27 PROCEDURE — 93005 ELECTROCARDIOGRAM TRACING: CPT | Performed by: GENERAL PRACTICE

## 2024-03-27 PROCEDURE — 93010 ELECTROCARDIOGRAM REPORT: CPT | Mod: ,,, | Performed by: GENERAL PRACTICE

## 2024-03-27 PROCEDURE — 25000003 PHARM REV CODE 250: Performed by: EMERGENCY MEDICINE

## 2024-03-27 PROCEDURE — 85651 RBC SED RATE NONAUTOMATED: CPT | Performed by: EMERGENCY MEDICINE

## 2024-03-27 PROCEDURE — 25500020 PHARM REV CODE 255: Performed by: EMERGENCY MEDICINE

## 2024-03-27 RX ORDER — ACETAMINOPHEN 325 MG/1
650 TABLET ORAL
Status: COMPLETED | OUTPATIENT
Start: 2024-03-27 | End: 2024-03-27

## 2024-03-27 RX ORDER — METHOCARBAMOL 750 MG/1
750 TABLET, FILM COATED ORAL 3 TIMES DAILY
Qty: 15 TABLET | Refills: 0 | Status: SHIPPED | OUTPATIENT
Start: 2024-03-27 | End: 2024-04-01

## 2024-03-27 RX ADMIN — IOHEXOL 100 ML: 350 INJECTION, SOLUTION INTRAVENOUS at 11:03

## 2024-03-27 RX ADMIN — ACETAMINOPHEN 650 MG: 325 TABLET ORAL at 09:03

## 2024-03-27 NOTE — DISCHARGE INSTRUCTIONS
Robaxin as directed for muscle spasms   Ice every 2 hours for 20 minutes   Follow-up with your primary care provider and Dr. Kim as directed  Return for any concerns or if condition becomes worse or for any concerns

## 2024-03-27 NOTE — ED PROVIDER NOTES
Encounter Date: 3/27/2024       History     Chief Complaint   Patient presents with    Back Pain     Mid and lower back pain x 2 days. Recently radiating down both legs    Chills    Generalized Body Aches     63-year-old with a past medical history of CAD, hyperlipidemia, hypertension, previous bypass presents emergency department with complaint of body aches, chills denies any associated fevers, denies any known ill contacts.  Patient is unsure of any other associated symptoms including cough congestion she has had no nausea vomiting diarrhea or abdominal pain.        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 Parrino [facility]    cyst removed from right breast      LASER LAPAROSCOPY      TUBAL LIGATION       Family History   Problem Relation Age of Onset    Cancer Maternal Grandmother     Breast cancer Sister     Asthma Sister     Arrhythmia Mother     Diabetes Mellitus Neg Hx      Social History     Tobacco Use    Smoking status: Former     Current packs/day: 0.00     Types: Cigarettes     Quit date:      Years since quittin.2    Smokeless tobacco: Never   Substance Use Topics    Alcohol use: Yes     Alcohol/week: 1.0 standard drink of alcohol     Types: 1 Glasses of wine per week     Comment: socially    Drug use: No     Review of Systems   Constitutional:  Positive for chills. Negative for fever.   HENT: Negative.     Respiratory: Negative.      Cardiovascular: Negative.    Gastrointestinal: Negative.    Musculoskeletal:  Positive for arthralgias and myalgias.   Skin: Negative.    Neurological: Negative.    Hematological: Negative.    Psychiatric/Behavioral: Negative.         Physical Exam     Initial Vitals [03/27/24 0633]   BP Pulse Resp Temp SpO2   (!) 163/86 94 18 98.9 °F (37.2 °C) 98 %      MAP       --         Physical Exam    Nursing note and vitals reviewed.  Constitutional: She appears well-developed and well-nourished.   Cardiovascular:  Normal rate.           Pulmonary/Chest: Breath sounds normal.   Abdominal: Abdomen is soft. There is abdominal tenderness.   Diffuse abdominal tenderness      Neurological: She is alert and oriented to person, place, and time.   Skin: Skin is warm.         ED Course   Procedures  Labs Reviewed   CBC W/ AUTO DIFFERENTIAL - Abnormal; Notable for the following components:       Result Value    RBC 6.49 (*)     MCV 68 (*)     MCH 20.2 (*)     MCHC 29.6 (*)     RDW 17.1 (*)     All other components within normal limits   COMPREHENSIVE METABOLIC PANEL - Abnormal; Notable for the following components:    Alkaline Phosphatase 44 (*)     All other components within normal limits   TROPONIN I HIGH SENSITIVITY   B-TYPE NATRIURETIC PEPTIDE   SEDIMENTATION RATE   SARS-COV-2 RNA AMPLIFICATION, QUAL   INFLUENZA A AND B ANTIGEN    Narrative:     Specimen Source->Nasopharyngeal Swab   CK   URINALYSIS, REFLEX TO URINE CULTURE    Narrative:     Specimen Source->Urine        ECG Results              EKG 12-lead (In process)        Collection Time Result Time QRS Duration OHS QTC Calculation    03/27/24 09:37:58 03/27/24 10:03:11 82 436                     In process by Interface, Lab In [facility] (03/27/24 10:03:17)                   Narrative:    Test Reason : R06.02,    Vent. Rate : 073 BPM     Atrial Rate : 073 BPM     P-R Int : 160 ms          QRS Dur : 082 ms      QT Int : 396 ms       P-R-T Axes : 045 061 067 degrees     QTc  Int : 436 ms    Normal sinus rhythm  Normal ECG  When compared with ECG of 16-MAR-2024 20:44,  No significant change was found    Referred By: [email or web address]   SELF           Confirmed By:                                   Imaging Results              CT Abdomen Pelvis With IV Contrast NO Oral Contrast (Final result)  Result time 03/27/24 12:05:27      Final result by Marybel Blanton MD (03/27/24 12:05:27)                   Narrative:    EXAMINATION:  CT ABDOMEN PELVIS WITH IV CONTRAST    CLINICAL INDICATION: Female, 63 years old. Abdominal pain, acute, nonlocalized    TECHNIQUE: Helical CT scan examination of the abdomen and pelvis is performed from the domes of the diaphragm to the pubic symphysis with the administration of intravenous contrast material.    CONTRAST: 100 mL Omnipaque 350 mL of IV.    COMPARISON: None    FINDINGS:  Lower Chest: Visualized lung bases are clear. Heart is normal in size. No pericardial or pleural effusion.    Liver: Normal in size and contour. Subcentimeter cysts within the left lobe of the liver.    Bile Ducts: Normal caliber.    Gallbladder: No stones, wall thickening, or pericholecystic fluid.    Pancreas: Normal appearance without focal lesion.    Spleen: Normal in size and contour.    Adrenals: Normal configuration.    Kidneys and Ureters: Normal size and contour. No hydronephrosis.    Bladder: Normal in appearance.    Bowel:  Stomach: Unremarkable.  Small Intestine: Unremarkable.  Appendix: No inflammatory changes.  Colon: Unremarkable.    Vessels: Abdominal aorta and inferior vena cava are normal in course and caliber.    Reproductive Organs: There is a 3.9 cm partially calcified intramural fibroid. There is a right pedunculated calcified fibroid measuring 4.3 cm. The ovaries are normal.    Lymph Nodes: No pathologic mesenteric or retroperitoneal lymph nodes.    Peritoneum: No free air, free fluid, or fluid collection.    Abdominal Wall: No hernia or mass.    Musculoskeletal:  No acute abnormality or suspicious bony lesion.    IMPRESSION:  No acute or significant abnormality seen in the abdomen or pelvis.    Calcified uterine fibroids    This exam was performed according to our departmental dose-optimization program which includes automated exposure control, adjustment of the mA and/or kV according to patient size and/or use of iterative reconstruction technique.    Electronically signed by:  Marybel Blanton MD  03/27/2024 12:05 PM CDT Workstation: [id number]                                     X-Ray Chest PA And Lateral (Final result)  Result time 03/27/24 09:57:08      Final result by Tucker Gardiner MD (03/27/24 09:57:08)                   Narrative:    HISTORY: Chest pain,  body aches.    FINDINGS: Two view chest radiograph compared to prior exams show median sternotomy wires and mediastinal surgical clips from prior CABG, with the cardiomediastinal silhouette and pulmonary vasculature stable and within normal limits.    The lungs are normally and symmetrically expanded, with no consolidation, pleural effusion or evidence of pulmonary edema. No confluent infiltrates or pneumothorax. There are no significant osseous abnormalities.    IMPRESSION: No evidence of active cardiopulmonary disease.    Electronically signed by:  Tucker Gardiner MD  03/27/2024 09:57 AM CDT Workstation: [id number]                                     Medications   acetaminophen tablet 650 mg (650 mg Oral Given 3/27/24 0956)   iohexoL (OMNIPAQUE 350) injection 100 mL (100 mLs Intravenous Given 3/27/24 1144)     Medical Decision Making  63-year-old with a past medical history of CAD, hyperlipidemia, hypertension, previous bypass presents emergency department with complaint of body aches, chills denies any associated fevers, denies any known ill contacts.  Patient is unsure of any other associated symptoms including cough congestion she has had no nausea vomiting diarrhea or abdominal pain.      Considerations  include lumbar strain, lumbar radiculopathy, ACS, UTI, pyelonephritis, viral syndrome, rhabdomyolysis    63-year-old well-appearing female presents emergency department with complaint of body aches although she denies any chills or fevers, and back pain.  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 Kim , diagnosed with chronic back pain and referred to get a outpatient MRI which patient has not done so.  She has had no bowel bladder incontinence urinary retention or saddle anesthesia she has a witnessed today ambulatory gait negative straight leg raise she has had no epidural injections, no fevers no white count nothing consistent with epidural abscess she has no history of cancer.  Patient's labs unremarkable urinalysis is normal CPK normal high sensitivity troponin is normal EKG reveals normal sinus rhythm rate 73 no ST elevation, no changes from previous EKG.  Patient's labs and exam are baseline for the patient seems as if her pain is most related to her back and reproducible with any movement, changing of positions.  Patient is allergic to codeine as well as morphine.  I will discharge the patient home with Robaxin she was instructed to follow up with her primary care provider and Dr. Kim who she is seen previously.  Patient was given return precautions        Amount and/or Complexity of Data Reviewed  External Data Reviewed: labs, radiology and notes.  Labs: ordered. Decision-making details documented in ED Course.  Radiology: ordered. Decision-making details documented in ED Course.  ECG/medicine tests: ordered. Decision-making details documented in ED Course.    Risk  OTC drugs.  Prescription drug management.                                      Clinical Impression:  Final diagnoses:  [R52]  Body aches  [S39.012A] Strain of lumbar region, initial encounter (Primary)          ED Disposition Condition    Discharge Stable          ED Prescriptions       Medication Sig Dispense Start Date End Date Auth. Provider    methocarbamoL (ROBAXIN) 750 MG Tab Take 1 tablet (750 mg total) by mouth 3 (three) times daily. for 5 days 15 tablet 3/27/2024 4/1/2024 Mirtha Barriga FNP          Follow-up Information       Follow up With Specialties Details Why Contact Info    Justen Reyez MD Family Medicine Schedule an appointment as soon as possible for a visit in 2 days  [address] [place] [address]  [address]  [place] [address]  [phone]      Naga Kim MD Physical Medicine and Rehabilitation Schedule an appointment as soon as possible for a visit in 3 days  [address] [place] [address] [address]  [place] [address]  [phone]               Mirtha Barriga FNP  03/27/24 1520

## 2024-03-28 ENCOUNTER — OFFICE VISIT (OUTPATIENT)
Dept: FAMILY MEDICINE | Facility: CLINIC | Age: 64
End: 2024-03-28
Payer: OTHER GOVERNMENT

## 2024-03-28 VITALS
HEART RATE: 64 BPM | RESPIRATION RATE: 18 BRPM | DIASTOLIC BLOOD PRESSURE: 80 MMHG | TEMPERATURE: 98 F | SYSTOLIC BLOOD PRESSURE: 116 MMHG | BODY MASS INDEX: 32.96 KG/M2 | OXYGEN SATURATION: 99 % | WEIGHT: 210 LBS | HEIGHT: 67 IN

## 2024-03-28 DIAGNOSIS — K76.0 FATTY LIVER: ICD-10-CM

## 2024-03-28 DIAGNOSIS — Z01.419 WELL WOMAN EXAM: ICD-10-CM

## 2024-03-28 DIAGNOSIS — Z12.31 SCREENING MAMMOGRAM FOR BREAST CANCER: ICD-10-CM

## 2024-03-28 DIAGNOSIS — M54.9 MID BACK PAIN: Primary | ICD-10-CM

## 2024-03-28 DIAGNOSIS — R71.8 MICROCYTOSIS: ICD-10-CM

## 2024-03-28 LAB
BILIRUB UR QL STRIP: NEGATIVE
GLUCOSE UR QL STRIP: NEGATIVE
KETONES UR QL STRIP: NEGATIVE
LEUKOCYTE ESTERASE UR QL STRIP: NEGATIVE
PH, POC UA: 5 (ref 5–8)
POC BLOOD, URINE: NEGATIVE
POC NITRATES, URINE: NEGATIVE
PROT UR QL STRIP: NEGATIVE
SP GR UR STRIP: 1.02 (ref 1–1.03)
UROBILINOGEN UR STRIP-ACNC: NORMAL (ref 0.1–1.1)

## 2024-03-28 PROCEDURE — 99215 OFFICE O/P EST HI 40 MIN: CPT | Mod: PBBFAC,PN | Performed by: FAMILY MEDICINE

## 2024-03-28 PROCEDURE — 99213 OFFICE O/P EST LOW 20 MIN: CPT | Mod: S$PBB,AQ,, | Performed by: FAMILY MEDICINE

## 2024-03-28 PROCEDURE — 99999 PR PBB SHADOW E&M-EST. PATIENT-LVL V: CPT | Mod: PBBFAC,,, | Performed by: FAMILY MEDICINE

## 2024-03-28 PROCEDURE — 99999PBSHW POCT URINALYSIS, DIPSTICK, AUTOMATED, W/O SCOPE: Mod: PBBFAC,,,

## 2024-03-28 PROCEDURE — 81003 URINALYSIS AUTO W/O SCOPE: CPT | Mod: PBBFAC,PN | Performed by: FAMILY MEDICINE

## 2024-03-28 NOTE — PROGRESS NOTES
Started 3 days ago with a strange odor started having chills but no fever and low back pain, diffuse body aches.  Took Advil without relief.   She did take meloxicam after several days the pain has improved we did review all the labs done in the ER on Wednesday and everything was normal except for possible microcytic changes compatible with iron deficiency.  Subjective:       Patient ID: Marie Salcido is a 63 y.o. female.    Chief Complaint: Referral and Back Pain      Back Pain        Allergies and Medications:   Review of patient's allergies indicates:   Allergen Reactions    Opioids - morphine analogues     Codeine Rash     Current Outpatient Medications   Medication Sig Dispense Refill    methocarbamoL (ROBAXIN) 750 MG Tab Take 1 tablet (750 mg total) by mouth 3 (three) times daily. for 5 days 15 tablet 0    ascorbic acid, vitamin C, (VITAMIN C) 1000 MG tablet Take 1,000 mg by mouth once daily.      aspirin (ECOTRIN) 81 MG EC tablet Take 81 mg by mouth once daily.      diclofenac (VOLTAREN) 50 MG EC tablet Take 1 tablet (50 mg total) by mouth 2 (two) times daily as needed (pain). (Patient not taking: Reported on 3/6/2024) 20 tablet 0    meloxicam (MOBIC) 15 MG tablet Take 1 tablet (15 mg total) by mouth once daily. Take this medication with meals. (Patient not taking: Reported on 3/6/2024) 30 tablet 5    omega 3-dha-epa-fish oil 100-150-750 mg Cap Take 2 capsules by mouth 2 (two) times daily. 120 capsule 11    omega-3 fatty acids/fish oil (FISH OIL-OMEGA-3 FATTY ACIDS) 300-1,000 mg capsule Take 1 capsule by mouth once daily.      pantoprazole (PROTONIX) 40 MG tablet Take 1 tablet (40 mg total) by mouth once daily. (Patient not taking: Reported on 3/28/2024) 30 tablet 11     No current facility-administered medications for this visit.       Family History:   Family History   Problem Relation Age of Onset    Cancer Maternal Grandmother     Breast cancer Sister     Asthma Sister     Arrhythmia Mother     Diabetes  Mellitus Neg Hx        Social History:   Social History     Socioeconomic History    Marital status:    Tobacco Use    Smoking status: Former     Current packs/day: 0.00     Types: Cigarettes     Quit date:      Years since quittin.2    Smokeless tobacco: Never   Substance and Sexual Activity    Alcohol use: Yes     Alcohol/week: 1.0 standard drink of alcohol     Types: 1 Glasses of wine per week     Comment: socially    Drug use: No    Sexual activity: Yes     Partners: Male       Review of Systems   Musculoskeletal:  Positive for back pain.       Objective:     Vitals:    24 1559   BP: 116/80   Pulse: 64   Resp: 18   Temp: 98.4 °F (36.9 °C)        Physical Exam  Vitals and nursing note reviewed.   Constitutional:       Appearance: She is well-developed.   HENT:      Head: Normocephalic and atraumatic.   Eyes:      Extraocular Movements: Extraocular movements intact.      Conjunctiva/sclera: Conjunctivae normal.      Pupils: Pupils are equal, round, and reactive to light.   Cardiovascular:      Rate and Rhythm: Normal rate and regular rhythm.      Heart sounds: Normal heart sounds. No murmur heard.     No friction rub. No gallop.   Pulmonary:      Effort: Pulmonary effort is normal. No respiratory distress.      Breath sounds: Normal breath sounds. No stridor. No wheezing, rhonchi or rales.   Chest:      Chest wall: No tenderness.   Abdominal:      General: Bowel sounds are normal.      Palpations: Abdomen is soft.   Musculoskeletal:         General: Tenderness present. No swelling, deformity or signs of injury.      Right lower leg: No edema.      Left lower leg: No edema.      Comments: Patient has significant lordotic curvature with limited range of motion to torsion and forward flexion.   Psychiatric:         Mood and Affect: Mood normal.         Behavior: Behavior normal.         Thought Content: Thought content normal.         Judgment: Judgment normal.       POCT UA is negative for  leukocytes nitrites ketones glucose protein.  Assessment:       1. Mid back pain    2. Microcytosis    3. Well woman exam    4. Screening mammogram for breast cancer    5. Fatty liver        Plan:       Marie was seen today for referral and back pain.    Diagnoses and all orders for this visit:    Mid back pain  -     Cancel: POCT URINE DIPSTICK WITHOUT MICROSCOPE  -     C-REACTIVE PROTEIN; Future  -     POCT Urinalysis, Dipstick, Automated, W/O Scope    Microcytosis  -     CBC Auto Differential; Future  -     Iron and TIBC; Future    Well woman exam  -     Cancel: Ambulatory referral/consult to Obstetrics / Gynecology; Future  -     Ambulatory referral/consult to Obstetrics / Gynecology; Future    Screening mammogram for breast cancer  -     Mammo Digital Screening Bilat; Future    Fatty liver  -     Vitamin D; Future         Follow up in about 1 month (around 4/28/2024) for follow up hills and back pain.

## 2024-04-08 LAB
OHS QRS DURATION: 82 MS
OHS QTC CALCULATION: 425 MS

## 2024-04-13 LAB
OHS QRS DURATION: 82 MS
OHS QTC CALCULATION: 436 MS

## 2024-04-17 ENCOUNTER — TELEPHONE (OUTPATIENT)
Dept: FAMILY MEDICINE | Facility: CLINIC | Age: 64
End: 2024-04-17
Payer: OTHER GOVERNMENT

## 2024-06-03 ENCOUNTER — HOSPITAL ENCOUNTER (OUTPATIENT)
Dept: RADIOLOGY | Facility: HOSPITAL | Age: 64
Discharge: HOME | End: 2024-06-03
Attending: FAMILY MEDICINE
Payer: OTHER GOVERNMENT

## 2024-06-03 DIAGNOSIS — Z12.31 SCREENING MAMMOGRAM FOR BREAST CANCER: ICD-10-CM

## 2024-06-03 PROCEDURE — 77067 SCR MAMMO BI INCL CAD: CPT | Mod: TC,PO

## 2024-06-03 PROCEDURE — 77063 BREAST TOMOSYNTHESIS BI: CPT | Mod: 26,,, | Performed by: RADIOLOGY

## 2024-06-03 PROCEDURE — 77063 BREAST TOMOSYNTHESIS BI: CPT | Mod: TC,PO

## 2024-06-03 PROCEDURE — 77067 SCR MAMMO BI INCL CAD: CPT | Mod: 26,,, | Performed by: RADIOLOGY

## 2024-06-18 ENCOUNTER — PATIENT OUTREACH (OUTPATIENT)
Dept: ADMINISTRATIVE | Facility: HOSPITAL | Age: 64
End: 2024-06-18
Payer: OTHER GOVERNMENT

## 2024-06-18 NOTE — PROGRESS NOTES
Population Health Chart Review & Patient Outreach Details      Additional [facility] Health Notes:               Updates Requested / Reviewed:      Updated Care Coordination Note, Care Everywhere, , External Sources: [facility] and [facility], Care Team Updated, and Immunizations Reconciliation Completed or Queried: [facility] Topics Overdue:      [facility] Score: 1     Hemoglobin A1c    Pneumonia Vaccine  Tetanus Vaccine  Shingles/Zoster Vaccine  RSV Vaccine                  Health Maintenance Topic(s) Outreach Outcomes & Actions Taken:    Cervical Cancer Screening - Outreach Outcomes & Actions Taken  : External Records Uploaded & Care Team Updated if Applicable    Medication Adherence / Statins - Outreach Outcomes & Actions Taken  : has up comomg cadiology appt.

## 2024-07-15 ENCOUNTER — TELEPHONE (OUTPATIENT)
Dept: CARDIOLOGY | Facility: CLINIC | Age: 64
End: 2024-07-15
Payer: OTHER GOVERNMENT

## 2024-07-15 ENCOUNTER — TELEPHONE (OUTPATIENT)
Dept: FAMILY MEDICINE | Facility: CLINIC | Age: 64
End: 2024-07-15
Payer: OTHER GOVERNMENT

## 2024-07-15 NOTE — TELEPHONE ENCOUNTER
----- Message from Twila Aguilar, Patient Care Assistant sent at 7/15/2024  1:13 PM CDT -----  Regarding: advice  Contact: pt  Type: Needs Medical Advice    Who Called:  pt     Best Call Back Number: [phone] (home)     Additional Information: pt states she would like a callback regarding her 7/16/24 appointment and a referral. Please call to advise. Thanks!

## 2024-07-15 NOTE — TELEPHONE ENCOUNTER
----- Message from Twila Aguilar, Patient Care Assistant sent at 7/15/2024  1:13 PM CDT -----  Regarding: advice  Contact: pt  Type: Needs Medical Advice    Who Called:  pt     Best Call Back Number: [phone] (home)     Additional Information: pt states she would like a callback regarding her 7/16/24 appointment and a referral. Please call to advise. Thanks!

## 2024-07-16 NOTE — TELEPHONE ENCOUNTER
----- Message from Karen Cabral sent at 7/15/2024 10:32 AM CDT -----  Regarding: Patient Requesting Referral - needed today  Contact: patient at [phone]  Type:  Patient Requesting Referral    Who Called:  patient at [phone]    Additional Information:   patient needs to get a referral faxed to [facility] for her cardiology appointment that is tomorrow. She doesn't want to cancel her appointment tomorrow because they are hard to get.  Please call and confirm when done. Thank you

## 2024-07-18 ENCOUNTER — OFFICE VISIT (OUTPATIENT)
Dept: FAMILY MEDICINE | Facility: CLINIC | Age: 64
End: 2024-07-18
Payer: OTHER GOVERNMENT

## 2024-07-18 VITALS
HEIGHT: 67 IN | HEART RATE: 67 BPM | WEIGHT: 212 LBS | BODY MASS INDEX: 33.27 KG/M2 | SYSTOLIC BLOOD PRESSURE: 110 MMHG | RESPIRATION RATE: 18 BRPM | TEMPERATURE: 99 F | DIASTOLIC BLOOD PRESSURE: 78 MMHG | OXYGEN SATURATION: 98 %

## 2024-07-18 DIAGNOSIS — I25.810 CORONARY ARTERY DISEASE INVOLVING CORONARY BYPASS GRAFT OF NATIVE HEART, UNSPECIFIED WHETHER ANGINA PRESENT: Primary | ICD-10-CM

## 2024-07-18 DIAGNOSIS — Z00.00 PREVENTATIVE HEALTH CARE: ICD-10-CM

## 2024-07-18 DIAGNOSIS — M54.10 RADICULOPATHY OF LEG: ICD-10-CM

## 2024-07-18 DIAGNOSIS — E78.2 MIXED HYPERLIPIDEMIA: ICD-10-CM

## 2024-07-18 PROCEDURE — 99214 OFFICE O/P EST MOD 30 MIN: CPT | Mod: S$PBB,AQ,, | Performed by: FAMILY MEDICINE

## 2024-07-18 PROCEDURE — 99999 PR PBB SHADOW E&M-EST. PATIENT-LVL V: CPT | Mod: PBBFAC,,, | Performed by: FAMILY MEDICINE

## 2024-07-18 PROCEDURE — 99215 OFFICE O/P EST HI 40 MIN: CPT | Mod: PBBFAC,PN | Performed by: FAMILY MEDICINE

## 2024-07-18 NOTE — PROGRESS NOTES
Subjective:       Patient ID: Marie Salcido is a 63 y.o. female.    Chief Complaint: Referral (To cardiology/)      Patient is here because she needs referrals to Cardiology and Neurology she has had longstanding lumbar back disease with radiculopathy into the right lower extremity.  She has a distant history of coronary disease with status post bypass graft x2 in 2013.  Lab Results       Component                Value               Date                       WBC                      5.20                03/27/2024                 HGB                      13.1                03/27/2024                 HCT                      44.3                03/27/2024                 PLT                      338                 03/27/2024                 CHOL                     160                 05/18/2022                 TRIG                     117                 05/18/2022                 HDL                      57                  05/18/2022                 ALT                      15                  03/27/2024                 AST                      13                  03/27/2024                 NA                       138                 03/27/2024                 K                        4.0                 03/27/2024                 CL                       105                 03/27/2024                 CREATININE               0.7                 03/27/2024                 BUN                      9                   03/27/2024                 CO2                      25                  03/27/2024                 TSH                      1.970               08/21/2020                 INR                      1.0                 03/16/2024                 HGBA1C                   6.0                 08/21/2020                    Allergies and Medications:   Review of patient's allergies indicates:   Allergen Reactions    Opioids - morphine analogues     Codeine Rash     Current Outpatient Medications    Medication Sig Dispense Refill    ascorbic acid, vitamin C, (VITAMIN C) 1000 MG tablet Take 1,000 mg by mouth once daily.      aspirin (ECOTRIN) 81 MG EC tablet Take 81 mg by mouth once daily.      diclofenac (VOLTAREN) 50 MG EC tablet Take 1 tablet (50 mg total) by mouth 2 (two) times daily as needed (pain). (Patient not taking: Reported on 3/6/2024) 20 tablet 0    omega 3-dha-epa-fish oil 100-150-750 mg Cap Take 2 capsules by mouth 2 (two) times daily. 120 capsule 11    omega-3 fatty acids/fish oil (FISH OIL-OMEGA-3 FATTY ACIDS) 300-1,000 mg capsule Take 1 capsule by mouth once daily. (Patient not taking: Reported on 2024)      pantoprazole (PROTONIX) 40 MG tablet Take 1 tablet (40 mg total) by mouth once daily. (Patient not taking: Reported on 3/28/2024) 30 tablet 11     No current facility-administered medications for this visit.       Family History:   Family History   Problem Relation Name Age of Onset    Arrhythmia Mother      Breast cancer Sister 3 45    Asthma Sister 3     Cancer Maternal Grandmother cervical     Diabetes Mellitus Neg Hx         Social History:   Social History     Socioeconomic History    Marital status:    Tobacco Use    Smoking status: Former     Current packs/day: 0.00     Types: Cigarettes     Quit date:      Years since quittin.5    Smokeless tobacco: Never   Substance and Sexual Activity    Alcohol use: Yes     Alcohol/week: 1.0 standard drink of alcohol     Types: 1 Glasses of wine per week     Comment: socially    Drug use: No    Sexual activity: Yes     Partners: Male     Social Determinants of Health     Financial Resource Strain: Low Risk  (2019)    Received from [facility], [facility]    Overall Financial Resource Strain (CARDIA)     Difficulty of Paying Living Expenses: Not hard at all   Food  Insecurity: No Food Insecurity (11/12/2019)    [facility], [facility]    Hunger Vital Sign     Worried About Running Out of Food in the Last Year: Never true     Ran Out of Food in the Last Year: Never true   Transportation Needs: No Transportation Needs (11/12/2019)    Received from [facility], [facility]    PRAPARE - Transportation     Lack of Transportation (Medical): No     Lack of Transportation (Non-Medical): No       Review of Systems   Constitutional:  Negative for activity change, appetite change, chills, diaphoresis, fatigue, fever and unexpected weight change.   HENT:  Negative for congestion, dental problem, drooling, ear discharge, ear pain, facial swelling, hearing loss, mouth sores, nosebleeds, postnasal drip, rhinorrhea, sinus pressure, sinus pain, sneezing, sore throat, tinnitus, trouble swallowing and voice change.    Eyes:  Negative for photophobia, pain, discharge, redness, itching and visual disturbance.   Respiratory:  Negative for apnea, cough, choking, chest tightness, shortness of breath, wheezing and stridor.    Cardiovascular:  Positive for palpitations. Negative for chest pain and leg swelling.       Objective:     Vitals:    07/18/24 1321   BP: 110/78   Pulse: 67   Resp: 18   Temp: 98.8 °F (37.1 °C)        Physical Exam  Constitutional:       General: She is not in acute distress.     Appearance: Normal appearance. She is normal weight. She is not ill-appearing, toxic-appearing or diaphoretic.   Musculoskeletal:        Hands:         Legs:    Neurological:      Mental Status: She is alert.         Assessment:       1. Coronary artery disease involving coronary bypass graft of  native heart, unspecified whether angina present    2. Radiculopathy of leg    3. Mixed hyperlipidemia    4. Preventative health care        Plan:       Marie was seen today for referral.    Diagnoses and all orders for this visit:    Coronary artery disease involving coronary bypass graft of native heart, unspecified whether angina present  -     Ambulatory referral/consult to Cardiology; Future  -     Cardiac Monitor - 3-15 Day Adult (Cupid Only); Future  -     Hemoglobin A1C; Future    Radiculopathy of leg  -     Ambulatory referral/consult to Neurology; Future    Mixed hyperlipidemia  -     Comprehensive Metabolic Panel; Future  -     Lipid Panel; Future    Preventative health care  -     CBC Auto Differential; Future         Follow up in about 6 months (around 1/18/2025) for follow up cholesterol.

## 2024-07-19 ENCOUNTER — TELEPHONE (OUTPATIENT)
Dept: CARDIOLOGY | Facility: CLINIC | Age: 64
End: 2024-07-19
Payer: OTHER GOVERNMENT

## 2024-07-19 NOTE — TELEPHONE ENCOUNTER
----- Message from Twila Aguilar, Patient Care Assistant sent at 7/19/2024  9:36 AM CDT -----  Regarding: orders  Contact: pt  Type: Needs Medical Advice    Who Called:  pt     Best Call Back Number: [phone] (home)     Additional Information: pt states she would like a callback regarding her referral. Please call to advise. Thanks!

## 2024-07-23 ENCOUNTER — TELEPHONE (OUTPATIENT)
Dept: FAMILY MEDICINE | Facility: CLINIC | Age: 64
End: 2024-07-23
Payer: OTHER GOVERNMENT

## 2024-07-23 ENCOUNTER — LAB VISIT (OUTPATIENT)
Dept: LAB | Facility: HOSPITAL | Age: 64
End: 2024-07-23
Attending: FAMILY MEDICINE
Payer: OTHER GOVERNMENT

## 2024-07-23 DIAGNOSIS — I25.810 CORONARY ARTERY DISEASE INVOLVING CORONARY BYPASS GRAFT OF NATIVE HEART, UNSPECIFIED WHETHER ANGINA PRESENT: ICD-10-CM

## 2024-07-23 DIAGNOSIS — Z00.00 PREVENTATIVE HEALTH CARE: ICD-10-CM

## 2024-07-23 DIAGNOSIS — E78.2 MIXED HYPERLIPIDEMIA: ICD-10-CM

## 2024-07-23 LAB
ALBUMIN SERPL BCP-MCNC: 3.6 G/DL (ref 3.5–5.2)
ALP SERPL-CCNC: 46 U/L (ref 55–135)
ALT SERPL W/O P-5'-P-CCNC: 19 U/L (ref 10–44)
ANION GAP SERPL CALC-SCNC: 4 MMOL/L (ref 8–16)
AST SERPL-CCNC: 20 U/L (ref 10–40)
BASOPHILS # BLD AUTO: 0.03 K/UL (ref 0–0.2)
BASOPHILS NFR BLD: 0.7 % (ref 0–1.9)
BILIRUB SERPL-MCNC: 0.4 MG/DL (ref 0.1–1)
BUN SERPL-MCNC: 11 MG/DL (ref 8–23)
CALCIUM SERPL-MCNC: 8.2 MG/DL (ref 8.7–10.5)
CHLORIDE SERPL-SCNC: 108 MMOL/L (ref 95–110)
CHOLEST SERPL-MCNC: 167 MG/DL (ref 120–199)
CHOLEST/HDLC SERPL: 2.8 {RATIO} (ref 2–5)
CO2 SERPL-SCNC: 26 MMOL/L (ref 23–29)
CREAT SERPL-MCNC: 0.8 MG/DL (ref 0.5–1.4)
DIFFERENTIAL METHOD BLD: ABNORMAL
EOSINOPHIL # BLD AUTO: 0.1 K/UL (ref 0–0.5)
EOSINOPHIL NFR BLD: 2.5 % (ref 0–8)
ERYTHROCYTE [DISTWIDTH] IN BLOOD BY AUTOMATED COUNT: 17.7 % (ref 11.5–14.5)
EST. GFR  (NO RACE VARIABLE): >60 ML/MIN/1.73 M^2
ESTIMATED AVG GLUCOSE: 117 MG/DL (ref 68–131)
GLUCOSE SERPL-MCNC: 99 MG/DL (ref 70–110)
HBA1C MFR BLD: 5.7 % (ref 4–5.6)
HCT VFR BLD AUTO: 46.3 % (ref 37–48.5)
HDLC SERPL-MCNC: 60 MG/DL (ref 40–75)
HDLC SERPL: 35.9 % (ref 20–50)
HGB BLD-MCNC: 13.2 G/DL (ref 12–16)
IMM GRANULOCYTES # BLD AUTO: 0 K/UL (ref 0–0.04)
IMM GRANULOCYTES NFR BLD AUTO: 0 % (ref 0–0.5)
LDLC SERPL CALC-MCNC: 88.6 MG/DL (ref 63–159)
LYMPHOCYTES # BLD AUTO: 1.5 K/UL (ref 1–4.8)
LYMPHOCYTES NFR BLD: 38 % (ref 18–48)
MCH RBC QN AUTO: 20.2 PG (ref 27–31)
MCHC RBC AUTO-ENTMCNC: 28.5 G/DL (ref 32–36)
MCV RBC AUTO: 71 FL (ref 82–98)
MONOCYTES # BLD AUTO: 0.4 K/UL (ref 0.3–1)
MONOCYTES NFR BLD: 8.7 % (ref 4–15)
NEUTROPHILS # BLD AUTO: 2 K/UL (ref 1.8–7.7)
NEUTROPHILS NFR BLD: 50.1 % (ref 38–73)
NONHDLC SERPL-MCNC: 107 MG/DL
NRBC BLD-RTO: 0 /100 WBC
PLATELET # BLD AUTO: 293 K/UL (ref 150–450)
PMV BLD AUTO: 11.4 FL (ref 9.2–12.9)
POTASSIUM SERPL-SCNC: 4.2 MMOL/L (ref 3.5–5.1)
PROT SERPL-MCNC: 6.9 G/DL (ref 6–8.4)
RBC # BLD AUTO: 6.55 M/UL (ref 4–5.4)
SODIUM SERPL-SCNC: 138 MMOL/L (ref 136–145)
TRIGL SERPL-MCNC: 92 MG/DL (ref 30–150)
WBC # BLD AUTO: 4.03 K/UL (ref 3.9–12.7)

## 2024-07-23 PROCEDURE — 85025 COMPLETE CBC W/AUTO DIFF WBC: CPT | Performed by: FAMILY MEDICINE

## 2024-07-23 PROCEDURE — 80061 LIPID PANEL: CPT | Performed by: FAMILY MEDICINE

## 2024-07-23 PROCEDURE — 83036 HEMOGLOBIN GLYCOSYLATED A1C: CPT | Performed by: FAMILY MEDICINE

## 2024-07-23 PROCEDURE — 80053 COMPREHEN METABOLIC PANEL: CPT | Performed by: FAMILY MEDICINE

## 2024-07-23 PROCEDURE — 36415 COLL VENOUS BLD VENIPUNCTURE: CPT | Performed by: FAMILY MEDICINE

## 2024-07-23 NOTE — TELEPHONE ENCOUNTER
Checked on patient's [facility] referrals with [facility] and they are having problems with referrals going to the wrong cues.  This was 3 of them. They will contact me whem they find something out.

## 2024-07-24 DIAGNOSIS — R71.8 RBC MICROCYTOSIS: Primary | ICD-10-CM

## 2024-07-24 NOTE — TELEPHONE ENCOUNTER
----- Message from Justen Reyez MD sent at 7/24/2024  8:06 AM CDT -----  No anemia but probable iron deficiency.  Will check iron levels and recommend a prenatal vitamin with iron daily.

## 2024-07-24 NOTE — PROGRESS NOTES
No anemia but probable iron deficiency.  Will check iron levels and recommend a prenatal vitamin with iron daily.

## 2024-07-24 NOTE — TELEPHONE ENCOUNTER
----- Message from Twila Aguilar, Patient Care Assistant sent at 7/23/2024  8:04 AM CDT -----  Regarding: appointment  Contact: pt  Type: Needs Medical Advice    Who Called:  pt     Best Call Back Number: [phone]     Additional Information: pt states she would like a callback regarding rescheduling her Holter monitor appointment on 7/23/24. Please call to advise. Thanks!

## 2024-08-28 ENCOUNTER — HOSPITAL ENCOUNTER (EMERGENCY)
Facility: HOSPITAL | Age: 64
Discharge: HOME | End: 2024-08-29
Attending: EMERGENCY MEDICINE
Payer: OTHER GOVERNMENT

## 2024-08-28 DIAGNOSIS — K80.20 CALCULUS OF GALLBLADDER WITHOUT CHOLECYSTITIS WITHOUT OBSTRUCTION: Primary | ICD-10-CM

## 2024-08-28 DIAGNOSIS — R10.11 RIGHT UPPER QUADRANT PAIN: ICD-10-CM

## 2024-08-28 LAB
ALBUMIN SERPL BCP-MCNC: 3.7 G/DL (ref 3.5–5.2)
ALP SERPL-CCNC: 47 U/L (ref 55–135)
ALT SERPL W/O P-5'-P-CCNC: 19 U/L (ref 10–44)
ANION GAP SERPL CALC-SCNC: 9 MMOL/L (ref 8–16)
AST SERPL-CCNC: 20 U/L (ref 10–40)
BASOPHILS # BLD AUTO: 0.05 K/UL (ref 0–0.2)
BASOPHILS NFR BLD: 0.8 % (ref 0–1.9)
BILIRUB SERPL-MCNC: 0.3 MG/DL (ref 0.1–1)
BILIRUB UR QL STRIP: NEGATIVE
BUN SERPL-MCNC: 15 MG/DL (ref 8–23)
CALCIUM SERPL-MCNC: 9 MG/DL (ref 8.7–10.5)
CHLORIDE SERPL-SCNC: 108 MMOL/L (ref 95–110)
CLARITY UR: CLEAR
CO2 SERPL-SCNC: 23 MMOL/L (ref 23–29)
COLOR UR: YELLOW
CREAT SERPL-MCNC: 0.9 MG/DL (ref 0.5–1.4)
DIFFERENTIAL METHOD BLD: ABNORMAL
EOSINOPHIL # BLD AUTO: 0.1 K/UL (ref 0–0.5)
EOSINOPHIL NFR BLD: 1.6 % (ref 0–8)
ERYTHROCYTE [DISTWIDTH] IN BLOOD BY AUTOMATED COUNT: 15.7 % (ref 11.5–14.5)
EST. GFR  (NO RACE VARIABLE): >60 ML/MIN/1.73 M^2
GLUCOSE SERPL-MCNC: 95 MG/DL (ref 70–110)
GLUCOSE UR QL STRIP: NEGATIVE
HCT VFR BLD AUTO: 41.2 % (ref 37–48.5)
HGB BLD-MCNC: 12.4 G/DL (ref 12–16)
HGB UR QL STRIP: NEGATIVE
IMM GRANULOCYTES # BLD AUTO: 0.01 K/UL (ref 0–0.04)
IMM GRANULOCYTES NFR BLD AUTO: 0.2 % (ref 0–0.5)
KETONES UR QL STRIP: NEGATIVE
LEUKOCYTE ESTERASE UR QL STRIP: NEGATIVE
LYMPHOCYTES # BLD AUTO: 1.9 K/UL (ref 1–4.8)
LYMPHOCYTES NFR BLD: 32 % (ref 18–48)
MAGNESIUM SERPL-MCNC: 2 MG/DL (ref 1.6–2.6)
MCH RBC QN AUTO: 20.3 PG (ref 27–31)
MCHC RBC AUTO-ENTMCNC: 30.1 G/DL (ref 32–36)
MCV RBC AUTO: 68 FL (ref 82–98)
MONOCYTES # BLD AUTO: 0.5 K/UL (ref 0.3–1)
MONOCYTES NFR BLD: 8.7 % (ref 4–15)
NEUTROPHILS # BLD AUTO: 3.4 K/UL (ref 1.8–7.7)
NEUTROPHILS NFR BLD: 56.7 % (ref 38–73)
NITRITE UR QL STRIP: NEGATIVE
NRBC BLD-RTO: 0 /100 WBC
PH UR STRIP: 8 [PH] (ref 5–8)
PLATELET # BLD AUTO: 251 K/UL (ref 150–450)
PMV BLD AUTO: 10.5 FL (ref 9.2–12.9)
POTASSIUM SERPL-SCNC: 3.7 MMOL/L (ref 3.5–5.1)
PROT SERPL-MCNC: 7 G/DL (ref 6–8.4)
PROT UR QL STRIP: ABNORMAL
RBC # BLD AUTO: 6.1 M/UL (ref 4–5.4)
SODIUM SERPL-SCNC: 140 MMOL/L (ref 136–145)
SP GR UR STRIP: 1.02 (ref 1–1.03)
URN SPEC COLLECT METH UR: ABNORMAL
UROBILINOGEN UR STRIP-ACNC: NEGATIVE EU/DL
WBC # BLD AUTO: 6.07 K/UL (ref 3.9–12.7)

## 2024-08-28 PROCEDURE — 99285 EMERGENCY DEPT VISIT HI MDM: CPT | Mod: 25

## 2024-08-28 PROCEDURE — 83735 ASSAY OF MAGNESIUM: CPT | Performed by: EMERGENCY MEDICINE

## 2024-08-28 PROCEDURE — 85025 COMPLETE CBC W/AUTO DIFF WBC: CPT | Performed by: EMERGENCY MEDICINE

## 2024-08-28 PROCEDURE — 36415 COLL VENOUS BLD VENIPUNCTURE: CPT | Performed by: EMERGENCY MEDICINE

## 2024-08-28 PROCEDURE — 81003 URINALYSIS AUTO W/O SCOPE: CPT | Performed by: EMERGENCY MEDICINE

## 2024-08-28 PROCEDURE — 80053 COMPREHEN METABOLIC PANEL: CPT | Performed by: EMERGENCY MEDICINE

## 2024-08-29 VITALS
TEMPERATURE: 98 F | HEART RATE: 57 BPM | BODY MASS INDEX: 33.59 KG/M2 | OXYGEN SATURATION: 100 % | WEIGHT: 214 LBS | RESPIRATION RATE: 18 BRPM | HEIGHT: 67 IN | SYSTOLIC BLOOD PRESSURE: 143 MMHG | DIASTOLIC BLOOD PRESSURE: 65 MMHG

## 2024-08-29 PROCEDURE — 25000003 PHARM REV CODE 250: Performed by: EMERGENCY MEDICINE

## 2024-08-29 RX ORDER — ACETAMINOPHEN 500 MG
1000 TABLET ORAL
Status: COMPLETED | OUTPATIENT
Start: 2024-08-29 | End: 2024-08-29

## 2024-08-29 RX ORDER — HYOSCYAMINE SULFATE 0.125 MG
125 TABLET ORAL EVERY 4 HOURS PRN
Qty: 20 TABLET | Refills: 0 | Status: SHIPPED | OUTPATIENT
Start: 2024-08-29 | End: 2024-09-28

## 2024-08-29 RX ADMIN — ACETAMINOPHEN 1000 MG: 500 TABLET ORAL at 12:08

## 2024-08-29 NOTE — ED PROVIDER NOTES
Encounter Date: 2024       History     Chief Complaint   Patient presents with    Flank Pain     Intermittent Right sided flank pain, reports pain under ribs.  Described as tightness.  No trauma.      Patient presents emergency department with reported right upper abdominal pain radiating to her right flank she denies any significant nausea vomiting no diarrhea no blood in his stool no dysuria urgency or frequency no hematuria no fever chills no cough no chest pain no shortness of breath states symptoms have been present for some time but have worsened she has a history of coronary artery bypass but no previous abdominal surgeries other than tubal ligation        Review of patient's allergies indicates:   Allergen Reactions    Opioids - morphine analogues     Codeine Rash     Past Medical History:   Diagnosis Date    Anemia     Coronary artery disease     Hyperlipidemia     Hypertension     Personal history of colonic polyps 2021    Silent myocardial infarction     Sleep apnea      Past Surgical History:   Procedure Laterality Date    BREAST CYST ASPIRATION      COLONOSCOPY  2021    7 Yr Recall    CORONARY ARTERY BYPASS GRAFT  10/04/2013    2-vessel CABG LIMA-LAD, left SVG-OM2 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 Parrino [facility]    cyst removed from right breast      LASER LAPAROSCOPY      TUBAL LIGATION       Family History   Problem Relation Name Age of Onset    Arrhythmia Mother      Breast cancer Sister 3 45    Asthma Sister 3     Cancer Maternal Grandmother cervical     Diabetes Mellitus Neg Hx       Social History     Tobacco Use    Smoking status: Former     Current packs/day: 0.00     Types: Cigarettes     Quit date:      Years since quittin.6    Smokeless tobacco: Never   Substance Use Topics    Alcohol use: Yes     Alcohol/week: 1.0 standard drink of alcohol     Types: 1 Glasses of wine per week     Comment: daily    Drug use: No     Review of  Systems   Constitutional:  Negative for chills and fever.   Respiratory:  Negative for cough.    Gastrointestinal:  Positive for abdominal pain. Negative for blood in stool, diarrhea, nausea and vomiting.   Genitourinary:  Negative for dysuria and hematuria.   All other systems reviewed and are negative.      Physical Exam     Initial Vitals [08/28/24 2024]   BP Pulse Resp Temp SpO2   136/89 73 18 98.3 °F (36.8 °C) 98 %      MAP       --         Physical Exam    Constitutional: She appears well-developed and well-nourished. No distress.   HENT:   Head: Normocephalic and atraumatic.   Right Ear: External ear normal.   Left Ear: External ear normal.   Mouth/Throat: Oropharynx is clear and moist.   Eyes: Conjunctivae and EOM are normal. Pupils are equal, round, and reactive to light.   Neck: Neck supple.   Normal range of motion.  Cardiovascular:  Normal rate, regular rhythm, normal heart sounds and intact distal pulses.           Pulmonary/Chest: Breath sounds normal. No respiratory distress.   Abdominal: Abdomen is soft. Bowel sounds are normal. There is abdominal tenderness in the right upper quadrant.   Musculoskeletal:         General: No edema. Normal range of motion.      Cervical back: Normal range of motion and neck supple.     Neurological: She is alert and oriented to person, place, and time. She has normal strength. GCS score is 15. GCS eye subscore is 4. GCS verbal subscore is 5. GCS motor subscore is 6.   Skin: Skin is warm and dry. Capillary refill takes less than 2 seconds. No rash noted.   Psychiatric: She has a normal mood and affect. Her behavior is normal.         ED Course   Procedures  Labs Reviewed   CBC W/ AUTO DIFFERENTIAL - Abnormal       Result Value    WBC 6.07      RBC 6.10 (*)     Hemoglobin 12.4      Hematocrit 41.2      MCV 68 (*)     MCH 20.3 (*)     MCHC 30.1 (*)     RDW 15.7 (*)     Platelets 251      MPV 10.5      Immature Granulocytes 0.2      Gran # (ANC) 3.4      Immature Grans  (Abs) 0.01      Lymph # 1.9      Mono # 0.5      Eos # 0.1      Baso # 0.05      nRBC 0      Gran % 56.7      Lymph % 32.0      Mono % 8.7      Eosinophil % 1.6      Basophil % 0.8      Differential Method Automated     COMPREHENSIVE METABOLIC PANEL - Abnormal    Sodium 140      Potassium 3.7      Chloride 108      CO2 23      Glucose 95      BUN 15      Creatinine 0.9      Calcium 9.0      Total Protein 7.0      Albumin 3.7      Total Bilirubin 0.3      Alkaline Phosphatase 47 (*)     AST 20      ALT 19      eGFR >60      Anion Gap 9     URINALYSIS, REFLEX TO URINE CULTURE - Abnormal    Specimen UA Urine, Clean Catch      Color, UA Yellow      Appearance, UA Clear      pH, UA 8.0      Specific Gravity, UA 1.025      Protein, UA Trace (*)     Glucose, UA Negative      Ketones, UA Negative      Bilirubin (UA) Negative      Occult Blood UA Negative      Nitrite, UA Negative      Urobilinogen, UA Negative      Leukocytes, UA Negative      Narrative:     Specimen Source->Urine   MAGNESIUM    Magnesium 2.0            Imaging Results              X-Ray Chest AP Portable (In process)                      US Abdomen Limited (Gallbladder) (In process)                      Medications - No data to display  Medical Decision Making  Laboratory evaluation reviewed within normal limits ultrasound does show evidence of gallstones but no acute cholecystitis noted given patient's pain that appears to be increasing over some time will refer her to General surgery for evaluation for cholecystectomy return to ER for any worsened symptoms or new symptoms will provide her with a prescription for Levsin to use as needed for the discomfort return precautions for any nausea vomiting fever chills worsened abdominal pain    Amount and/or Complexity of Data Reviewed  Labs: ordered. Decision-making details documented in ED Course.  Radiology: ordered. Decision-making details documented in ED Course.                                      Clinical  Impression:  Final diagnoses:  [R10.11] Right upper quadrant pain  [K80.20] Calculus of gallbladder without cholecystitis without obstruction (Primary)          ED Disposition Condition    Discharge Stable          ED Prescriptions    None       Follow-up Information       Follow up With Specialties Details Why Contact Info    Justen Reyez MD Family Medicine Today for further evaluation and treatment [address]  [address]  [place] [address]  [phone]      Malik Diamond III, MD General Surgery, Surgery Call in 1 day for further evaluation and treatment [address]  [address]  [place] [address]  [phone]               Mann Luong MD  08/29/24 0013

## 2024-09-03 ENCOUNTER — HOSPITAL ENCOUNTER (OUTPATIENT)
Dept: CARDIOLOGY | Facility: CLINIC | Age: 64
Discharge: HOME | End: 2024-09-03
Attending: FAMILY MEDICINE
Payer: OTHER GOVERNMENT

## 2024-09-03 DIAGNOSIS — I25.810 CORONARY ARTERY DISEASE INVOLVING CORONARY BYPASS GRAFT OF NATIVE HEART, UNSPECIFIED WHETHER ANGINA PRESENT: ICD-10-CM

## 2024-09-09 ENCOUNTER — OFFICE VISIT (OUTPATIENT)
Dept: CARDIOLOGY | Facility: CLINIC | Age: 64
End: 2024-09-09
Payer: OTHER GOVERNMENT

## 2024-09-09 VITALS
OXYGEN SATURATION: 98 % | WEIGHT: 211 LBS | DIASTOLIC BLOOD PRESSURE: 64 MMHG | HEART RATE: 59 BPM | HEIGHT: 67 IN | BODY MASS INDEX: 33.12 KG/M2 | RESPIRATION RATE: 16 BRPM | SYSTOLIC BLOOD PRESSURE: 118 MMHG

## 2024-09-09 DIAGNOSIS — M54.2 NECK PAIN: ICD-10-CM

## 2024-09-09 DIAGNOSIS — E66.01 CLASS 2 SEVERE OBESITY DUE TO EXCESS CALORIES WITH SERIOUS COMORBIDITY IN ADULT, UNSPECIFIED BMI: ICD-10-CM

## 2024-09-09 DIAGNOSIS — I25.810 CORONARY ARTERY DISEASE INVOLVING CORONARY BYPASS GRAFT OF NATIVE HEART, UNSPECIFIED WHETHER ANGINA PRESENT: ICD-10-CM

## 2024-09-09 DIAGNOSIS — Q24.5 ANOMALOUS CORONARY ARTERY ORIGIN: ICD-10-CM

## 2024-09-09 DIAGNOSIS — E78.2 MIXED HYPERLIPIDEMIA: ICD-10-CM

## 2024-09-09 DIAGNOSIS — Z95.1 S/P CABG X 2: Primary | ICD-10-CM

## 2024-09-09 DIAGNOSIS — I20.89 OTHER FORMS OF ANGINA PECTORIS: ICD-10-CM

## 2024-09-09 PROCEDURE — 99214 OFFICE O/P EST MOD 30 MIN: CPT | Mod: PBBFAC,PN | Performed by: INTERNAL MEDICINE

## 2024-09-09 PROCEDURE — 99205 OFFICE O/P NEW HI 60 MIN: CPT | Mod: S$PBB,,, | Performed by: INTERNAL MEDICINE

## 2024-09-09 PROCEDURE — 99999 PR PBB SHADOW E&M-EST. PATIENT-LVL IV: CPT | Mod: PBBFAC,,, | Performed by: INTERNAL MEDICINE

## 2024-09-09 NOTE — ASSESSMENT & PLAN NOTE
She has disc bulging at multiple levels in MRI.  Recommend to repeat the MRI to assess the extent of cervical changes that could be accounting for symptoms of severe pain bilaterally.

## 2024-09-09 NOTE — ASSESSMENT & PLAN NOTE
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  She had LIMA to LAD and a saphenous vein graft to the marginal branch.  Presently she was having increasing symptoms in the upper extremities but admits to having intermittent episodes of substernal chest pain.  As has been almost 10 years since revascularization recommend the following  1. Obtain a symptom limited stress test or Lexiscan Myoview to evaluate for any ischemia.    2. Consider obtaining a CTA of the coronary arteries.    3. Regardless continue on aggressive risk factor modification.  4. Maintain on aspirin therapy   5. The goal is to keep her LDL cholesterol 70 or less and triglycerides less than 150.

## 2024-09-09 NOTE — ASSESSMENT & PLAN NOTE
Recommend more aggressive risk factor modification   Latest Reference Range & Units 07/23/24 09:47   Cholesterol Total 120 - 199 mg/dL 167   HDL 40 - 75 mg/dL 60   HDL/Cholesterol Ratio 20.0 - 50.0 % 35.9   Non-HDL Cholesterol mg/dL 107   Total Cholesterol/HDL Ratio 2.0 - 5.0  2.8   Triglycerides 30 - 150 mg/dL 92   LDL Cholesterol 63.0 - 159.0 mg/dL 88.6     Encouraged her to get on a strict diet and re-evaluate the lipid levels in about 3 months' time if it is not better in the LDL cholesterol is not below 70 ideally below 60 then she should be on statin therapy.

## 2024-09-09 NOTE — ASSESSMENT & PLAN NOTE
Recurrent episodes of chest pain and arm pains recommend further cardiac evaluation.  Lexiscan Myoview to evaluate for any evidence of coronary insufficiency and echocardiogram for LV function assessment

## 2024-09-09 NOTE — PROGRESS NOTES
Subjective:    Patient ID:  Marie Salcido is a 64 y.o. female patient here for evaluation Establish Care (Previous cardio- Dr. Kd Cisse)      History of Present Illness:   Patient is a 64-year-old lady who has history of anomalous left main coronary artery requiring surgical intervention with two-vessel bypass done by Dr. Posadas on October 4, 2013 LIMA to LAD and a saphenous vein graft to the obtuse marginal branches performed and she has been doing reasonably well she continue to follow-up with Dr. Kd Cisse as an outpatient until about 2 years ago more recently her insurance coverage is not accepted by Dr. Cisse and she was now seeking to establish cardiovascular care with me.  Patient has been having intermittent episodes of palpitations and she had a Zio monitor placed and she was to complete the analysis.  In addition to that she has been having heaviness in pains in her upper extremities bilaterally.  She was a  and uses arms lot and she continues to have discomfort with physical exertion.  Denies having any significant jaw pain or significant heaviness in his substernal area.  And a associated to this she does have some discomfort in the right upper quadrant diagnosed with some gallstones and occasionally in the left upper quadrant as well.  This has no blood in the stools no black stools .  She does have nausea and this has associated to gallbladder disease      Review of patient's allergies indicates:   Allergen Reactions    Opioids - morphine analogues     Codeine Rash       Past Medical History:   Diagnosis Date    Anemia     Coronary artery disease     Hyperlipidemia     Hypertension     Personal history of colonic polyps 07/28/2021    Silent myocardial infarction     Sleep apnea      Past Surgical History:   Procedure Laterality Date    BREAST CYST ASPIRATION      COLONOSCOPY  07/28/2021    7 Yr Recall    CORONARY ARTERY BYPASS GRAFT  10/04/2013    2-vessel CABG LIMA-LAD, left  SVG-OM2 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 Parrino [facility]    cyst removed from right breast      LASER LAPAROSCOPY      TUBAL LIGATION       Social History     Tobacco Use    Smoking status: Former     Current packs/day: 0.00     Types: Cigarettes     Quit date:      Years since quittin.6    Smokeless tobacco: Never   Substance Use Topics    Alcohol use: Yes     Alcohol/week: 1.0 standard drink of alcohol     Types: 1 Glasses of wine per week     Comment: daily    Drug use: No        Review of Systems   As noted in HPI in addition     Constitutional: Negative for chills, fatigue and fever.   Eyes: No double vision, No blurred vision  Neuro: No headaches, No dizziness  Respiratory: Negative for cough, shortness of breath and wheezing.    Cardiovascular: Negative for chest pain.  Palpitations occur randomly with no specific pattern.  As discussed in the history she was bilateral arm pains and heaviness.  Gastrointestinal: Negative for abdominal pain, positive for dyspeptic symptoms which she attributes to gallbladder disease   Genitourinary: Negative for dysuria and frequency, Negative for hematuria  Skin: Negative for bruising, Negative for edema or discoloration noted.   Endocrine: Negative for polyphagia, Negative for heat intolerance, Negative for cold intolerance  Psychiatric: Negative for depression, Negative for anxiety, Negative for memory loss  Musculoskeletal: Negative for neck pain, Negative for muscle weakness, Negative for back pain   Bilateral pains in her arms noted.  Sometimes as very intense brings tears to her eyes at times.  And sometimes it wakes her up from sleep  Previous MRI has shown disc bulging at multiple levels       Objective        Vitals:    24 1142   BP: 118/64   Pulse: (!) 59   Resp: 16       LIPIDS - LAST 2   Lab Results   Component Value Date    CHOL 167 2024    CHOL 160 2022    HDL 60 2024    HDL 57 2022     LDLCALC 88.6 07/23/2024    LDLCALC 82 05/18/2022    TRIG 92 07/23/2024    TRIG 117 05/18/2022    CHOLHDL 35.9 07/23/2024    CHOLHDL 2.8 05/18/2022       CBC - LAST 2  Lab Results   Component Value Date    WBC 6.07 08/28/2024    WBC 4.03 07/23/2024    RBC 6.10 (H) 08/28/2024    RBC 6.55 (H) 07/23/2024    HGB 12.4 08/28/2024    HGB 13.2 07/23/2024    HCT 41.2 08/28/2024    HCT 46.3 07/23/2024    MCV 68 (L) 08/28/2024    MCV 71 (L) 07/23/2024    MCH 20.3 (L) 08/28/2024    MCH 20.2 (L) 07/23/2024    MCHC 30.1 (L) 08/28/2024    MCHC 28.5 (L) 07/23/2024    RDW 15.7 (H) 08/28/2024    RDW 17.7 (H) 07/23/2024     08/28/2024     07/23/2024    MPV 10.5 08/28/2024    MPV 11.4 07/23/2024    GRAN 3.4 08/28/2024    GRAN 56.7 08/28/2024    LYMPH 1.9 08/28/2024    LYMPH 32.0 08/28/2024    MONO 0.5 08/28/2024    MONO 8.7 08/28/2024    BASO 0.05 08/28/2024    BASO 0.03 07/23/2024    NRBC 0 08/28/2024    NRBC 0 07/23/2024       CHEMISTRY & LIVER FUNCTION - LAST 2  Lab Results   Component Value Date     08/28/2024     07/23/2024    K 3.7 08/28/2024    K 4.2 07/23/2024     08/28/2024     07/23/2024    CO2 23 08/28/2024    CO2 26 07/23/2024    ANIONGAP 9 08/28/2024    ANIONGAP 4 (L) 07/23/2024    BUN 15 08/28/2024    BUN 11 07/23/2024    CREATININE 0.9 08/28/2024    CREATININE 0.8 07/23/2024    GLU 95 08/28/2024    GLU 99 07/23/2024    CALCIUM 9.0 08/28/2024    CALCIUM 8.2 (L) 07/23/2024    PH 7.323 (L) 10/04/2013    PH 7.375 10/04/2013    MG 2.0 08/28/2024    MG 2.2 03/16/2024    ALBUMIN 3.7 08/28/2024    ALBUMIN 3.6 07/23/2024    PROT 7.0 08/28/2024    PROT 6.9 07/23/2024    ALKPHOS 47 (L) 08/28/2024    ALKPHOS 46 (L) 07/23/2024    ALT 19 08/28/2024    ALT 19 07/23/2024    AST 20 08/28/2024    AST 20 07/23/2024    BILITOT 0.3 08/28/2024    BILITOT 0.4 07/23/2024        CARDIAC PROFILE - LAST 2  Lab Results   Component Value Date    BNP 16 03/27/2024    BNP 21 03/16/2024     03/27/2024    CPK  201 (H) 02/03/2021    CPKMB 2.2 06/02/2019    CPKMB 2.2 06/02/2019    TROPONINI <0.030 05/24/2022    TROPONINI <0.030 05/23/2022    TROPONINIHS 6.4 03/27/2024    TROPONINIHS 6.2 03/17/2024        COAGULATION - LAST 2  Lab Results   Component Value Date    LABPT 15.4 (H) 05/23/2022    INR 1.0 03/16/2024    INR 1.3 05/23/2022    APTT 24.9 10/03/2013    APTT 27.5 04/17/2013       ENDOCRINE & PSA - LAST 2  Lab Results   Component Value Date    HGBA1C 5.7 (H) 07/23/2024    HGBA1C 6.0 08/21/2020    TSH 1.970 08/21/2020    TSH 1.550 03/21/2017        ECHOCARDIOGRAM RESULTS  Results for orders placed during the hospital encounter of 05/23/22    Echo Saline Bubble? No    Interpretation Summary  · Normal central venous pressure (3 mmHg).  · The left ventricle is normal in size with low normal systolic function.  · Normal left ventricular diastolic function.  · The estimated ejection fraction is 52%.  · Atrial fibrillation not observed.  · Normal right ventricular size with normal right ventricular systolic function.  · There is no pulmonary hypertension.  · Aortic valve is probably tricuspid but cannot exclude bicuspid valve-no aortic regurgitation or stenosis      CURRENT/PREVIOUS VISIT EKG  Results for orders placed or performed during the hospital encounter of 03/27/24   EKG 12-lead    Collection Time: 03/27/24  9:37 AM   Result Value Ref Range    QRS Duration 82 ms    [facility] QTC Calculation 436 ms    Narrative    Test Reason : R06.02,    Vent. Rate : 073 BPM     Atrial Rate : 073 BPM     P-R Int : 160 ms          QRS Dur : 082 ms      QT Int : 396 ms       P-R-T Axes : 045 061 067 degrees     QTc Int : 436 ms    Normal sinus rhythm  Normal ECG  When compared with ECG of 16-MAR-2024 20:44,  No significant change was found  Confirmed by Chavo ROJAS, Joe GARZON ([id number]) on 4/13/2024 4:11:07 PM    Referred By: [email or web address]   SELF           Confirmed By:Joe Tony MD     No valid procedures specified.   Results for orders placed  during the hospital encounter of 05/23/22    Nuclear Stress Test    Interpretation Summary    The nuclear portion of this study will be reported separately.    The EKG portion of this study is negative for ischemia.    The patient reported no chest pain during the stress test.    There were no arrhythmias during stress.    No valid procedures specified.  MRI of the cervical spine  IMPRESSION:  1. Straightening of the normal cervical spinal curvature, which could  be positional, or reflective of paraspinal muscular spasm.  2. Multilevel cervical disc bulging, with no focal disc herniation or  significant spinal canal stenosis at any level.     Electronically Signed by Tucker ARNDT on 8/21/2020 8:19 AM               PREVIOUS STRESS TEST              PREVIOUS ANGIOGRAM    B. HEMODYNAMIC RESULTS:     LVEDP (Pre): 9 mmHg   LVEDP (Post): 12 mmHg   Ejection Fraction: 60%     C. ANGIOGRAPHIC RESULTS:     DIAGNOSTIC:       Patient has a right dominant coronary artery.        - Left Main Coronary Artery:              The LM is normal. There is HAYDEE 3 flow. Abberant origin of the L main from the R cusp.        - Left Anterior Descending Artery:              The LAD is normal. There is HAYDEE 3 flow.        - Left Circumflex Artery:              The LCX is normal. There is HAYDEE 3 flow.        - Right Coronary Artery:              The RCA is normal. There is HAYDEE 3 flow.        - Aortic Root:              The Aortic Root is normal. There is HAYDEE 3 flow.        - Abdominal Aorta:              The supra renal abdominal aorta is normal.        - Left Renal Artery:              The left renal is normal.        - Right Renal Artery:              The right renal is normal.        - Common Femoral Artery:              The right CFA is normal.       D. SUMMARY:     1. 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   2. Normal ventriculogram.       PHYSICAL EXAM    GENERAL: well built, well nourished,  well-developed in no apparent distress alert and oriented.   HEENT: Normocephalic. Pupils normal and conjunctivae normal.  Mucous membranes normal, no cyanosis or icterus, trachea central,no pallor or icterus is noted..   NECK: No JVD. No bruit..   THYROID: Thyroid not enlarged. No nodules present..   CARDIAC: Regular rate and rhythm. S1 is normal.S2 is normal.No gallops, clicks or murmurs noted at this time.  CHEST ANATOMY: normal.   LUNGS: Clear to auscultation. No wheezing or rhonchi..   ABDOMEN: Soft no masses or organomegaly.  No abdomen pulsations or bruits.  Normal bowel sounds. No pulsations and no masses felt, No guarding or rebound.   EXTREMITIES: No cyanosis, clubbing or edema noted at this time., no calf tenderness bilaterally.   PERIPHERAL VASCULAR SYSTEM: Good palpable distal pulses.   CENTRAL NERVOUS SYSTEM: No focal motor or sensory deficits noted.   SKIN: Skin without lesions, moist, well perfused.   MUSCLE STRENGTH & TONE: No noteable weakness, atrophy or abnormal movement.     I HAVE REVIEWED :    The vital signs, nurses notes, and all the pertinent radiology and labs.    09/09/2024 EKG shows sinus bradycardia rate of 54 beats per minute otherwise within normal limits      Current Outpatient Medications   Medication Instructions    ascorbic acid (vitamin C) (VITAMIN C) 1,000 mg, Oral, Daily    aspirin (ECOTRIN) 81 mg, Oral, Daily    omega 3-dha-epa-fish oil 100-150-750 mg Cap 2 capsules, Oral, 2 times daily    omega-3 fatty acids/fish oil (FISH OIL-OMEGA-3 FATTY ACIDS) 300-1,000 mg capsule 1 capsule, Oral, Daily          Assessment & Plan     1. S/P CABG x 2  Overview:  DATE OF SURGERY:  10/04/2013     ATTENDING SURGEON:  TIA Posadas M.D.     PREOPERATIVE DIAGNOSES:  1.  Anomalous left main coronary artery.  2.  Anemia.  3.  Sleep apnea.     POSTOPERATIVE DIAGNOSES:  1.  Anomalous left main coronary artery.  2.  Anemia.  3.  Sleep apnea.     OPERATION PERFORMED:  Coronary artery bypass  grafting x2 with left internal   mammary artery to the left anterior descending and saphenous vein graft to the   obtuse marginal.            2. Coronary artery disease involving coronary bypass graft of native heart, unspecified whether angina present  -     Ambulatory referral/consult to Cardiology    3. 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  She had LIMA to LAD and a saphenous vein graft to the marginal branch.  Presently she was having increasing symptoms in the upper extremities but admits to having intermittent episodes of substernal chest pain.  As has been almost 10 years since revascularization recommend the following  1. Obtain a symptom limited stress test or Lexiscan Myoview to evaluate for any ischemia.    2. Consider obtaining a CTA of the coronary arteries.    3. Regardless continue on aggressive risk factor modification.  4. Maintain on aspirin therapy   5. The goal is to keep her LDL cholesterol 70 or less and triglycerides less than 150.        4. Mixed hyperlipidemia  Assessment & Plan:  Recommend more aggressive risk factor modification   Latest Reference Range & Units 07/23/24 09:47   Cholesterol Total 120 - 199 mg/dL 167   HDL 40 - 75 mg/dL 60   HDL/Cholesterol Ratio 20.0 - 50.0 % 35.9   Non-HDL Cholesterol mg/dL 107   Total Cholesterol/HDL Ratio 2.0 - 5.0  2.8   Triglycerides 30 - 150 mg/dL 92   LDL Cholesterol 63.0 - 159.0 mg/dL 88.6     Encouraged her to get on a strict diet and re-evaluate the lipid levels in about 3 months' time if it is not better in the LDL cholesterol is not below 70 ideally below 60 then she should be on statin therapy.      5. Other forms of angina pectoris  Assessment & Plan:  Recurrent episodes of chest pain and arm pains recommend further cardiac evaluation.  Lexiscan Myoview to evaluate for any evidence of coronary insufficiency and  echocardiogram for LV function assessment      6. Neck pain  Assessment & Plan:  She has disc bulging at multiple levels in MRI.  Recommend to repeat the MRI to assess the extent of cervical changes that could be accounting for symptoms of severe pain bilaterally.      7. Class 2 severe obesity due to excess calories with serious comorbidity in adult, unspecified BMI  Assessment & Plan:  BMI is 33.05 kilograms/meter squared calorie restricted diet and increased physical activity             Follow up in about 6 weeks (around 10/21/2024).

## 2024-09-10 LAB
OHS QRS DURATION: 92 MS
OHS QTC CALCULATION: 434 MS

## 2024-09-25 ENCOUNTER — TELEPHONE (OUTPATIENT)
Dept: CARDIOLOGY | Facility: HOSPITAL | Age: 64
End: 2024-09-25

## 2024-09-25 NOTE — TELEPHONE ENCOUNTER
Left message on voicemail.     Patient advised, test will be at [facility] ([address]   Will need to register on the first floor at the main entrance.   Patient advised that arrival time is 7:20am.  Patient advised that she may be here about 3.5-4 hours, and may want to bring something to occupy their time, as there will be periods of waiting.    Patient advised, may take her medications prior to testing if you need to.  Advised if she needs to eat to take her medications, please keep it light, like toast and juice.    Patient advised to avoid all caffeine 12 hours prior to testing.  This includes decaf tea and coffee.    Will provide peanut butter crackers for a snack after stress test.  If patient would prefer something else, please bring a snack from home.    Wear comfortable clothing.   No lotions, oils, or powders to the upper chest area. May wear deodorant.    No metal jewelry, buttons, or zippers to the upper body.  Advised to call the office if any questions.

## 2024-09-26 ENCOUNTER — TELEPHONE (OUTPATIENT)
Dept: FAMILY MEDICINE | Facility: CLINIC | Age: 64
End: 2024-09-26
Payer: OTHER GOVERNMENT

## 2024-09-26 NOTE — TELEPHONE ENCOUNTER
----- Message from Justen Reyez MD sent at 9/26/2024  8:04 AM CDT -----  One short run of junctional versus VT. follow-up with Dr. Vasquez as recommended.

## 2024-10-24 ENCOUNTER — HOSPITAL ENCOUNTER (OUTPATIENT)
Facility: HOSPITAL | Age: 64
Discharge: HOME | End: 2024-10-25
Attending: STUDENT IN AN ORGANIZED HEALTH CARE EDUCATION/TRAINING PROGRAM | Admitting: STUDENT IN AN ORGANIZED HEALTH CARE EDUCATION/TRAINING PROGRAM
Payer: OTHER GOVERNMENT

## 2024-10-24 DIAGNOSIS — K80.50 BILIARY COLIC: Primary | ICD-10-CM

## 2024-10-24 DIAGNOSIS — R10.9 ABDOMINAL PAIN: ICD-10-CM

## 2024-10-24 DIAGNOSIS — R10.31 RIGHT LOWER QUADRANT ABDOMINAL PAIN: ICD-10-CM

## 2024-10-24 LAB
ALBUMIN SERPL BCP-MCNC: 3.9 G/DL (ref 3.5–5.2)
ALP SERPL-CCNC: 50 U/L (ref 40–150)
ALT SERPL W/O P-5'-P-CCNC: 25 U/L (ref 10–44)
ANION GAP SERPL CALC-SCNC: 12 MMOL/L (ref 8–16)
AST SERPL-CCNC: 19 U/L (ref 10–40)
BASOPHILS # BLD AUTO: 0.03 K/UL (ref 0–0.2)
BASOPHILS NFR BLD: 0.7 % (ref 0–1.9)
BILIRUB SERPL-MCNC: 0.8 MG/DL (ref 0.1–1)
BILIRUB UR QL STRIP: NEGATIVE
BUN SERPL-MCNC: 13 MG/DL (ref 8–23)
CALCIUM SERPL-MCNC: 8.6 MG/DL (ref 8.7–10.5)
CHLORIDE SERPL-SCNC: 105 MMOL/L (ref 95–110)
CLARITY UR: CLEAR
CO2 SERPL-SCNC: 22 MMOL/L (ref 23–29)
COLOR UR: YELLOW
CREAT SERPL-MCNC: 0.9 MG/DL (ref 0.5–1.4)
DIFFERENTIAL METHOD BLD: ABNORMAL
EOSINOPHIL # BLD AUTO: 0 K/UL (ref 0–0.5)
EOSINOPHIL NFR BLD: 0.7 % (ref 0–8)
ERYTHROCYTE [DISTWIDTH] IN BLOOD BY AUTOMATED COUNT: 15.5 % (ref 11.5–14.5)
EST. GFR  (NO RACE VARIABLE): >60 ML/MIN/1.73 M^2
GLUCOSE SERPL-MCNC: 88 MG/DL (ref 70–110)
GLUCOSE UR QL STRIP: NEGATIVE
HCT VFR BLD AUTO: 40.8 % (ref 37–48.5)
HCV AB SERPL QL IA: NEGATIVE
HGB BLD-MCNC: 12.3 G/DL (ref 12–16)
HGB UR QL STRIP: NEGATIVE
HIV 1+2 AB+HIV1 P24 AG SERPL QL IA: NEGATIVE
IMM GRANULOCYTES # BLD AUTO: 0.01 K/UL (ref 0–0.04)
IMM GRANULOCYTES NFR BLD AUTO: 0.2 % (ref 0–0.5)
INFLUENZA A, MOLECULAR: NEGATIVE
INFLUENZA B, MOLECULAR: NEGATIVE
KETONES UR QL STRIP: NEGATIVE
LEUKOCYTE ESTERASE UR QL STRIP: NEGATIVE
LIPASE SERPL-CCNC: 18 U/L (ref 4–60)
LYMPHOCYTES # BLD AUTO: 0.5 K/UL (ref 1–4.8)
LYMPHOCYTES NFR BLD: 10.1 % (ref 18–48)
MCH RBC QN AUTO: 20.6 PG (ref 27–31)
MCHC RBC AUTO-ENTMCNC: 30.1 G/DL (ref 32–36)
MCV RBC AUTO: 68 FL (ref 82–98)
MONOCYTES # BLD AUTO: 0.3 K/UL (ref 0.3–1)
MONOCYTES NFR BLD: 7.2 % (ref 4–15)
NEUTROPHILS # BLD AUTO: 3.6 K/UL (ref 1.8–7.7)
NEUTROPHILS NFR BLD: 81.1 % (ref 38–73)
NITRITE UR QL STRIP: NEGATIVE
NRBC BLD-RTO: 0 /100 WBC
PH UR STRIP: 7 [PH] (ref 5–8)
PLATELET # BLD AUTO: 199 K/UL (ref 150–450)
PMV BLD AUTO: 10.8 FL (ref 9.2–12.9)
POTASSIUM SERPL-SCNC: 3.9 MMOL/L (ref 3.5–5.1)
PROT SERPL-MCNC: 7.2 G/DL (ref 6–8.4)
PROT UR QL STRIP: NEGATIVE
RBC # BLD AUTO: 5.98 M/UL (ref 4–5.4)
SARS-COV-2 RDRP RESP QL NAA+PROBE: NEGATIVE
SODIUM SERPL-SCNC: 139 MMOL/L (ref 136–145)
SP GR UR STRIP: 1.02 (ref 1–1.03)
SPECIMEN SOURCE: NORMAL
URN SPEC COLLECT METH UR: ABNORMAL
UROBILINOGEN UR STRIP-ACNC: ABNORMAL EU/DL
WBC # BLD AUTO: 4.44 K/UL (ref 3.9–12.7)

## 2024-10-24 PROCEDURE — 63600175 PHARM REV CODE 636 W HCPCS: Performed by: STUDENT IN AN ORGANIZED HEALTH CARE EDUCATION/TRAINING PROGRAM

## 2024-10-24 PROCEDURE — 94760 N-INVAS EAR/PLS OXIMETRY 1: CPT

## 2024-10-24 PROCEDURE — 83690 ASSAY OF LIPASE: CPT | Performed by: PHYSICIAN ASSISTANT

## 2024-10-24 PROCEDURE — 96375 TX/PRO/DX INJ NEW DRUG ADDON: CPT

## 2024-10-24 PROCEDURE — 96374 THER/PROPH/DIAG INJ IV PUSH: CPT | Mod: 59

## 2024-10-24 PROCEDURE — 36415 COLL VENOUS BLD VENIPUNCTURE: CPT | Performed by: EMERGENCY MEDICINE

## 2024-10-24 PROCEDURE — 86803 HEPATITIS C AB TEST: CPT | Performed by: EMERGENCY MEDICINE

## 2024-10-24 PROCEDURE — 87635 SARS-COV-2 COVID-19 AMP PRB: CPT | Performed by: STUDENT IN AN ORGANIZED HEALTH CARE EDUCATION/TRAINING PROGRAM

## 2024-10-24 PROCEDURE — 85025 COMPLETE CBC W/AUTO DIFF WBC: CPT | Performed by: PHYSICIAN ASSISTANT

## 2024-10-24 PROCEDURE — 99285 EMERGENCY DEPT VISIT HI MDM: CPT | Mod: 25

## 2024-10-24 PROCEDURE — 25000003 PHARM REV CODE 250: Performed by: STUDENT IN AN ORGANIZED HEALTH CARE EDUCATION/TRAINING PROGRAM

## 2024-10-24 PROCEDURE — 87502 INFLUENZA DNA AMP PROBE: CPT | Performed by: STUDENT IN AN ORGANIZED HEALTH CARE EDUCATION/TRAINING PROGRAM

## 2024-10-24 PROCEDURE — 80053 COMPREHEN METABOLIC PANEL: CPT | Performed by: PHYSICIAN ASSISTANT

## 2024-10-24 PROCEDURE — 25500020 PHARM REV CODE 255

## 2024-10-24 PROCEDURE — 87389 HIV-1 AG W/HIV-1&-2 AB AG IA: CPT | Performed by: EMERGENCY MEDICINE

## 2024-10-24 PROCEDURE — 81003 URINALYSIS AUTO W/O SCOPE: CPT | Performed by: PHYSICIAN ASSISTANT

## 2024-10-24 RX ORDER — HYDROMORPHONE HYDROCHLORIDE 1 MG/ML
0.5 INJECTION, SOLUTION INTRAMUSCULAR; INTRAVENOUS; SUBCUTANEOUS
Status: COMPLETED | OUTPATIENT
Start: 2024-10-24 | End: 2024-10-24

## 2024-10-24 RX ORDER — ONDANSETRON HYDROCHLORIDE 2 MG/ML
4 INJECTION, SOLUTION INTRAVENOUS
Status: COMPLETED | OUTPATIENT
Start: 2024-10-24 | End: 2024-10-24

## 2024-10-24 RX ORDER — FAMOTIDINE 10 MG/ML
20 INJECTION INTRAVENOUS
Status: COMPLETED | OUTPATIENT
Start: 2024-10-24 | End: 2024-10-24

## 2024-10-24 RX ADMIN — IOHEXOL 100 ML: 350 INJECTION, SOLUTION INTRAVENOUS at 10:10

## 2024-10-24 RX ADMIN — ONDANSETRON 4 MG: 2 INJECTION INTRAMUSCULAR; INTRAVENOUS at 10:10

## 2024-10-24 RX ADMIN — HYDROMORPHONE HYDROCHLORIDE 0.5 MG: 1 INJECTION, SOLUTION INTRAMUSCULAR; INTRAVENOUS; SUBCUTANEOUS at 10:10

## 2024-10-24 RX ADMIN — FAMOTIDINE 20 MG: 10 INJECTION, SOLUTION INTRAVENOUS at 10:10

## 2024-10-25 ENCOUNTER — TELEPHONE (OUTPATIENT)
Dept: SURGERY | Facility: CLINIC | Age: 64
End: 2024-10-25
Payer: OTHER GOVERNMENT

## 2024-10-25 ENCOUNTER — PATIENT MESSAGE (OUTPATIENT)
Dept: SURGERY | Facility: CLINIC | Age: 64
End: 2024-10-25
Payer: OTHER GOVERNMENT

## 2024-10-25 VITALS
HEART RATE: 67 BPM | TEMPERATURE: 99 F | SYSTOLIC BLOOD PRESSURE: 132 MMHG | HEIGHT: 67 IN | WEIGHT: 214 LBS | RESPIRATION RATE: 20 BRPM | DIASTOLIC BLOOD PRESSURE: 64 MMHG | OXYGEN SATURATION: 97 % | BODY MASS INDEX: 33.59 KG/M2

## 2024-10-25 PROBLEM — D21.9 FIBROIDS: Status: ACTIVE | Noted: 2024-10-25

## 2024-10-25 PROBLEM — N85.8 UTERINE MASS: Status: ACTIVE | Noted: 2024-10-25

## 2024-10-25 PROBLEM — K80.20 CHOLELITHIASIS: Status: ACTIVE | Noted: 2024-10-25

## 2024-10-25 PROBLEM — R10.31 INTRACTABLE RIGHT LOWER QUADRANT ABDOMINAL PAIN: Status: ACTIVE | Noted: 2024-10-25

## 2024-10-25 LAB
ANION GAP SERPL CALC-SCNC: 9 MMOL/L (ref 8–16)
BASOPHILS # BLD AUTO: 0.03 K/UL (ref 0–0.2)
BASOPHILS NFR BLD: 0.9 % (ref 0–1.9)
BUN SERPL-MCNC: 10 MG/DL (ref 8–23)
CALCIUM SERPL-MCNC: 8.4 MG/DL (ref 8.7–10.5)
CHLORIDE SERPL-SCNC: 105 MMOL/L (ref 95–110)
CO2 SERPL-SCNC: 23 MMOL/L (ref 23–29)
CREAT SERPL-MCNC: 0.7 MG/DL (ref 0.5–1.4)
DIFFERENTIAL METHOD BLD: ABNORMAL
EOSINOPHIL # BLD AUTO: 0 K/UL (ref 0–0.5)
EOSINOPHIL NFR BLD: 0.3 % (ref 0–8)
ERYTHROCYTE [DISTWIDTH] IN BLOOD BY AUTOMATED COUNT: 15.2 % (ref 11.5–14.5)
EST. GFR  (NO RACE VARIABLE): >60 ML/MIN/1.73 M^2
GLUCOSE SERPL-MCNC: 98 MG/DL (ref 70–110)
HCT VFR BLD AUTO: 38 % (ref 37–48.5)
HGB BLD-MCNC: 11.6 G/DL (ref 12–16)
IMM GRANULOCYTES # BLD AUTO: 0.01 K/UL (ref 0–0.04)
IMM GRANULOCYTES NFR BLD AUTO: 0.3 % (ref 0–0.5)
LYMPHOCYTES # BLD AUTO: 0.4 K/UL (ref 1–4.8)
LYMPHOCYTES NFR BLD: 11.9 % (ref 18–48)
MAGNESIUM SERPL-MCNC: 1.8 MG/DL (ref 1.6–2.6)
MCH RBC QN AUTO: 20.6 PG (ref 27–31)
MCHC RBC AUTO-ENTMCNC: 30.5 G/DL (ref 32–36)
MCV RBC AUTO: 67 FL (ref 82–98)
MONOCYTES # BLD AUTO: 0.4 K/UL (ref 0.3–1)
MONOCYTES NFR BLD: 13.1 % (ref 4–15)
NEUTROPHILS # BLD AUTO: 2.5 K/UL (ref 1.8–7.7)
NEUTROPHILS NFR BLD: 73.5 % (ref 38–73)
NRBC BLD-RTO: 0 /100 WBC
PHOSPHATE SERPL-MCNC: 3.4 MG/DL (ref 2.7–4.5)
PLATELET # BLD AUTO: 192 K/UL (ref 150–450)
PMV BLD AUTO: 10.9 FL (ref 9.2–12.9)
POTASSIUM SERPL-SCNC: 3.8 MMOL/L (ref 3.5–5.1)
RBC # BLD AUTO: 5.64 M/UL (ref 4–5.4)
SODIUM SERPL-SCNC: 137 MMOL/L (ref 136–145)
WBC # BLD AUTO: 3.37 K/UL (ref 3.9–12.7)

## 2024-10-25 PROCEDURE — 25000003 PHARM REV CODE 250: Performed by: STUDENT IN AN ORGANIZED HEALTH CARE EDUCATION/TRAINING PROGRAM

## 2024-10-25 PROCEDURE — 36415 COLL VENOUS BLD VENIPUNCTURE: CPT

## 2024-10-25 PROCEDURE — 96376 TX/PRO/DX INJ SAME DRUG ADON: CPT

## 2024-10-25 PROCEDURE — 84100 ASSAY OF PHOSPHORUS: CPT

## 2024-10-25 PROCEDURE — 93010 ELECTROCARDIOGRAM REPORT: CPT | Mod: ,,, | Performed by: GENERAL PRACTICE

## 2024-10-25 PROCEDURE — 93005 ELECTROCARDIOGRAM TRACING: CPT

## 2024-10-25 PROCEDURE — 80048 BASIC METABOLIC PNL TOTAL CA: CPT

## 2024-10-25 PROCEDURE — 85025 COMPLETE CBC W/AUTO DIFF WBC: CPT

## 2024-10-25 PROCEDURE — G0378 HOSPITAL OBSERVATION PER HR: HCPCS

## 2024-10-25 PROCEDURE — 83735 ASSAY OF MAGNESIUM: CPT

## 2024-10-25 PROCEDURE — 63600175 PHARM REV CODE 636 W HCPCS: Performed by: STUDENT IN AN ORGANIZED HEALTH CARE EDUCATION/TRAINING PROGRAM

## 2024-10-25 PROCEDURE — 25000003 PHARM REV CODE 250

## 2024-10-25 PROCEDURE — 96375 TX/PRO/DX INJ NEW DRUG ADDON: CPT

## 2024-10-25 RX ORDER — SODIUM CHLORIDE 0.9 % (FLUSH) 0.9 %
10 SYRINGE (ML) INJECTION EVERY 12 HOURS PRN
Status: DISCONTINUED | OUTPATIENT
Start: 2024-10-25 | End: 2024-10-25 | Stop reason: HOSPADM

## 2024-10-25 RX ORDER — MELOXICAM 15 MG/1
15 TABLET ORAL DAILY PRN
Status: ON HOLD
Start: 2024-10-25

## 2024-10-25 RX ORDER — MELOXICAM 15 MG/1
15 TABLET ORAL DAILY
COMMUNITY
Start: 2024-09-04 | End: 2024-10-25

## 2024-10-25 RX ORDER — TALC
9 POWDER (GRAM) TOPICAL NIGHTLY PRN
Status: DISCONTINUED | OUTPATIENT
Start: 2024-10-25 | End: 2024-10-25 | Stop reason: HOSPADM

## 2024-10-25 RX ORDER — LANOLIN ALCOHOL/MO/W.PET/CERES
800 CREAM (GRAM) TOPICAL
Status: DISCONTINUED | OUTPATIENT
Start: 2024-10-25 | End: 2024-10-25 | Stop reason: HOSPADM

## 2024-10-25 RX ORDER — ENOXAPARIN SODIUM 100 MG/ML
40 INJECTION SUBCUTANEOUS EVERY 24 HOURS
Status: DISCONTINUED | OUTPATIENT
Start: 2024-10-25 | End: 2024-10-25 | Stop reason: HOSPADM

## 2024-10-25 RX ORDER — SODIUM CHLORIDE 9 MG/ML
INJECTION, SOLUTION INTRAVENOUS ONCE
Status: DISCONTINUED | OUTPATIENT
Start: 2024-10-25 | End: 2024-10-25

## 2024-10-25 RX ORDER — HYDROCODONE BITARTRATE AND ACETAMINOPHEN 5; 325 MG/1; MG/1
1 TABLET ORAL EVERY 6 HOURS PRN
Status: DISCONTINUED | OUTPATIENT
Start: 2024-10-25 | End: 2024-10-25 | Stop reason: HOSPADM

## 2024-10-25 RX ORDER — ACETAMINOPHEN 325 MG/1
650 TABLET ORAL EVERY 4 HOURS PRN
Status: DISCONTINUED | OUTPATIENT
Start: 2024-10-25 | End: 2024-10-25 | Stop reason: HOSPADM

## 2024-10-25 RX ORDER — IBUPROFEN 200 MG
16 TABLET ORAL
Status: DISCONTINUED | OUTPATIENT
Start: 2024-10-25 | End: 2024-10-25

## 2024-10-25 RX ORDER — KETOROLAC TROMETHAMINE 30 MG/ML
15 INJECTION, SOLUTION INTRAMUSCULAR; INTRAVENOUS
Status: COMPLETED | OUTPATIENT
Start: 2024-10-25 | End: 2024-10-25

## 2024-10-25 RX ORDER — NALOXONE HCL 0.4 MG/ML
0.02 VIAL (ML) INJECTION
Status: DISCONTINUED | OUTPATIENT
Start: 2024-10-25 | End: 2024-10-25 | Stop reason: HOSPADM

## 2024-10-25 RX ORDER — HYDROMORPHONE HYDROCHLORIDE 1 MG/ML
1 INJECTION, SOLUTION INTRAMUSCULAR; INTRAVENOUS; SUBCUTANEOUS EVERY 4 HOURS PRN
Status: DISCONTINUED | OUTPATIENT
Start: 2024-10-25 | End: 2024-10-25 | Stop reason: HOSPADM

## 2024-10-25 RX ORDER — ONDANSETRON HYDROCHLORIDE 2 MG/ML
4 INJECTION, SOLUTION INTRAVENOUS EVERY 6 HOURS PRN
Status: DISCONTINUED | OUTPATIENT
Start: 2024-10-25 | End: 2024-10-25 | Stop reason: HOSPADM

## 2024-10-25 RX ORDER — IBUPROFEN 200 MG
24 TABLET ORAL
Status: DISCONTINUED | OUTPATIENT
Start: 2024-10-25 | End: 2024-10-25

## 2024-10-25 RX ORDER — HYDROMORPHONE HYDROCHLORIDE 1 MG/ML
0.5 INJECTION, SOLUTION INTRAMUSCULAR; INTRAVENOUS; SUBCUTANEOUS EVERY 4 HOURS PRN
Status: DISCONTINUED | OUTPATIENT
Start: 2024-10-25 | End: 2024-10-25

## 2024-10-25 RX ORDER — GLUCAGON 1 MG
1 KIT INJECTION
Status: DISCONTINUED | OUTPATIENT
Start: 2024-10-25 | End: 2024-10-25

## 2024-10-25 RX ORDER — ALUMINUM HYDROXIDE, MAGNESIUM HYDROXIDE, AND SIMETHICONE 1200; 120; 1200 MG/30ML; MG/30ML; MG/30ML
30 SUSPENSION ORAL 4 TIMES DAILY PRN
Status: DISCONTINUED | OUTPATIENT
Start: 2024-10-25 | End: 2024-10-25 | Stop reason: HOSPADM

## 2024-10-25 RX ORDER — PROCHLORPERAZINE EDISYLATE 5 MG/ML
5 INJECTION INTRAMUSCULAR; INTRAVENOUS EVERY 6 HOURS PRN
Status: DISCONTINUED | OUTPATIENT
Start: 2024-10-25 | End: 2024-10-25 | Stop reason: HOSPADM

## 2024-10-25 RX ORDER — NAPROXEN SODIUM 220 MG/1
81 TABLET, FILM COATED ORAL DAILY
Status: DISCONTINUED | OUTPATIENT
Start: 2024-10-25 | End: 2024-10-25 | Stop reason: HOSPADM

## 2024-10-25 RX ORDER — TIZANIDINE 4 MG/1
4 TABLET ORAL NIGHTLY PRN
Status: ON HOLD | COMMUNITY
Start: 2024-09-05

## 2024-10-25 RX ORDER — HYDROMORPHONE HYDROCHLORIDE 1 MG/ML
0.5 INJECTION, SOLUTION INTRAMUSCULAR; INTRAVENOUS; SUBCUTANEOUS
Status: COMPLETED | OUTPATIENT
Start: 2024-10-25 | End: 2024-10-25

## 2024-10-25 RX ADMIN — HYDROMORPHONE HYDROCHLORIDE 0.5 MG: 1 INJECTION, SOLUTION INTRAMUSCULAR; INTRAVENOUS; SUBCUTANEOUS at 01:10

## 2024-10-25 RX ADMIN — ASPIRIN 81 MG CHEWABLE TABLET 81 MG: 81 TABLET CHEWABLE at 09:10

## 2024-10-25 RX ADMIN — ACETAMINOPHEN 650 MG: 325 TABLET ORAL at 10:10

## 2024-10-25 RX ADMIN — KETOROLAC TROMETHAMINE 15 MG: 30 INJECTION, SOLUTION INTRAMUSCULAR; INTRAVENOUS at 04:10

## 2024-10-25 NOTE — TELEPHONE ENCOUNTER
----- Message from  Chikis sent at 10/25/2024 12:39 PM CDT -----  Regarding: hospital follow up  Good morning,    Pt discharged from [facility] today.  Please contact pt to schedule follow up appointment.    Thank you,  Chikis

## 2024-10-30 LAB
OHS QRS DURATION: 90 MS
OHS QTC CALCULATION: 414 MS

## 2024-11-01 ENCOUNTER — TELEPHONE (OUTPATIENT)
Dept: CARDIOLOGY | Facility: HOSPITAL | Age: 64
End: 2024-11-01

## 2024-11-02 ENCOUNTER — HOSPITAL ENCOUNTER (OUTPATIENT)
Facility: HOSPITAL | Age: 64
Discharge: HOME | End: 2024-11-03
Attending: STUDENT IN AN ORGANIZED HEALTH CARE EDUCATION/TRAINING PROGRAM | Admitting: STUDENT IN AN ORGANIZED HEALTH CARE EDUCATION/TRAINING PROGRAM
Payer: OTHER GOVERNMENT

## 2024-11-02 DIAGNOSIS — R07.9 CHEST PAIN: ICD-10-CM

## 2024-11-02 DIAGNOSIS — R07.9 CHEST PAIN, UNSPECIFIED TYPE: Primary | ICD-10-CM

## 2024-11-02 PROBLEM — R07.89 ATYPICAL CHEST PAIN: Status: ACTIVE | Noted: 2017-03-21

## 2024-11-02 LAB
ALBUMIN SERPL BCP-MCNC: 4 G/DL (ref 3.5–5.2)
ALP SERPL-CCNC: 44 U/L (ref 55–135)
ALT SERPL W/O P-5'-P-CCNC: 40 U/L (ref 10–44)
ANION GAP SERPL CALC-SCNC: 8 MMOL/L (ref 8–16)
AST SERPL-CCNC: 24 U/L (ref 10–40)
BASOPHILS # BLD AUTO: 0.03 K/UL (ref 0–0.2)
BASOPHILS NFR BLD: 0.5 % (ref 0–1.9)
BILIRUB SERPL-MCNC: 0.7 MG/DL (ref 0.1–1)
BILIRUB UR QL STRIP: NEGATIVE
BNP SERPL-MCNC: 43 PG/ML (ref 0–99)
BUN SERPL-MCNC: 8 MG/DL (ref 8–23)
CALCIUM SERPL-MCNC: 8.7 MG/DL (ref 8.7–10.5)
CHLORIDE SERPL-SCNC: 104 MMOL/L (ref 95–110)
CLARITY UR: CLEAR
CO2 SERPL-SCNC: 26 MMOL/L (ref 23–29)
COLOR UR: COLORLESS
CREAT SERPL-MCNC: 0.7 MG/DL (ref 0.5–1.4)
D DIMER PPP IA.FEU-MCNC: 0.57 MG/L FEU (ref 0–0.49)
DIFFERENTIAL METHOD BLD: ABNORMAL
EOSINOPHIL # BLD AUTO: 0.1 K/UL (ref 0–0.5)
EOSINOPHIL NFR BLD: 1.5 % (ref 0–8)
ERYTHROCYTE [DISTWIDTH] IN BLOOD BY AUTOMATED COUNT: 14.8 % (ref 11.5–14.5)
EST. GFR  (NO RACE VARIABLE): >60 ML/MIN/1.73 M^2
GLUCOSE SERPL-MCNC: 96 MG/DL (ref 70–110)
GLUCOSE UR QL STRIP: NEGATIVE
HCT VFR BLD AUTO: 37.8 % (ref 37–48.5)
HGB BLD-MCNC: 11.5 G/DL (ref 12–16)
HGB UR QL STRIP: NEGATIVE
IMM GRANULOCYTES # BLD AUTO: 0.1 K/UL (ref 0–0.04)
IMM GRANULOCYTES NFR BLD AUTO: 1.7 % (ref 0–0.5)
KETONES UR QL STRIP: ABNORMAL
LEUKOCYTE ESTERASE UR QL STRIP: NEGATIVE
LYMPHOCYTES # BLD AUTO: 0.8 K/UL (ref 1–4.8)
LYMPHOCYTES NFR BLD: 13.8 % (ref 18–48)
MAGNESIUM SERPL-MCNC: 2.2 MG/DL (ref 1.6–2.6)
MCH RBC QN AUTO: 20.1 PG (ref 27–31)
MCHC RBC AUTO-ENTMCNC: 30.4 G/DL (ref 32–36)
MCV RBC AUTO: 66 FL (ref 82–98)
MONOCYTES # BLD AUTO: 0.2 K/UL (ref 0.3–1)
MONOCYTES NFR BLD: 3.9 % (ref 4–15)
NEUTROPHILS # BLD AUTO: 4.7 K/UL (ref 1.8–7.7)
NEUTROPHILS NFR BLD: 78.6 % (ref 38–73)
NITRITE UR QL STRIP: NEGATIVE
NRBC BLD-RTO: 0 /100 WBC
PH UR STRIP: 8 [PH] (ref 5–8)
PLATELET # BLD AUTO: 306 K/UL (ref 150–450)
PMV BLD AUTO: 10.5 FL (ref 9.2–12.9)
POTASSIUM SERPL-SCNC: 3.8 MMOL/L (ref 3.5–5.1)
PROT SERPL-MCNC: 7.3 G/DL (ref 6–8.4)
PROT UR QL STRIP: NEGATIVE
RBC # BLD AUTO: 5.73 M/UL (ref 4–5.4)
SARS-COV-2 RDRP RESP QL NAA+PROBE: NEGATIVE
SODIUM SERPL-SCNC: 138 MMOL/L (ref 136–145)
SP GR UR STRIP: 1.01 (ref 1–1.03)
TROPONIN I SERPL HS-MCNC: 6.3 PG/ML (ref 0–14.9)
TROPONIN I SERPL HS-MCNC: 6.7 PG/ML (ref 0–14.9)
TSH SERPL DL<=0.005 MIU/L-ACNC: 1.72 UIU/ML (ref 0.34–5.6)
URN SPEC COLLECT METH UR: ABNORMAL
UROBILINOGEN UR STRIP-ACNC: NEGATIVE EU/DL
WBC # BLD AUTO: 5.94 K/UL (ref 3.9–12.7)

## 2024-11-02 PROCEDURE — G0378 HOSPITAL OBSERVATION PER HR: HCPCS

## 2024-11-02 PROCEDURE — 80053 COMPREHEN METABOLIC PANEL: CPT | Performed by: STUDENT IN AN ORGANIZED HEALTH CARE EDUCATION/TRAINING PROGRAM

## 2024-11-02 PROCEDURE — 85025 COMPLETE CBC W/AUTO DIFF WBC: CPT | Performed by: STUDENT IN AN ORGANIZED HEALTH CARE EDUCATION/TRAINING PROGRAM

## 2024-11-02 PROCEDURE — 87635 SARS-COV-2 COVID-19 AMP PRB: CPT | Performed by: STUDENT IN AN ORGANIZED HEALTH CARE EDUCATION/TRAINING PROGRAM

## 2024-11-02 PROCEDURE — 84484 ASSAY OF TROPONIN QUANT: CPT | Performed by: STUDENT IN AN ORGANIZED HEALTH CARE EDUCATION/TRAINING PROGRAM

## 2024-11-02 PROCEDURE — 94761 N-INVAS EAR/PLS OXIMETRY MLT: CPT

## 2024-11-02 PROCEDURE — 85379 FIBRIN DEGRADATION QUANT: CPT | Performed by: STUDENT IN AN ORGANIZED HEALTH CARE EDUCATION/TRAINING PROGRAM

## 2024-11-02 PROCEDURE — 25000003 PHARM REV CODE 250: Performed by: STUDENT IN AN ORGANIZED HEALTH CARE EDUCATION/TRAINING PROGRAM

## 2024-11-02 PROCEDURE — 81003 URINALYSIS AUTO W/O SCOPE: CPT | Performed by: STUDENT IN AN ORGANIZED HEALTH CARE EDUCATION/TRAINING PROGRAM

## 2024-11-02 PROCEDURE — 25000242 PHARM REV CODE 250 ALT 637 W/ HCPCS: Performed by: STUDENT IN AN ORGANIZED HEALTH CARE EDUCATION/TRAINING PROGRAM

## 2024-11-02 PROCEDURE — 63600175 PHARM REV CODE 636 W HCPCS: Performed by: STUDENT IN AN ORGANIZED HEALTH CARE EDUCATION/TRAINING PROGRAM

## 2024-11-02 PROCEDURE — 93010 ELECTROCARDIOGRAM REPORT: CPT | Mod: ,,, | Performed by: INTERNAL MEDICINE

## 2024-11-02 PROCEDURE — 93005 ELECTROCARDIOGRAM TRACING: CPT | Performed by: INTERNAL MEDICINE

## 2024-11-02 PROCEDURE — 84443 ASSAY THYROID STIM HORMONE: CPT | Performed by: STUDENT IN AN ORGANIZED HEALTH CARE EDUCATION/TRAINING PROGRAM

## 2024-11-02 PROCEDURE — 99285 EMERGENCY DEPT VISIT HI MDM: CPT | Mod: 25

## 2024-11-02 PROCEDURE — 83880 ASSAY OF NATRIURETIC PEPTIDE: CPT | Performed by: STUDENT IN AN ORGANIZED HEALTH CARE EDUCATION/TRAINING PROGRAM

## 2024-11-02 PROCEDURE — 96374 THER/PROPH/DIAG INJ IV PUSH: CPT

## 2024-11-02 PROCEDURE — 25500020 PHARM REV CODE 255: Performed by: STUDENT IN AN ORGANIZED HEALTH CARE EDUCATION/TRAINING PROGRAM

## 2024-11-02 PROCEDURE — 83735 ASSAY OF MAGNESIUM: CPT | Performed by: STUDENT IN AN ORGANIZED HEALTH CARE EDUCATION/TRAINING PROGRAM

## 2024-11-02 RX ORDER — SODIUM,POTASSIUM PHOSPHATES 280-250MG
2 POWDER IN PACKET (EA) ORAL
Status: DISCONTINUED | OUTPATIENT
Start: 2024-11-02 | End: 2024-11-03 | Stop reason: HOSPADM

## 2024-11-02 RX ORDER — IBUPROFEN 200 MG
24 TABLET ORAL
Status: DISCONTINUED | OUTPATIENT
Start: 2024-11-02 | End: 2024-11-03 | Stop reason: HOSPADM

## 2024-11-02 RX ORDER — ASPIRIN 81 MG/1
81 TABLET ORAL DAILY
Status: DISCONTINUED | OUTPATIENT
Start: 2024-11-03 | End: 2024-11-03 | Stop reason: HOSPADM

## 2024-11-02 RX ORDER — LANOLIN ALCOHOL/MO/W.PET/CERES
800 CREAM (GRAM) TOPICAL
Status: DISCONTINUED | OUTPATIENT
Start: 2024-11-02 | End: 2024-11-03 | Stop reason: HOSPADM

## 2024-11-02 RX ORDER — TALC
9 POWDER (GRAM) TOPICAL NIGHTLY PRN
Status: DISCONTINUED | OUTPATIENT
Start: 2024-11-02 | End: 2024-11-03 | Stop reason: HOSPADM

## 2024-11-02 RX ORDER — COLCHICINE 0.6 MG/1
0.6 TABLET, FILM COATED ORAL 2 TIMES DAILY
Status: DISCONTINUED | OUTPATIENT
Start: 2024-11-02 | End: 2024-11-03 | Stop reason: HOSPADM

## 2024-11-02 RX ORDER — IBUPROFEN 200 MG
600 TABLET ORAL EVERY 6 HOURS PRN
Status: DISCONTINUED | OUTPATIENT
Start: 2024-11-02 | End: 2024-11-03 | Stop reason: HOSPADM

## 2024-11-02 RX ORDER — ASPIRIN 325 MG
325 TABLET ORAL
Status: COMPLETED | OUTPATIENT
Start: 2024-11-02 | End: 2024-11-02

## 2024-11-02 RX ORDER — NITROGLYCERIN 0.4 MG/1
0.4 TABLET SUBLINGUAL ONCE
Status: COMPLETED | OUTPATIENT
Start: 2024-11-02 | End: 2024-11-02

## 2024-11-02 RX ORDER — ENOXAPARIN SODIUM 100 MG/ML
40 INJECTION SUBCUTANEOUS EVERY 24 HOURS
Status: DISCONTINUED | OUTPATIENT
Start: 2024-11-03 | End: 2024-11-03 | Stop reason: HOSPADM

## 2024-11-02 RX ORDER — ONDANSETRON HYDROCHLORIDE 2 MG/ML
4 INJECTION, SOLUTION INTRAVENOUS EVERY 8 HOURS PRN
Status: DISCONTINUED | OUTPATIENT
Start: 2024-11-02 | End: 2024-11-03 | Stop reason: HOSPADM

## 2024-11-02 RX ORDER — ACETAMINOPHEN 325 MG/1
650 TABLET ORAL EVERY 4 HOURS PRN
Status: DISCONTINUED | OUTPATIENT
Start: 2024-11-02 | End: 2024-11-03 | Stop reason: HOSPADM

## 2024-11-02 RX ORDER — NALOXONE HCL 0.4 MG/ML
0.02 VIAL (ML) INJECTION
Status: DISCONTINUED | OUTPATIENT
Start: 2024-11-02 | End: 2024-11-03 | Stop reason: HOSPADM

## 2024-11-02 RX ORDER — IBUPROFEN 200 MG
16 TABLET ORAL
Status: DISCONTINUED | OUTPATIENT
Start: 2024-11-02 | End: 2024-11-03 | Stop reason: HOSPADM

## 2024-11-02 RX ORDER — GLUCAGON 1 MG
1 KIT INJECTION
Status: DISCONTINUED | OUTPATIENT
Start: 2024-11-02 | End: 2024-11-03 | Stop reason: HOSPADM

## 2024-11-02 RX ORDER — KETOROLAC TROMETHAMINE 30 MG/ML
15 INJECTION, SOLUTION INTRAMUSCULAR; INTRAVENOUS
Status: COMPLETED | OUTPATIENT
Start: 2024-11-02 | End: 2024-11-02

## 2024-11-02 RX ORDER — IBUPROFEN 200 MG
400 TABLET ORAL EVERY 6 HOURS PRN
Status: DISCONTINUED | OUTPATIENT
Start: 2024-11-02 | End: 2024-11-03 | Stop reason: HOSPADM

## 2024-11-02 RX ORDER — SODIUM CHLORIDE 0.9 % (FLUSH) 0.9 %
2 SYRINGE (ML) INJECTION EVERY 8 HOURS PRN
Status: DISCONTINUED | OUTPATIENT
Start: 2024-11-02 | End: 2024-11-03 | Stop reason: HOSPADM

## 2024-11-02 RX ORDER — AMOXICILLIN 250 MG
1 CAPSULE ORAL 2 TIMES DAILY
Status: DISCONTINUED | OUTPATIENT
Start: 2024-11-02 | End: 2024-11-03 | Stop reason: HOSPADM

## 2024-11-02 RX ORDER — FAMOTIDINE 20 MG/1
20 TABLET, FILM COATED ORAL 2 TIMES DAILY
Status: DISCONTINUED | OUTPATIENT
Start: 2024-11-02 | End: 2024-11-03 | Stop reason: HOSPADM

## 2024-11-02 RX ORDER — LIDOCAINE 50 MG/G
1 PATCH TOPICAL ONCE
Status: COMPLETED | OUTPATIENT
Start: 2024-11-02 | End: 2024-11-03

## 2024-11-02 RX ADMIN — KETOROLAC TROMETHAMINE 15 MG: 30 INJECTION, SOLUTION INTRAMUSCULAR at 06:11

## 2024-11-02 RX ADMIN — LIDOCAINE 1 PATCH: 50 PATCH TOPICAL at 06:11

## 2024-11-02 RX ADMIN — NITROGLYCERIN 0.4 MG: 0.4 TABLET SUBLINGUAL at 09:11

## 2024-11-02 RX ADMIN — IOHEXOL 100 ML: 350 INJECTION, SOLUTION INTRAVENOUS at 08:11

## 2024-11-02 RX ADMIN — ASPIRIN 325 MG ORAL TABLET 325 MG: 325 PILL ORAL at 06:11
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
## 2024-11-03 ENCOUNTER — CLINICAL SUPPORT (OUTPATIENT)
Dept: CARDIOLOGY | Facility: HOSPITAL | Age: 64
End: 2024-11-03
Attending: STUDENT IN AN ORGANIZED HEALTH CARE EDUCATION/TRAINING PROGRAM
Payer: OTHER GOVERNMENT

## 2024-11-03 ENCOUNTER — HOSPITAL ENCOUNTER (OUTPATIENT)
Dept: RADIOLOGY | Facility: HOSPITAL | Age: 64
Discharge: HOME | End: 2024-11-03
Attending: STUDENT IN AN ORGANIZED HEALTH CARE EDUCATION/TRAINING PROGRAM | Admitting: STUDENT IN AN ORGANIZED HEALTH CARE EDUCATION/TRAINING PROGRAM
Payer: OTHER GOVERNMENT

## 2024-11-03 VITALS
RESPIRATION RATE: 17 BRPM | WEIGHT: 212.75 LBS | SYSTOLIC BLOOD PRESSURE: 115 MMHG | DIASTOLIC BLOOD PRESSURE: 75 MMHG | BODY MASS INDEX: 32.24 KG/M2 | OXYGEN SATURATION: 98 % | HEART RATE: 63 BPM | HEIGHT: 68 IN | TEMPERATURE: 97 F

## 2024-11-03 VITALS — BODY MASS INDEX: 33.27 KG/M2 | WEIGHT: 212 LBS | HEIGHT: 67 IN

## 2024-11-03 LAB
ANION GAP SERPL CALC-SCNC: 8 MMOL/L (ref 8–16)
AORTIC ROOT ANNULUS: 3 CM
AORTIC VALVE CUSP SEPERATION: 2.4 CM
APICAL FOUR CHAMBER EJECTION FRACTION: 61 %
APICAL TWO CHAMBER EJECTION FRACTION: 67 %
AV INDEX (PROSTH): 0.98
AV MEAN GRADIENT: 3 MMHG
AV PEAK GRADIENT: 5.8 MMHG
AV VALVE AREA BY VELOCITY RATIO: 2.6 CM²
AV VALVE AREA: 3.1 CM²
AV VELOCITY RATIO: 0.83
BASOPHILS # BLD AUTO: 0.03 K/UL (ref 0–0.2)
BASOPHILS NFR BLD: 0.7 % (ref 0–1.9)
BSA FOR ECHO PROCEDURE: 2.13 M2
BUN SERPL-MCNC: 9 MG/DL (ref 8–23)
CALCIUM SERPL-MCNC: 8.2 MG/DL (ref 8.7–10.5)
CHLORIDE SERPL-SCNC: 104 MMOL/L (ref 95–110)
CO2 SERPL-SCNC: 26 MMOL/L (ref 23–29)
CREAT SERPL-MCNC: 0.6 MG/DL (ref 0.5–1.4)
CRP SERPL-MCNC: 3.8 MG/DL
CV ECHO LV RWT: 0.43 CM
CV PHARM DOSE: 0.4 MG
CV STRESS BASE HR: 64 BPM
DIASTOLIC BLOOD PRESSURE: 55 MMHG
DIFFERENTIAL METHOD BLD: ABNORMAL
DOP CALC AO PEAK VEL: 1.2 M/S
DOP CALC AO VTI: 25.6 CM
DOP CALC LVOT AREA: 3.1 CM2
DOP CALC LVOT DIAMETER: 2 CM
DOP CALC LVOT PEAK VEL: 1 M/S
DOP CALC LVOT STROKE VOLUME: 78.5 CM3
DOP CALC MV VTI: 40.1 CM
DOP CALCLVOT PEAK VEL VTI: 25 CM
E WAVE DECELERATION TIME: 233 MSEC
E/A RATIO: 1.49
E/E' RATIO: 13.38 M/S
ECHO LV POSTERIOR WALL: 0.9 CM (ref 0.6–1.1)
EOSINOPHIL # BLD AUTO: 0.1 K/UL (ref 0–0.5)
EOSINOPHIL NFR BLD: 3.1 % (ref 0–8)
ERYTHROCYTE [DISTWIDTH] IN BLOOD BY AUTOMATED COUNT: 14.7 % (ref 11.5–14.5)
ERYTHROCYTE [SEDIMENTATION RATE] IN BLOOD BY WESTERGREN METHOD: 21 MM/HR (ref 0–20)
EST. GFR  (NO RACE VARIABLE): >60 ML/MIN/1.73 M^2
FERRITIN SERPL-MCNC: 141.3 NG/ML (ref 20–300)
FRACTIONAL SHORTENING: 45.2 % (ref 28–44)
GLUCOSE SERPL-MCNC: 85 MG/DL (ref 70–110)
HCT VFR BLD AUTO: 34.3 % (ref 37–48.5)
HGB BLD-MCNC: 10.4 G/DL (ref 12–16)
IMM GRANULOCYTES # BLD AUTO: 0.06 K/UL (ref 0–0.04)
IMM GRANULOCYTES NFR BLD AUTO: 1.3 % (ref 0–0.5)
INTERVENTRICULAR SEPTUM: 1.2 CM (ref 0.6–1.1)
IRON SERPL-MCNC: 28 UG/DL (ref 30–160)
LEFT ATRIUM AREA SYSTOLIC (APICAL 2 CHAMBER): 14.9 CM2
LEFT ATRIUM AREA SYSTOLIC (APICAL 4 CHAMBER): 11.6 CM2
LEFT ATRIUM SIZE: 3.4 CM
LEFT ATRIUM VOLUME INDEX MOD: 14.7 ML/M2
LEFT ATRIUM VOLUME MOD: 30.5 ML
LEFT INTERNAL DIMENSION IN SYSTOLE: 2.3 CM (ref 2.1–4)
LEFT VENTRICLE DIASTOLIC VOLUME INDEX: 38.17 ML/M2
LEFT VENTRICLE DIASTOLIC VOLUME: 79.02 ML
LEFT VENTRICLE END DIASTOLIC VOLUME APICAL 2 CHAMBER: 81.9 ML
LEFT VENTRICLE END DIASTOLIC VOLUME APICAL 4 CHAMBER: 76.9 ML
LEFT VENTRICLE END SYSTOLIC VOLUME APICAL 2 CHAMBER: 38.1 ML
LEFT VENTRICLE END SYSTOLIC VOLUME APICAL 4 CHAMBER: 24.2 ML
LEFT VENTRICLE MASS INDEX: 71 G/M2
LEFT VENTRICLE SYSTOLIC VOLUME INDEX: 8.9 ML/M2
LEFT VENTRICLE SYSTOLIC VOLUME: 18.32 ML
LEFT VENTRICULAR INTERNAL DIMENSION IN DIASTOLE: 4.2 CM (ref 3.5–6)
LEFT VENTRICULAR MASS: 147 G
LV LATERAL E/E' RATIO: 10.7 M/S
LV SEPTAL E/E' RATIO: 17.83 M/S
LVED V (TEICH): 79.02 ML
LVES V (TEICH): 18.32 ML
LVOT MG: 2 MMHG
LVOT MV: 0.66 CM/S
LYMPHOCYTES # BLD AUTO: 1 K/UL (ref 1–4.8)
LYMPHOCYTES NFR BLD: 21.7 % (ref 18–48)
MAGNESIUM SERPL-MCNC: 2.2 MG/DL (ref 1.6–2.6)
MCH RBC QN AUTO: 19.8 PG (ref 27–31)
MCHC RBC AUTO-ENTMCNC: 30.3 G/DL (ref 32–36)
MCV RBC AUTO: 66 FL (ref 82–98)
MONOCYTES # BLD AUTO: 0.3 K/UL (ref 0.3–1)
MONOCYTES NFR BLD: 6.6 % (ref 4–15)
MV MEAN GRADIENT: 2 MMHG
MV PEAK A VEL: 0.72 M/S
MV PEAK E VEL: 1.07 M/S
MV PEAK GRADIENT: 4 MMHG
MV STENOSIS PRESSURE HALF TIME: 68 MS
MV VALVE AREA BY CONTINUITY EQUATION: 1.96 CM2
MV VALVE AREA P 1/2 METHOD: 3.24 CM2
NEUTROPHILS # BLD AUTO: 3.1 K/UL (ref 1.8–7.7)
NEUTROPHILS NFR BLD: 66.6 % (ref 38–73)
NRBC BLD-RTO: 0 /100 WBC
OHS CV CPX 1 MINUTE RECOVERY HEART RATE: 85 BPM
OHS CV CPX 85 PERCENT MAX PREDICTED HEART RATE MALE: 134
OHS CV CPX MAX PREDICTED HEART RATE: 158
OHS CV CPX PATIENT IS FEMALE: 1
OHS CV CPX PATIENT IS MALE: 0
OHS CV CPX PEAK DIASTOLIC BLOOD PRESSURE: 65 MMHG
OHS CV CPX PEAK HEAR RATE: 94 BPM
OHS CV CPX PEAK RATE PRESSURE PRODUCT: NORMAL
OHS CV CPX PEAK SYSTOLIC BLOOD PRESSURE: 139 MMHG
OHS CV CPX PERCENT MAX PREDICTED HEART RATE ACHIEVED: 62
OHS CV CPX RATE PRESSURE PRODUCT PRESENTING: 6464
OHS LV EJECTION FRACTION SIMPSONS BIPLANE MOD: 64 %
PHOSPHATE SERPL-MCNC: 4.2 MG/DL (ref 2.7–4.5)
PISA TR MAX VEL: 2.47 M/S
PLATELET # BLD AUTO: 263 K/UL (ref 150–450)
PLATELET BLD QL SMEAR: ABNORMAL
PMV BLD AUTO: 9.8 FL (ref 9.2–12.9)
POTASSIUM SERPL-SCNC: 3.6 MMOL/L (ref 3.5–5.1)
PROCALCITONIN SERPL IA-MCNC: 0.14 NG/ML (ref 0–0.5)
PV MV: 0.51 M/S
PV PEAK GRADIENT: 2 MMHG
PV PEAK VELOCITY: 0.77 M/S
RA PRESSURE ESTIMATED: 3 MMHG
RBC # BLD AUTO: 5.24 M/UL (ref 4–5.4)
RIGHT ATRIUM VOLUME AREA LENGTH APICAL 4 CHAMBER: 17.4 ML
RIGHT VENTRICULAR END-DIASTOLIC DIMENSION: 2.07 CM
RV TB RVSP: 5 MMHG
RV TISSUE DOPPLER FREE WALL SYSTOLIC VELOCITY 1 (APICAL 4 CHAMBER VIEW): 9.68 CM/S
SATURATED IRON: 11 % (ref 20–50)
SODIUM SERPL-SCNC: 138 MMOL/L (ref 136–145)
SYSTOLIC BLOOD PRESSURE: 101 MMHG
TDI LATERAL: 0.1 M/S
TDI SEPTAL: 0.06 M/S
TDI: 0.08 M/S
TOTAL IRON BINDING CAPACITY: 246 UG/DL (ref 250–450)
TR MAX PG: 24 MMHG
TRANSFERRIN SERPL-MCNC: 176 MG/DL (ref 200–375)
TRICUSPID ANNULAR PLANE SYSTOLIC EXCURSION: 2.65 CM
TV REST PULMONARY ARTERY PRESSURE: 27 MMHG
WBC # BLD AUTO: 4.57 K/UL (ref 3.9–12.7)
Z-SCORE OF LEFT VENTRICULAR DIMENSION IN END DIASTOLE: -4.04
Z-SCORE OF LEFT VENTRICULAR DIMENSION IN END SYSTOLE: -4.07

## 2024-11-03 PROCEDURE — 80048 BASIC METABOLIC PNL TOTAL CA: CPT | Performed by: STUDENT IN AN ORGANIZED HEALTH CARE EDUCATION/TRAINING PROGRAM

## 2024-11-03 PROCEDURE — 84145 PROCALCITONIN (PCT): CPT | Performed by: NURSE PRACTITIONER

## 2024-11-03 PROCEDURE — 93017 CV STRESS TEST TRACING ONLY: CPT

## 2024-11-03 PROCEDURE — 78452 HT MUSCLE IMAGE SPECT MULT: CPT | Mod: TC

## 2024-11-03 PROCEDURE — 93016 CV STRESS TEST SUPVJ ONLY: CPT | Mod: ,,, | Performed by: INTERNAL MEDICINE

## 2024-11-03 PROCEDURE — 99900031 HC PATIENT EDUCATION (STAT)

## 2024-11-03 PROCEDURE — 93306 TTE W/DOPPLER COMPLETE: CPT

## 2024-11-03 PROCEDURE — 25000003 PHARM REV CODE 250: Performed by: STUDENT IN AN ORGANIZED HEALTH CARE EDUCATION/TRAINING PROGRAM

## 2024-11-03 PROCEDURE — 84100 ASSAY OF PHOSPHORUS: CPT | Performed by: STUDENT IN AN ORGANIZED HEALTH CARE EDUCATION/TRAINING PROGRAM

## 2024-11-03 PROCEDURE — 93306 TTE W/DOPPLER COMPLETE: CPT | Mod: 26,,, | Performed by: INTERNAL MEDICINE

## 2024-11-03 PROCEDURE — 63600175 PHARM REV CODE 636 W HCPCS

## 2024-11-03 PROCEDURE — 86140 C-REACTIVE PROTEIN: CPT | Performed by: NURSE PRACTITIONER

## 2024-11-03 PROCEDURE — 83735 ASSAY OF MAGNESIUM: CPT | Performed by: STUDENT IN AN ORGANIZED HEALTH CARE EDUCATION/TRAINING PROGRAM

## 2024-11-03 PROCEDURE — 36415 COLL VENOUS BLD VENIPUNCTURE: CPT | Performed by: STUDENT IN AN ORGANIZED HEALTH CARE EDUCATION/TRAINING PROGRAM

## 2024-11-03 PROCEDURE — 94761 N-INVAS EAR/PLS OXIMETRY MLT: CPT

## 2024-11-03 PROCEDURE — 36415 COLL VENOUS BLD VENIPUNCTURE: CPT | Performed by: NURSE PRACTITIONER

## 2024-11-03 PROCEDURE — 85651 RBC SED RATE NONAUTOMATED: CPT | Performed by: NURSE PRACTITIONER

## 2024-11-03 PROCEDURE — 63600175 PHARM REV CODE 636 W HCPCS: Performed by: STUDENT IN AN ORGANIZED HEALTH CARE EDUCATION/TRAINING PROGRAM

## 2024-11-03 PROCEDURE — 25000003 PHARM REV CODE 250

## 2024-11-03 PROCEDURE — 85025 COMPLETE CBC W/AUTO DIFF WBC: CPT | Performed by: STUDENT IN AN ORGANIZED HEALTH CARE EDUCATION/TRAINING PROGRAM

## 2024-11-03 PROCEDURE — 83540 ASSAY OF IRON: CPT | Performed by: NURSE PRACTITIONER

## 2024-11-03 PROCEDURE — G0378 HOSPITAL OBSERVATION PER HR: HCPCS

## 2024-11-03 PROCEDURE — 82728 ASSAY OF FERRITIN: CPT | Performed by: NURSE PRACTITIONER

## 2024-11-03 PROCEDURE — 93018 CV STRESS TEST I&R ONLY: CPT | Mod: ,,, | Performed by: INTERNAL MEDICINE

## 2024-11-03 PROCEDURE — 96375 TX/PRO/DX INJ NEW DRUG ADDON: CPT

## 2024-11-03 PROCEDURE — A9502 TC99M TETROFOSMIN: HCPCS

## 2024-11-03 RX ORDER — REGADENOSON 0.08 MG/ML
0.4 INJECTION, SOLUTION INTRAVENOUS
Status: CANCELLED | OUTPATIENT
Start: 2024-11-03

## 2024-11-03 RX ORDER — FAMOTIDINE 20 MG/1
20 TABLET, FILM COATED ORAL 2 TIMES DAILY
Qty: 60 TABLET | Refills: 0 | Status: SHIPPED | OUTPATIENT
Start: 2024-11-03 | End: 2024-12-03

## 2024-11-03 RX ORDER — REGADENOSON 0.08 MG/ML
0.4 INJECTION, SOLUTION INTRAVENOUS ONCE
Status: COMPLETED | OUTPATIENT
Start: 2024-11-03 | End: 2024-11-03

## 2024-11-03 RX ORDER — OXYCODONE AND ACETAMINOPHEN 5; 325 MG/1; MG/1
1 TABLET ORAL EVERY 6 HOURS PRN
Status: DISCONTINUED | OUTPATIENT
Start: 2024-11-03 | End: 2024-11-03 | Stop reason: HOSPADM

## 2024-11-03 RX ORDER — COLCHICINE 0.6 MG/1
0.6 TABLET ORAL 2 TIMES DAILY
Qty: 60 TABLET | Refills: 2 | Status: SHIPPED | OUTPATIENT
Start: 2024-11-03 | End: 2025-02-01

## 2024-11-03 RX ORDER — IBUPROFEN 400 MG/1
TABLET ORAL
Qty: 63 TABLET | Refills: 0 | Status: SHIPPED | OUTPATIENT
Start: 2024-11-03 | End: 2024-11-24

## 2024-11-03 RX ORDER — DIPHENHYDRAMINE HCL 25 MG
25 CAPSULE ORAL ONCE
Status: COMPLETED | OUTPATIENT
Start: 2024-11-03 | End: 2024-11-03

## 2024-11-03 RX ADMIN — ONDANSETRON 4 MG: 2 INJECTION INTRAMUSCULAR; INTRAVENOUS at 11:11

## 2024-11-03 RX ADMIN — COLCHICINE 0.6 MG: 0.6 TABLET, FILM COATED ORAL at 08:11

## 2024-11-03 RX ADMIN — FAMOTIDINE 20 MG: 20 TABLET ORAL at 12:11

## 2024-11-03 RX ADMIN — TETROFOSMIN 8 MILLICURIE: 0.23 INJECTION, POWDER, LYOPHILIZED, FOR SOLUTION INTRAVENOUS at 10:11

## 2024-11-03 RX ADMIN — REGADENOSON 0.4 MG: 0.08 INJECTION, SOLUTION INTRAVENOUS at 12:11

## 2024-11-03 RX ADMIN — IBUPROFEN 600 MG: 200 TABLET, FILM COATED ORAL at 12:11

## 2024-11-03 RX ADMIN — FAMOTIDINE 20 MG: 20 TABLET ORAL at 09:11

## 2024-11-03 RX ADMIN — OXYCODONE HYDROCHLORIDE AND ACETAMINOPHEN 1 TABLET: 5; 325 TABLET ORAL at 08:11

## 2024-11-03 RX ADMIN — SODIUM CHLORIDE 250 ML: 9 INJECTION, SOLUTION INTRAVENOUS at 11:11

## 2024-11-03 RX ADMIN — Medication 9 MG: at 12:11

## 2024-11-03 RX ADMIN — DIPHENHYDRAMINE HYDROCHLORIDE 25 MG: 25 CAPSULE ORAL at 01:11

## 2024-11-03 RX ADMIN — SODIUM CHLORIDE 250 ML: 9 INJECTION, SOLUTION INTRAVENOUS at 12:11

## 2024-11-03 RX ADMIN — OXYCODONE HYDROCHLORIDE AND ACETAMINOPHEN 1 TABLET: 5; 325 TABLET ORAL at 01:11

## 2024-11-03 RX ADMIN — TETROFOSMIN 27.1 MILLICURIE: 0.23 INJECTION, POWDER, LYOPHILIZED, FOR SOLUTION INTRAVENOUS at 12:11

## 2024-11-03 RX ADMIN — ASPIRIN 81 MG: 81 TABLET, COATED ORAL at 08:11

## 2024-11-03 RX ADMIN — POTASSIUM BICARBONATE 50 MEQ: 978 TABLET, EFFERVESCENT ORAL at 12:11

## 2024-11-03 RX ADMIN — SENNOSIDES AND DOCUSATE SODIUM 1 TABLET: 8.6; 5 TABLET ORAL at 08:11

## 2024-11-03 RX ADMIN — COLCHICINE 0.6 MG: 0.6 TABLET, FILM COATED ORAL at 12:11

## 2024-11-03 RX ADMIN — IBUPROFEN 600 MG: 200 TABLET, FILM COATED ORAL at 05:11

## 2024-11-03 NOTE — NURSING NOTE
Lexiscan portion of Stress Test completed without complications.  NP made aware of nausea and hypotension prior test. NS bolus given prior and during totaling 500cc. Zofran also given. Relief noted. Primary nurse updated.Patient verbalized understanding of pre-post test instructions. Discharged from stress lab per Cardiology

## 2024-11-03 NOTE — PLAN OF CARE
CM presented to patient's room to complete initial d/c assessment. CM was informed via staff and spouse that patient was in the midst of undergoing a ultrasound for cardiology reasons. CM was receptive and agreed to return shortly. Staff informed that following the ultra sound patient was scheduled for a stress test. CM was receptive and agreed to follow up with patient in the latter part of the day. Spouse Rip was in agreement.
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
## 2024-11-03 NOTE — PLAN OF CARE
[facility]  Initial Discharge Assessment       Primary Care Provider: Justen Reyez MD    Admission Diagnosis: Chest pain [R07.9]    Admission Date: 11/2/2024  Expected Discharge Date: 11/3/2024    Transition of Care Barriers: None    Payor:  / Plan:  [employer] / Product Type: Government /     Extended Emergency Contact Information  Primary Emergency Contact: Rip Salcido  Address: [address]           [place] [address] [facility]  Home Phone: [phone]  Mobile Phone: [phone]  Relation: Spouse    Discharge Plan A: Home with family  Discharge Plan B: Home with family      [id number] - [place] - [address] AT [facility] [address]  [facility] [place] [address]  Phone: [phone] Fax: [phone]      Initial Assessment (most recent)       Adult Discharge Assessment - 11/03/24 1441          Discharge Assessment    Assessment Type Discharge Planning Assessment     Confirmed/corrected address, phone number and insurance Yes     Confirmed Demographics Correct on Facesheet     Source of Information patient     When was your last doctors appointment? --   09/2024    Does patient/caregiver understand observation status Yes     Communicated MONSTER with patient/caregiver Yes     Reason For Admission Atypical Chest pain     People in Home spouse     Facility Arrived From: Home     Do you expect to return to your current living situation? Yes     Do you have help at home or someone to help you manage your care at home? Yes     Who are your caregiver(s) and their phone number(s)? Rip Salcido [phone]     Prior to hospitilization cognitive status: Alert/Oriented     Current cognitive status: Alert/Oriented     Walking or Climbing Stairs Difficulty no     Dressing/Bathing Difficulty no     Equipment Currently Used at Home blood pressure machine     Readmission within 30 days? No     Patient currently being  followed by outpatient case management? No     Do you currently have service(s) that help you manage your care at home? No     Do you take prescription medications? Yes     Do you have prescription coverage? Yes     Coverage [facility]     Do you have any problems affording any of your prescribed medications? No     Is the patient taking medications as prescribed? yes     Who is going to help you get home at discharge? Daughter     How do you get to doctors appointments? family or friend will provide     Are you on dialysis? No     Do you take coumadin? No     Discharge Plan A Home with family     Discharge Plan B Home with family     DME Needed Upon Discharge  none     Discharge Plan discussed with: Patient     Transition of Care Barriers None        Physical Activity    On average, how many days per week do you engage in moderate to strenuous exercise (like a brisk walk)? 0 days     On average, how many minutes do you engage in exercise at this level? 0 min        Financial Resource Strain    How hard is it for you to pay for the very basics like food, housing, medical care, and heating? Not very hard        Housing Stability    In the last 12 months, was there a time when you were not able to pay the mortgage or rent on time? No     At any time in the past 12 months, were you homeless or living in a shelter (including now)? No        Transportation Needs    Has the lack of transportation kept you from medical appointments, meetings, work or from getting things needed for daily living? No        Food Insecurity    Within the past 12 months, you worried that your food would run out before you got the money to buy more. Never true     Within the past 12 months, the food you bought just didn't last and you didn't have money to get more. Never true        Stress    Do you feel stress - tense, restless, nervous, or anxious, or unable to sleep at night because your mind is troubled all the time - these  days? Not at all        Social Isolation    How often do you feel lonely or isolated from those around you?  Never        Alcohol Use    Q1: How often do you have a drink containing alcohol? Never     Q2: How many drinks containing alcohol do you have on a typical day when you are drinking? Patient does not drink     Q3: How often do you have six or more drinks on one occasion? Never        Utilities    In the past 12 months has the electric, gas, oil, or water company threatened to shut off services in your home? No        Health Literacy    How often do you need to have someone help you when you read instructions, pamphlets, or other written material from your doctor or pharmacy? Never        OTHER    Name(s) of People in Home Prem Salcido, spouse
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
## 2024-11-03 NOTE — ASSESSMENT & PLAN NOTE
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  EKG, troponins negative. Possible late vs atypical presentation of pericarditis.  Low suspicion of ACS    Ibuprofen 600mg Q6H PRN  Colchicine  Continue telemetry  Serial EKG   Cardiology consult in AM

## 2024-11-03 NOTE — RESPIRATORY THERAPY
11/03/24 0005   Patient Assessment/Suction   Level of Consciousness (AVPU) alert   PRE-TX-O2   Device (Oxygen Therapy) room air   SpO2 97 %   Pulse Oximetry Type Intermittent   $ Pulse Oximetry - Multiple Charge Pulse Oximetry - Multiple   Education   $ Education 15 min
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
## 2024-11-03 NOTE — PLAN OF CARE
11/03/24 1456   BERMUDEZ Message   Medicare Outpatient and Observation Notification regarding financial responsibility Given to patient/caregiver;Explained to patient/caregiver;Signed/date by patient/caregiver   Date BERMUDEZ was signed 11/03/24   Time BERMUDEZ was signed 1526

## 2024-11-03 NOTE — PLAN OF CARE
Problem: Adult Inpatient Plan of Care  Goal: Plan of Care Review  Outcome: Progressing  Goal: Patient-Specific Goal (Individualized)  Outcome: Progressing  Goal: Absence of Hospital-Acquired Illness or Injury  Outcome: Progressing  Goal: Optimal Comfort and Wellbeing  Outcome: Progressing  Goal: Readiness for Transition of Care  Outcome: Progressing     Problem: Chest Pain  Goal: Resolution of Chest Pain Symptoms  Outcome: Progressing     Problem: Fall Injury Risk  Goal: Absence of Fall and Fall-Related Injury  Outcome: Progressing     Problem: Pain Acute  Goal: Optimal Pain Control and Function  Outcome: Progressing
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
## 2024-11-03 NOTE — HOSPITAL COURSE
Ms. Salcido has been monitored closely during her hospitalization. She was admitted on 11/2/24 with chest pain. Troponins were WNL. EKG NSR with no acute ischemic changes. Echo EF 60-65% with PASP 27mmHg with no pericardial effusion noted. NM stress test was negative for reversible ischemia. D dimer was checked in the ED and was 0.57 - which is NORMAL when ADJUSTED FOR AGE, but alerted as abnormal on the range from the lab, so pt had CTA chest that was negative for PE, but does show bilat atelectasis. CRP is mildly elevated at 3,  ESR minimally elevated at 21, and procal is WNL. She had a flu and covid test outpt within the last week that was negative. Chest pain is across her chest and radiates to both arms and is sometimes exertional and not reproducible. She has a history of pericarditis and symptoms were similar to that presentation so she was presumptively started on colchicine and ibuprofen. She reports some improvement of the pain with ibuprofen at home. Cardiology was consulted and she was cleared for discharge by cardiology with close outpt follow up. Cardiology recommended continued treatment for pericarditis, and she will be discharged home with colchicine for 90 days and a 21 day taper of ibuprofen. She is instructed to call her cardiologist, Dr. AMAN Vasquez on Monday for a f/u appt. Of note, she was admitted last week with cholelithiasis, declined surgical consulted, and was referred to surgery outpt. GI source of pain is a consideration. There is no transaminitis during this hospitalization. She was seen and examined on the date of discharge. Strict return prxn provided.

## 2024-11-03 NOTE — SUBJECTIVE & OBJECTIVE
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 Parrino [facility]    cyst removed from right breast      LASER LAPAROSCOPY      TUBAL LIGATION         Review of patient's allergies indicates:   Allergen Reactions    Opioids - morphine analogues     Codeine Rash       No current facility-administered medications on file prior to encounter.     Current Outpatient Medications on File Prior to Encounter   Medication Sig    aspirin (ECOTRIN) 81 MG EC tablet Take 81 mg by mouth once daily.    meloxicam (MOBIC) 15 MG tablet Take 1 tablet (15 mg total) by mouth daily as needed for Pain.    tiZANidine (ZANAFLEX) 4 MG tablet Take 4 mg by mouth nightly as needed.     Family History       Problem Relation (Age of Onset)    Arrhythmia Mother    Asthma Sister    Breast cancer Sister (45)    Cancer Maternal Grandmother          Tobacco Use    Smoking status: Former     Current packs/day: 0.00     Types: Cigarettes     Quit date:      Years since quittin.8    Smokeless tobacco: Never   Substance and Sexual Activity    Alcohol use: Yes     Alcohol/week: 1.0 standard drink of alcohol     Types: 1 Glasses of wine per week     Comment: daily    Drug use: No    Sexual activity: Yes     Partners: Male     Review of Systems   Constitutional:  Positive for fatigue. Negative for chills.   HENT:  Negative for ear pain and sore throat.    Eyes:  Negative for redness and visual disturbance.   Respiratory:  Negative for cough, shortness of breath and wheezing.    Cardiovascular:  Positive for chest  pain. Negative for palpitations and leg swelling.   Gastrointestinal:  Positive for nausea. Negative for abdominal distention, diarrhea and vomiting.   Endocrine: Negative for polyuria.   Genitourinary:  Negative for dysuria.   Musculoskeletal:  Positive for back pain.   Neurological:  Negative for dizziness, tremors, weakness, numbness and headaches.   Psychiatric/Behavioral:  Negative for agitation.      Objective:     Vital Signs (Most Recent):  Temp: 98 °F (36.7 °C) (11/02/24 2302)  Pulse: 69 (11/02/24 2302)  Resp: 16 (11/02/24 2302)  BP: 130/69 (11/02/24 2302)  SpO2: 99 % (11/02/24 2302) Vital Signs (24h Range):  Temp:  [98 °F (36.7 °C)-99.8 °F (37.7 °C)] 98 °F (36.7 °C)  Pulse:  [65-82] 69  Resp:  [16-36] 16  SpO2:  [99 %-100 %] 99 %  BP: (130-190)/(65-91) 130/69     Weight: 97.1 kg (214 lb)  Body mass index is 33.52 kg/m².     Physical Exam  Constitutional:       Appearance: She is not toxic-appearing or diaphoretic.   HENT:      Head: Normocephalic and atraumatic.      Mouth/Throat:      Mouth: Mucous membranes are moist.      Pharynx: No oropharyngeal exudate or posterior oropharyngeal erythema.   Eyes:      General: No scleral icterus.     Extraocular Movements: Extraocular movements intact.   Cardiovascular:      Rate and Rhythm: Normal rate and regular rhythm.      Pulses: Normal pulses.      Heart sounds: Normal heart sounds. No murmur heard.  Pulmonary:      Effort: No respiratory distress.      Breath sounds: No wheezing.   Abdominal:      General: Abdomen is flat. There is no distension.      Palpations: Abdomen is soft.      Tenderness: There is no abdominal tenderness.   Musculoskeletal:      Right lower leg: No edema.      Left lower leg: No edema.   Skin:     General: Skin is warm and dry.      Coloration: Skin is not jaundiced.   Neurological:      General: No focal deficit present.      Mental Status: She is alert and oriented to person, place, and time.   Psychiatric:         Mood and Affect:  Mood normal.         Behavior: Behavior normal.                Significant Labs: All pertinent labs within the past 24 hours have been reviewed.  BMP:   Recent Labs   Lab 11/02/24  1847   GLU 96      K 3.8      CO2 26   BUN 8   CREATININE 0.7   CALCIUM 8.7   MG 2.2     CBC:   Recent Labs   Lab 11/02/24  1847   WBC 5.94   HGB 11.5*   HCT 37.8        Cardiac Markers:   Recent Labs   Lab 11/02/24  1847   BNP 43       Significant Imaging: I have reviewed all pertinent imaging results/findings within the past 24 hours.

## 2024-11-03 NOTE — PLAN OF CARE
11/03/24 1458   Final Note   Assessment Type Final Discharge Note   Anticipated Discharge Disposition Home   What phone number can be called within the next 1-3 days to see how you are doing after discharge? [phone]   Post-Acute Status   Coverage  [employer]   Discharge Delays None known at this time     Patient cleared for discharge from case management standpoint.    Chart and discharge orders reviewed.  Patient discharged home with no further case management needs.

## 2024-11-04 LAB
OHS QRS DURATION: 86 MS
OHS QTC CALCULATION: 444 MS

## 2024-11-25 ENCOUNTER — TELEPHONE (OUTPATIENT)
Dept: FAMILY MEDICINE | Facility: CLINIC | Age: 64
End: 2024-11-25
Payer: OTHER GOVERNMENT

## 2024-12-18 ENCOUNTER — TELEPHONE (OUTPATIENT)
Dept: FAMILY MEDICINE | Facility: CLINIC | Age: 64
End: 2024-12-18
Payer: OTHER GOVERNMENT

## 2024-12-18 DIAGNOSIS — R05.9 COUGH, UNSPECIFIED TYPE: Primary | ICD-10-CM

## 2024-12-18 NOTE — TELEPHONE ENCOUNTER
Patient message    The patient went to [facility]. They told her she has  thickening of the air ways. She needs to see a Pulmonologist.     Patient is aware Dr. Reyez is out of the office.

## 2024-12-23 ENCOUNTER — TELEPHONE (OUTPATIENT)
Dept: FAMILY MEDICINE | Facility: CLINIC | Age: 64
End: 2024-12-23
Payer: OTHER GOVERNMENT

## 2025-01-21 ENCOUNTER — PATIENT MESSAGE (OUTPATIENT)
Dept: FAMILY MEDICINE | Facility: CLINIC | Age: 65
End: 2025-01-21

## 2025-02-07 NOTE — NURSING
Attempted to call but was not able to leave voicemail. Will try back at a later time.     lexiscan nuclear stress test completed with Dr. Orta. Report to follow.

## 2025-03-20 ENCOUNTER — OFFICE VISIT (OUTPATIENT)
Dept: FAMILY MEDICINE | Facility: CLINIC | Age: 65
End: 2025-03-20
Payer: OTHER GOVERNMENT

## 2025-03-20 VITALS
HEART RATE: 66 BPM | SYSTOLIC BLOOD PRESSURE: 137 MMHG | DIASTOLIC BLOOD PRESSURE: 85 MMHG | WEIGHT: 201.5 LBS | BODY MASS INDEX: 31.63 KG/M2 | OXYGEN SATURATION: 98 % | RESPIRATION RATE: 18 BRPM | HEIGHT: 67 IN | TEMPERATURE: 98 F

## 2025-03-20 DIAGNOSIS — R78.89 BLOOD D-DIMER ASSAY POSITIVE: ICD-10-CM

## 2025-03-20 DIAGNOSIS — E78.2 MIXED HYPERLIPIDEMIA: ICD-10-CM

## 2025-03-20 DIAGNOSIS — Z86.2 HISTORY OF ANEMIA: ICD-10-CM

## 2025-03-20 DIAGNOSIS — K76.0 FATTY LIVER: ICD-10-CM

## 2025-03-20 DIAGNOSIS — I25.810 CORONARY ARTERY DISEASE INVOLVING CORONARY BYPASS GRAFT OF NATIVE HEART, UNSPECIFIED WHETHER ANGINA PRESENT: Primary | ICD-10-CM

## 2025-03-20 PROCEDURE — 99999 PR PBB SHADOW E&M-EST. PATIENT-LVL IV: CPT | Mod: PBBFAC,,, | Performed by: FAMILY MEDICINE

## 2025-03-20 PROCEDURE — 99213 OFFICE O/P EST LOW 20 MIN: CPT | Mod: S$PBB,,, | Performed by: FAMILY MEDICINE

## 2025-03-20 PROCEDURE — 99214 OFFICE O/P EST MOD 30 MIN: CPT | Mod: PBBFAC,PN | Performed by: FAMILY MEDICINE

## 2025-03-20 NOTE — PROGRESS NOTES
Subjective:       Patient ID: Marie Salcido is a 64 y.o. female.    Chief Complaint: Follow-up (6 month f/u)      History of Present Illness    CHIEF COMPLAINT:  Ms. Saclido presents for follow-up on cholesterol and to check recent lab work.    HPI:  Ms. Salcido reports a recent episode of severe chest pain that required an ER visit due to an elevated D-dimer. The chest pain was not cardiac in nature but possibly pleurisy, though the exact diagnosis is uncertain. It was accompanied by severe back pain, nausea, and pain in both arms, and did not improve with ibuprofen. This incident occurred on November 2nd.    Ms. Salcido has a history of pericarditis and has undergone coronary artery bypass grafting (CABG). 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    Ms. Salcido reports occasional palpitations, which she describes as minor fibrillation. She has been managing her cholesterol through natural remedies such as garlic, albino, and turmeric, and has made dietary changes including eating cucumber apple salad with turmeric and paprika as a night snack.    Ms. Salcido is evaluated by a cardiologist, Dr. Reynolds, who is part of a cardiovascular group. She had an appointment with him and was also seen by him in the hospital. Ms. Salcido's last cholesterol check was in July of last year, and she is slightly overdue for her 6-month follow-up.    Ms. Salcido denies having stents or a heart attack during her recent ER visit. She also denies current symptoms of blood clots such as leg swelling, chest pain, or shortness of breath.    MEDICATIONS:  Ms. Salcido is on Aspirin, taking 1 daily for her heart condition.    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 She also has a history of  pericarditis and hypercholesterolemia.    SURGICAL HISTORY:  Ms. Salcido underwent a CABG procedure, during which 2 bypasses were performed. The date of the surgery was not specified.    TEST RESULTS:  Her cholesterol levels were checked in July last year, and the numbers were good. Triglycerides were 213 years ago, then decreased to 117, and most recently measured at 92. Ms. Salcido's A1C has been checked. D-dimer tests were normal in May 2022 and 5 years prior, but recently showed elevated levels.    SOCIAL HISTORY:   History: [email or web address] insurance      ROS:  Cardiovascular: +chest pain, +palpitations  Gastrointestinal: +nausea  Musculoskeletal: +back pain, +limb pain, +pain with movement          Allergies and Medications:   Review of patient's allergies indicates:   Allergen Reactions    Opioids - morphine analogues     Codeine Rash     Current Medications[1]    Family History:   Family History   Problem Relation Name Age of Onset    Arrhythmia Mother      Breast cancer Sister 3 45    Asthma Sister 3     Cancer Maternal Grandmother cervical     Diabetes Mellitus Neg Hx       Lab Results   Component Value Date    WBC 4.57 11/03/2024    HGB 10.4 (L) 11/03/2024    HCT 34.3 (L) 11/03/2024     11/03/2024    CHOL 167 07/23/2024    TRIG 92 07/23/2024    HDL 60 07/23/2024    ALT 40 11/02/2024    AST 24 11/02/2024     11/03/2024    K 3.6 11/03/2024     11/03/2024    CREATININE 0.6 11/03/2024    BUN 9 11/03/2024    CO2 26 11/03/2024    TSH 1.715 11/02/2024    INR 1.0 03/16/2024    HGBA1C 5.7 (H) 07/23/2024     Lab Results   Component Value Date    CHOL 167 07/23/2024    CHOL 160 05/18/2022    CHOL 159 08/20/2020     Lab Results   Component Value Date    HDL 60 07/23/2024    HDL 57 05/18/2022    HDL 47 08/20/2020     Lab Results   Component Value Date    LDLCALC 88.6 07/23/2024    LDLCALC 82 05/18/2022    LDLCALC 69.4 08/20/2020     Lab Results   Component Value Date    TRIG 92 07/23/2024    TRIG 117  05/18/2022    TRIG 213 (H) 08/20/2020       Lab Results   Component Value Date    CHOLHDL 35.9 07/23/2024    CHOLHDL 2.8 05/18/2022    CHOLHDL 29.6 08/20/2020       Wt Readings from Last 4 Encounters:   03/20/25 91.4 kg (201 lb 8 oz)   11/02/24 96.5 kg (212 lb 11.9 oz)   11/03/24 96.2 kg (212 lb)   10/24/24 97.1 kg (214 lb)           Social History:   Social History[2]        Objective:     Vitals:    03/20/25 1434   BP: 137/85   Pulse: 66   Resp: 18   Temp: 98.2 °F (36.8 °C)        Physical Exam    General: No acute distress. Well-developed. Well-nourished.  Eyes: EOMI. Sclerae anicteric.  HENT: Normocephalic. Atraumatic. Nares patent. Moist oral mucosa.  Cardiovascular: Regular rate. Regular rhythm. No murmurs. No rubs. No gallops. Normal S1, S2.  Respiratory: Normal respiratory effort. Clear to auscultation bilaterally. No rales. No rhonchi. No wheezing.  Musculoskeletal: No  obvious deformity.  Extremities: No lower extremity edema. Extremities symmetrically developed with trace peripheral edema.  Neurological: Alert & oriented x3. No slurred speech. Normal gait.  Psychiatric: Normal mood. Normal affect. Good insight. Good judgment.  Skin: Warm. Dry. No rash.            Assessment:       1. Coronary artery disease involving coronary bypass graft of native heart, unspecified whether angina present    2. Mixed hyperlipidemia    3. Fatty liver    4. Blood D-dimer assay positive    5. History of anemia        Plan:       Assessment & Plan    Q24.8 Other specified congenital malformations of heart  I25.89 Other forms of chronic ischemic heart disease  I48.91 Unspecified atrial fibrillation  E78.5 Hyperlipidemia, unspecified  R79.89 Other specified abnormal findings of blood chemistry  R60.0 Localized edema  I32 Pericarditis in diseases classified elsewhere  Z95.1 Presence of aortocoronary bypass graft    IMPRESSION:  - Reviewed history of CABG and congenital coronary disease.  - Noted reluctance to take statins due  to concerns about side effects.  - Considered alternative treatment options for managing cholesterol, including PCSK9 inhibitors.  - Assessed recent D-dimer levels, which were normal in May 2022.  - Evaluated report of occasional palpitations.    CONGENITAL HEART DISEASE:  - Noted the patient's history of congenital heart disease causing collapse of an artery supplying blood to the side of the heart.  - Acknowledged the patient's bypass surgery due to congenital heart condition.  - Recognized the potential for ongoing issues related to the congenital heart condition.    CHRONIC ISCHEMIC HEART DISEASE:  - Emphasized the importance of cholesterol management for patients with a history of coronary disease.  - Continued daily aspirin regimen.  - Referred the patient to cardiologist Dr. Reynolds for management of coronary disease.  - Performed physical exam, noting regular heart rhythm without gallop or rub.  - Recommend high-dose statin due to history of bypass surgery, but noted patient's reluctance.  - Suggested alternative fibropathic medication administered as a shot every 2 weeks.    ATRIAL FIBRILLATION:  - Noted the patient's report of experiencing occasional fibrillation or palpitations.  - Acknowledged the potential seriousness of the palpitations given the patient's cardiac history.  - Referred the patient to cardiologist Dr. Reynolds to discuss palpitations.  - Advised the patient to inform Dr. Reynolds about the palpitations.    HYPERLIPIDEMIA:  - Emphasized the importance of cholesterol management for patients with a history of coronary disease.  - Discussed the difference between triglycerides and cholesterol.  - Noted the patient's cholesterol levels are being monitored, with last check in July of previous year.  - Observed that triglycerides have decreased from 213 to 92.  - Ordered lipid panel.  - Scheduled follow up in 6 months for routine cholesterol check.  - Noted the patient is taking natural remedies  instead of prescribed cholesterol medication.  - Suggested alternative fibropathic medication for cholesterol management.    ABNORMAL BLOOD CHEMISTRY FINDINGS:  - Explained that D-dimer is a screening test for blood clots, but not diagnostic on its own.  - Noted the patient's elevated D-dimer requiring an ER visit.  - Observed that D-dimer levels were normal in May of 22 and 5 years prior.  - Explained that some individuals have persistently high D-dimer levels without clinical significance.  - Ordered comprehensive metabolic panel and HbA1c.    LOCALIZED EDEMA:  - Observed trace peripheral edema in extremities during exam.    PERICARDITIS:  - Noted the patient's history of pericarditis.    PRESENCE OF AORTOCORONARY BYPASS GRAFT:  - Acknowledged the patient's history of two coronary artery bypass grafts.  - Recognized the implications for ongoing cardiac care due to bypass history.  - Recommend follow-up with cardiologist enriqueta coles to bypass history.    FOLLOW-UP:  - Advised the patient to follow up with cardiologist Dr. Reynolds.  - Ms. Salcido to continue current healthy eating habits, including whole grains and vegetable-based snacks.        Marie was seen today for follow-up.    Diagnoses and all orders for this visit:    Coronary artery disease involving coronary bypass graft of native heart, unspecified whether angina present  -     Ambulatory referral/consult to Cardiology; Future  -     Hemoglobin A1C; Future    Mixed hyperlipidemia  -     Ambulatory referral/consult to Cardiology; Future  -     Lipid Panel; Future  -     Comprehensive Metabolic Panel; Future    Fatty liver    Blood D-dimer assay positive  -     D-Dimer, Quantitative; Future    History of anemia  -     CBC Auto Differential; Future         No follow-ups on file.  This note was generated with the assistance of ambient listening technology. Verbal consent was obtained by the patient and accompanying visitor(s) for the recording of patient  appointment to facilitate this note. I attest to having reviewed and edited the generated note for accuracy, though some syntax or spelling errors may persist. Please contact the author of this note for any clarification.            [1]   Current Outpatient Medications   Medication Sig Dispense Refill    aspirin (ECOTRIN) 81 MG EC tablet Take 81 mg by mouth once daily.      tiZANidine (ZANAFLEX) 4 MG tablet Take 4 mg by mouth nightly as needed.      colchicine (COLCRYS) 0.6 mg tablet Take 1 tablet (0.6 mg total) by mouth 2 (two) times daily. (Patient not taking: Reported on 3/20/2025) 60 tablet 2    famotidine (PEPCID) 20 MG tablet Take 1 tablet (20 mg total) by mouth 2 (two) times daily. (Patient not taking: Reported on 3/20/2025) 60 tablet 0     No current facility-administered medications for this visit.   [2]   Social History  Socioeconomic History    Marital status:    Tobacco Use    Smoking status: Former     Current packs/day: 0.00     Types: Cigarettes     Quit date:      Years since quittin.2    Smokeless tobacco: Never   Substance and Sexual Activity    Alcohol use: Yes     Alcohol/week: 1.0 standard drink of alcohol     Types: 1 Glasses of wine per week     Comment: daily    Drug use: No    Sexual activity: Yes     Partners: Male     Social Drivers of Health     Financial Resource Strain: Low Risk  (11/3/2024)    Overall Financial Resource Strain (CARDIA)     Difficulty of Paying Living Expenses: Not very hard   Food Insecurity: No Food Insecurity (11/3/2024)    Hunger Vital Sign     Worried About Running Out of Food in the Last Year: Never true     Ran Out of Food in the Last Year: Never true   Transportation Needs: No Transportation Needs (11/3/2024)    TRANSPORTATION NEEDS     Transportation : No   Physical Activity: Inactive (11/3/2024)    Exercise Vital Sign     Days of Exercise per Week: 0 days     Minutes of Exercise per Session: 0 min   Stress: No Stress Concern Present (11/3/2024)     [employer] - Occupational Stress Questionnaire     Feeling of Stress : Not at all   Recent Concern: Stress - Stress Concern Present (9/2/2024)    [employer] - Occupational Stress Questionnaire     Feeling of Stress : To some extent   Housing Stability: Unknown (11/3/2024)    Housing Stability Vital Sign     Unable to Pay for Housing in the Last Year: No     Homeless in the Last Year: No   Recent Concern: Housing Stability - High Risk (9/2/2024)    Housing Stability Vital Sign     Unable to Pay for Housing in the Last Year: Yes

## 2025-03-25 ENCOUNTER — HOSPITAL ENCOUNTER (EMERGENCY)
Facility: HOSPITAL | Age: 65
Discharge: HOME | End: 2025-03-26
Attending: EMERGENCY MEDICINE
Payer: OTHER GOVERNMENT

## 2025-03-25 DIAGNOSIS — M79.10 MYALGIA: ICD-10-CM

## 2025-03-25 DIAGNOSIS — R51.9 ACUTE NONINTRACTABLE HEADACHE, UNSPECIFIED HEADACHE TYPE: Primary | ICD-10-CM

## 2025-03-25 DIAGNOSIS — J01.90 ACUTE NON-RECURRENT SINUSITIS, UNSPECIFIED LOCATION: ICD-10-CM

## 2025-03-25 DIAGNOSIS — M54.50 CHRONIC LOW BACK PAIN WITHOUT SCIATICA, UNSPECIFIED BACK PAIN LATERALITY: ICD-10-CM

## 2025-03-25 DIAGNOSIS — R07.9 CHEST PAIN: ICD-10-CM

## 2025-03-25 DIAGNOSIS — G89.29 CHRONIC LOW BACK PAIN WITHOUT SCIATICA, UNSPECIFIED BACK PAIN LATERALITY: ICD-10-CM

## 2025-03-25 LAB
ABSOLUTE EOSINOPHIL (SMH): 0.01 K/UL
ABSOLUTE MONOCYTE (SMH): 0.45 K/UL (ref 0.3–1)
ABSOLUTE NEUTROPHIL COUNT (SMH): 6.9 K/UL (ref 1.8–7.7)
ALBUMIN SERPL-MCNC: 4.2 G/DL (ref 3.5–5.2)
ALP SERPL-CCNC: 46 UNIT/L (ref 55–135)
ALT SERPL-CCNC: 16 UNIT/L (ref 10–44)
ANION GAP (SMH): 8 MMOL/L (ref 8–16)
AST SERPL-CCNC: 15 UNIT/L (ref 10–40)
BASOPHILS # BLD AUTO: 0.04 K/UL
BASOPHILS NFR BLD AUTO: 0.5 %
BILIRUB SERPL-MCNC: 0.6 MG/DL (ref 0.1–1)
BILIRUB UR QL STRIP.AUTO: NEGATIVE
BUN SERPL-MCNC: 9 MG/DL (ref 8–23)
C-REACTIVE PROTEIN (SMH): 0.5 MG/DL
CALCIUM SERPL-MCNC: 9.2 MG/DL (ref 8.7–10.5)
CHLORIDE SERPL-SCNC: 103 MMOL/L (ref 95–110)
CLARITY UR: CLEAR
CO2 SERPL-SCNC: 25 MMOL/L (ref 23–29)
COLOR UR AUTO: YELLOW
CREAT SERPL-MCNC: 0.7 MG/DL (ref 0.5–1.4)
ERYTHROCYTE [DISTWIDTH] IN BLOOD BY AUTOMATED COUNT: 17.9 % (ref 11.5–14.5)
ERYTHROCYTE [SEDIMENTATION RATE] IN BLOOD: 20 MM/HR (ref 0–20)
GFR SERPLBLD CREATININE-BSD FMLA CKD-EPI: >60 ML/MIN/1.73/M2
GLUCOSE SERPL-MCNC: 115 MG/DL (ref 70–110)
GLUCOSE UR QL STRIP: NEGATIVE
HCT VFR BLD AUTO: 44.8 % (ref 37–48.5)
HGB BLD-MCNC: 13.5 GM/DL (ref 12–16)
HGB UR QL STRIP: NEGATIVE
IMM GRANULOCYTES # BLD AUTO: 0.02 K/UL (ref 0–0.04)
IMM GRANULOCYTES NFR BLD AUTO: 0.2 % (ref 0–0.5)
INFLUENZA A MOLECULAR (OHS): NEGATIVE
INFLUENZA B MOLECULAR (OHS): NEGATIVE
KETONES UR QL STRIP: ABNORMAL
LEUKOCYTE ESTERASE UR QL STRIP: NEGATIVE
LYMPHOCYTES # BLD AUTO: 1.21 K/UL (ref 1–4.8)
MCH RBC QN AUTO: 19.8 PG (ref 27–31)
MCHC RBC AUTO-ENTMCNC: 30.1 G/DL (ref 32–36)
MCV RBC AUTO: 66 FL (ref 82–98)
NITRITE UR QL STRIP: NEGATIVE
NUCLEATED RBC (/100WBC) (SMH): 0 /100 WBC
PH UR STRIP: 6 [PH]
PLATELET # BLD AUTO: 284 K/UL (ref 150–450)
PMV BLD AUTO: 9.8 FL (ref 9.2–12.9)
POTASSIUM SERPL-SCNC: 4.1 MMOL/L (ref 3.5–5.1)
PROT SERPL-MCNC: 7.9 GM/DL (ref 6–8.4)
PROT UR QL STRIP: ABNORMAL
RBC # BLD AUTO: 6.83 M/UL (ref 4–5.4)
RELATIVE EOSINOPHIL (SMH): 0.1 % (ref 0–8)
RELATIVE LYMPHOCYTE (SMH): 14.1 % (ref 18–48)
RELATIVE MONOCYTE (SMH): 5.2 % (ref 4–15)
RELATIVE NEUTROPHIL (SMH): 79.9 % (ref 38–73)
SARS-COV-2 RDRP RESP QL NAA+PROBE: NEGATIVE
SODIUM SERPL-SCNC: 136 MMOL/L (ref 136–145)
SP GR UR STRIP: >=1.03
TROPONIN HIGH SENSITIVE (SMH): 7 PG/ML
UROBILINOGEN UR STRIP-ACNC: NEGATIVE EU/DL
WBC # BLD AUTO: 8.58 K/UL (ref 3.9–12.7)

## 2025-03-25 PROCEDURE — 25000003 PHARM REV CODE 250: Performed by: PHYSICIAN ASSISTANT

## 2025-03-25 PROCEDURE — 80053 COMPREHEN METABOLIC PANEL: CPT | Performed by: PHYSICIAN ASSISTANT

## 2025-03-25 PROCEDURE — 99285 EMERGENCY DEPT VISIT HI MDM: CPT | Mod: 25

## 2025-03-25 PROCEDURE — 25000003 PHARM REV CODE 250: Performed by: EMERGENCY MEDICINE

## 2025-03-25 PROCEDURE — U0002 COVID-19 LAB TEST NON-CDC: HCPCS | Performed by: PHYSICIAN ASSISTANT

## 2025-03-25 PROCEDURE — 85025 COMPLETE CBC W/AUTO DIFF WBC: CPT | Performed by: PHYSICIAN ASSISTANT

## 2025-03-25 PROCEDURE — 86140 C-REACTIVE PROTEIN: CPT | Performed by: EMERGENCY MEDICINE

## 2025-03-25 PROCEDURE — 87502 INFLUENZA DNA AMP PROBE: CPT | Performed by: PHYSICIAN ASSISTANT

## 2025-03-25 PROCEDURE — 84484 ASSAY OF TROPONIN QUANT: CPT | Performed by: EMERGENCY MEDICINE

## 2025-03-25 PROCEDURE — 36415 COLL VENOUS BLD VENIPUNCTURE: CPT | Performed by: PHYSICIAN ASSISTANT

## 2025-03-25 PROCEDURE — 81003 URINALYSIS AUTO W/O SCOPE: CPT | Performed by: PHYSICIAN ASSISTANT

## 2025-03-25 PROCEDURE — 85651 RBC SED RATE NONAUTOMATED: CPT | Performed by: EMERGENCY MEDICINE

## 2025-03-25 RX ORDER — ONDANSETRON 4 MG/1
4 TABLET, ORALLY DISINTEGRATING ORAL
Status: COMPLETED | OUTPATIENT
Start: 2025-03-25 | End: 2025-03-25

## 2025-03-25 RX ORDER — BUTALBITAL, ACETAMINOPHEN AND CAFFEINE 50; 325; 40 MG/1; MG/1; MG/1
1 TABLET ORAL ONCE
Status: COMPLETED | OUTPATIENT
Start: 2025-03-25 | End: 2025-03-25

## 2025-03-25 RX ADMIN — IBUPROFEN 600 MG: 200 TABLET, FILM COATED ORAL at 09:03

## 2025-03-25 RX ADMIN — BUTALBITAL, ACETAMINOPHEN, AND CAFFEINE 1 TABLET: 325; 50; 40 TABLET ORAL at 11:03

## 2025-03-25 RX ADMIN — ONDANSETRON 4 MG: 4 TABLET, ORALLY DISINTEGRATING ORAL at 09:03

## 2025-03-26 VITALS
TEMPERATURE: 99 F | SYSTOLIC BLOOD PRESSURE: 167 MMHG | RESPIRATION RATE: 18 BRPM | HEART RATE: 64 BPM | OXYGEN SATURATION: 99 % | DIASTOLIC BLOOD PRESSURE: 86 MMHG

## 2025-03-26 LAB
BNP SERPL-MCNC: 36 PG/ML
OHS QRS DURATION: 82 MS
OHS QTC CALCULATION: 455 MS

## 2025-03-26 PROCEDURE — 25000003 PHARM REV CODE 250: Performed by: EMERGENCY MEDICINE

## 2025-03-26 PROCEDURE — 93010 ELECTROCARDIOGRAM REPORT: CPT | Mod: ,,, | Performed by: INTERNAL MEDICINE

## 2025-03-26 PROCEDURE — 96374 THER/PROPH/DIAG INJ IV PUSH: CPT

## 2025-03-26 PROCEDURE — 63600175 PHARM REV CODE 636 W HCPCS: Performed by: EMERGENCY MEDICINE

## 2025-03-26 PROCEDURE — 93005 ELECTROCARDIOGRAM TRACING: CPT | Performed by: INTERNAL MEDICINE

## 2025-03-26 PROCEDURE — 83880 ASSAY OF NATRIURETIC PEPTIDE: CPT | Performed by: EMERGENCY MEDICINE

## 2025-03-26 RX ORDER — ONDANSETRON 4 MG/1
4 TABLET, ORALLY DISINTEGRATING ORAL EVERY 6 HOURS PRN
Qty: 12 TABLET | Refills: 0 | Status: SHIPPED | OUTPATIENT
Start: 2025-03-26

## 2025-03-26 RX ORDER — DEXAMETHASONE SODIUM PHOSPHATE 4 MG/ML
8 INJECTION, SOLUTION INTRA-ARTICULAR; INTRALESIONAL; INTRAMUSCULAR; INTRAVENOUS; SOFT TISSUE
Status: COMPLETED | OUTPATIENT
Start: 2025-03-26 | End: 2025-03-26

## 2025-03-26 RX ORDER — BUTALBITAL, ACETAMINOPHEN AND CAFFEINE 50; 325; 40 MG/1; MG/1; MG/1
1 TABLET ORAL EVERY 6 HOURS PRN
Qty: 20 TABLET | Refills: 0 | Status: SHIPPED | OUTPATIENT
Start: 2025-03-26 | End: 2025-04-25

## 2025-03-26 RX ORDER — BUTALBITAL, ACETAMINOPHEN AND CAFFEINE 50; 325; 40 MG/1; MG/1; MG/1
1 TABLET ORAL ONCE
Status: DISCONTINUED | OUTPATIENT
Start: 2025-03-26 | End: 2025-03-26

## 2025-03-26 RX ORDER — BUTALBITAL, ACETAMINOPHEN AND CAFFEINE 50; 325; 40 MG/1; MG/1; MG/1
1 TABLET ORAL
Status: COMPLETED | OUTPATIENT
Start: 2025-03-26 | End: 2025-03-26

## 2025-03-26 RX ORDER — AMOXICILLIN AND CLAVULANATE POTASSIUM 875; 125 MG/1; MG/1
1 TABLET, FILM COATED ORAL 2 TIMES DAILY
Qty: 14 TABLET | Refills: 0 | Status: SHIPPED | OUTPATIENT
Start: 2025-03-26 | End: 2025-03-26 | Stop reason: ALTCHOICE

## 2025-03-26 RX ORDER — TETRACAINE HYDROCHLORIDE 5 MG/ML
2 SOLUTION OPHTHALMIC
Status: COMPLETED | OUTPATIENT
Start: 2025-03-26 | End: 2025-03-26

## 2025-03-26 RX ORDER — AMOXICILLIN 875 MG/1
875 TABLET, FILM COATED ORAL 2 TIMES DAILY
Qty: 14 TABLET | Refills: 0 | Status: SHIPPED | OUTPATIENT
Start: 2025-03-26

## 2025-03-26 RX ORDER — METHOCARBAMOL 500 MG/1
1000 TABLET, FILM COATED ORAL 3 TIMES DAILY PRN
Qty: 30 TABLET | Refills: 0 | Status: SHIPPED | OUTPATIENT
Start: 2025-03-26 | End: 2025-03-31

## 2025-03-26 RX ADMIN — DEXAMETHASONE SODIUM PHOSPHATE 8 MG: 4 INJECTION, SOLUTION INTRA-ARTICULAR; INTRALESIONAL; INTRAMUSCULAR; INTRAVENOUS; SOFT TISSUE at 03:03

## 2025-03-26 RX ADMIN — TETRACAINE HYDROCHLORIDE 2 DROP: 5 SOLUTION OPHTHALMIC at 02:03

## 2025-03-26 RX ADMIN — BUTALBITAL, ACETAMINOPHEN, AND CAFFEINE 1 TABLET: 325; 50; 40 TABLET ORAL at 03:03

## 2025-03-26 NOTE — FIRST PROVIDER EVALUATION
Emergency Department TeleTriage Encounter Note      CHIEF COMPLAINT    Chief Complaint   Patient presents with    Generalized Body Aches     Patient states she started feeling bad yesterday. Chills, bilateral ear pain, headache, pain all over.        VITAL SIGNS   Initial Vitals [03/25/25 1955]   BP Pulse Resp Temp SpO2   (!) 176/134 94 18 98.6 °F (37 °C) 100 %      MAP       --            ALLERGIES    Review of patient's allergies indicates:   Allergen Reactions    Opioids - morphine analogues     Codeine Rash       PROVIDER TRIAGE NOTE  Patient presents with chills, headache, back pain, jaw pain, left ear pain. No cough. No diarrhea or vomiting. + nausea      ORDERS  Labs Reviewed - No data to display    ED Orders (720h ago, onward)      None              Virtual Visit Note: The provider triage portion of this emergency department evaluation and documentation was performed via [employer], a HIPAA-compliant telemedicine application, in concert with a tele-presenter in the room. A face to face patient evaluation with one of my colleagues will occur once the patient is placed in an emergency department room.      DISCLAIMER: This note was prepared with [employer] voice recognition transcription software. Garbled syntax, mangled pronouns, and other bizarre constructions may be attributed to that software system.

## 2025-03-26 NOTE — ED PROVIDER NOTES
Encounter Date: 3/25/2025       History     Chief Complaint   Patient presents with    Generalized Body Aches     Patient states she started feeling bad yesterday. Chills, bilateral ear pain, headache, pain all over.      HPI  Patient with PMH pericarditis, CABG for anomalous artery, uterine fibroids, hypertension, hyperlipidemia, chronic back pain presents to ED with numerous complaints.  Her first complaint is a bifrontal headache that is worse on the right side associated with mild photophobia, intermittent blurry vision (later states this is chronic and attributes to cataracts) associated with chills and bilateral ear pain.  She denies any significant nasal congestion, sore throat or cough.  Does report some seasonal allergies.  She does intermittently have some dental pain.  She states she is aching all over including in her chest and lower back.  She denies any difficulty breathing.  She has had nausea but no vomiting.  She denies bowel changes or urinary complaints.  She denies any known sick contacts.  She states she has been taking OTC medications without improvement.  She states she has never had a headache like this.  She intermittently grabs at her scalp and states it is a shooting pain on both sides of her head.    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 Parrino [facility]    cyst removed from right breast      LASER LAPAROSCOPY      TUBAL LIGATION        Family History   Problem Relation Name Age of Onset    Arrhythmia Mother      Breast cancer Sister 3 45    Asthma Sister 3     Cancer Maternal Grandmother cervical     Diabetes Mellitus Neg Hx       Social History[1]  Review of Systems   Constitutional:  Positive for chills. Negative for fever.   HENT:  Positive for ear pain and sinus pain. Negative for sore throat.    Eyes:  Positive for photophobia and visual disturbance.   Respiratory:  Negative for cough and shortness of breath.    Cardiovascular:  Positive for chest pain.   Gastrointestinal:  Positive for nausea.   Genitourinary:  Negative for dysuria.   Musculoskeletal:  Positive for back pain.   Skin:  Negative for rash.   Neurological:  Positive for headaches. Negative for weakness.   Hematological:  Does not bruise/bleed easily.       Physical Exam     Initial Vitals [03/25/25 1955]   BP Pulse Resp Temp SpO2   (!) 176/134 94 18 98.6 °F (37 °C) 100 %      MAP       --         Physical Exam    Nursing note and vitals reviewed.  Constitutional: She appears well-developed and well-nourished. No distress.   HENT:   Head: Normocephalic and atraumatic.   Nose: Nose normal. Mouth/Throat: Oropharynx is clear and moist. No oropharyngeal exudate.   Bilateral TMs clear, no temporal tenderness to palpation.   Eyes: EOM are normal. Pupils are equal, round, and reactive to light.   IOP WNL bilaterally.   Neck: Neck supple.   Normal range of motion.  Cardiovascular:  Normal rate and regular rhythm.           Pulmonary/Chest: Breath sounds normal. No respiratory distress.   Abdominal: Abdomen is soft. There is no abdominal tenderness. There is no rebound and no guarding.   Musculoskeletal:         General: No tenderness or edema. Normal range of motion.      Cervical back: Normal range of motion and neck supple.     Neurological: She is alert and oriented to person, place, and time. She has normal strength. No cranial nerve deficit. GCS score is 15. GCS eye subscore is  4. GCS verbal subscore is 5. GCS motor subscore is 6.   Skin: Skin is warm and dry. Capillary refill takes less than 2 seconds. No rash noted.   Psychiatric: Thought content normal.   Anxious appearing.         ED Course   Procedures  Labs Reviewed   COMPREHENSIVE METABOLIC PANEL - Abnormal       Result Value    Sodium 136      Potassium 4.1      Chloride 103      CO2 25      Glucose 115 (*)     BUN 9      Creatinine 0.7      Calcium 9.2      Protein Total 7.9      Albumin 4.2      Bilirubin Total 0.6      ALP 46 (*)     AST 15      ALT 16      Anion Gap 8      eGFR >60     URINALYSIS, REFLEX TO URINE CULTURE - Abnormal    Color, UA Yellow      Appearance, UA Clear      Spec Grav UA >=1.030 (*)     pH, UA 6.0      Protein, UA Trace (*)     Glucose, UA Negative      Ketones, UA Trace (*)     Blood, UA Negative      Bilirubin, UA Negative      Urobilinogen, UA Negative      Nitrites, UA Negative      Leukocyte Esterase, UA Negative     CBC WITH DIFFERENTIAL - Abnormal    WBC 8.58      RBC 6.83 (*)     Hgb 13.5      Hct 44.8      MCV 66 (*)     MCH 19.8 (*)     MCHC 30.1 (*)     RDW 17.9 (*)     Platelet Count 284      MPV 9.8      Nucleated RBC 0      Neut % 79.9 (*)     Lymph % 14.1 (*)     Mono % 5.2      Eos % 0.1      Basophil % 0.5      Imm Grans % 0.2      Neut # 6.9      Lymph # 1.21      Mono # 0.45      Eos # 0.01      Baso # 0.04      Imm Grans # 0.02     INFLUENZA A & B BY MOLECULAR - Normal    INFLUENZA A MOLECULAR Negative      INFLUENZA B MOLECULAR  Negative     SARS-COV-2 RNA AMPLIFICATION, QUAL - Normal    SARS COV-2 Molecular Negative     SEDIMENTATION RATE - Normal    Sed Rate 20     C-REACTIVE PROTEIN - Normal    CRP 0.50     TROPONIN I HIGH SENSITIVITY - Normal    Troponin High Sensitive 7.0     B-TYPE NATRIURETIC PEPTIDE - Normal    BNP 36     CBC W/ AUTO DIFFERENTIAL    Narrative:     The following orders were created for panel order CBC auto differential.  Procedure                                Abnormality         Status                     ---------                               -----------         ------                     CBC with Differential[[id number]]       Abnormal            Final result                 Please view results for these tests on the individual orders.     EKG Readings: (Independently Interpreted)   NSR, HR 74, normal intervals, nonspecific ST abnormalities, not significantly changed from prior.     ECG Results              EKG 12-lead (Final result)        Collection Time Result Time QRS Duration OHS QTC Calculation    03/26/25 02:07:02 03/26/25 20:38:14 82 455                     Final result by Interface, Lab In [facility] (03/26/25 20:38:22)                   Narrative:    Test Reason : R07.9,    Vent. Rate :  74 BPM     Atrial Rate :  74 BPM     P-R Int : 170 ms          QRS Dur :  82 ms      QT Int : 410 ms       P-R-T Axes :  63  74  79 degrees    QTcB Int : 455 ms    Normal sinus rhythm  Nonspecific ST abnormality  Abnormal ECG  Confirmed by Jamal Underwood ([id number]) on 3/26/2025 8:38:12 PM    Referred By: AAAREFERRAL SELF           Confirmed By: Jamal Underwood                                  Imaging Results              X-Ray Chest PA And Lateral (Final result)  Result time 03/26/25 02:40:32      Final result by Tony Cr MD (03/26/25 02:40:32)                   Impression:      No acute cardiopulmonary process.      Electronically signed by: Tony Cr MD  Date:    03/26/2025  Time:    02:40               Narrative:    EXAMINATION:  XR CHEST PA AND LATERAL    CLINICAL HISTORY:  Chest pain, unspecified    TECHNIQUE:  PA and lateral views of the chest were performed.    COMPARISON:  11/02/2024.    FINDINGS:  Sternotomy wires, similar to prior.    There is no consolidation, effusion, or pneumothorax.    Cardiomediastinal silhouette is unremarkable.    Regional osseous structures are similar to prior.                                       CT Head Without  Contrast (Final result)  Result time 03/25/25 22:41:31      Final result by Roc Escalante MD (03/25/25 22:41:31)                   Impression:      No acute findings      Electronically signed by: Roc Escalante  Date:    03/25/2025  Time:    22:41               Narrative:    EXAMINATION:  CT HEAD WITHOUT CONTRAST    CLINICAL HISTORY:  Headache, new or worsening (Age >= 50y);    TECHNIQUE:  Low dose axial images were obtained through the head.  Coronal and sagittal reformations were also performed. Contrast was not administered.    COMPARISON:  CT 03/16/2024    FINDINGS:  Intracranial compartment:    Ventricles and sulci are normal in size for age without evidence of hydrocephalus. No extra-axial blood or fluid collections.    Mild chronic microvascular ischemia no parenchymal mass, hemorrhage, edema or major vascular distribution infarct.    Skull/extracranial contents (limited evaluation): No fracture. Mastoid air cells and paranasal sinuses are essentially clear.                                       Medications   ondansetron disintegrating tablet 4 mg (4 mg Oral Given 3/25/25 2127)   ibuprofen tablet 600 mg (600 mg Oral Given 3/25/25 2127)   butalbital-acetaminophen-caffeine -40 mg per tablet 1 tablet (1 tablet Oral Given 3/25/25 2338)   TETRAcaine HCl (PF) 0.5 % Drop 2 drop (2 drops Right Eye Given by Provider 3/26/25 0230)   dexAMETHasone injection 8 mg (8 mg Intravenous Given 3/26/25 0311)   butalbital-acetaminophen-caffeine -40 mg per tablet 1 tablet (1 tablet Oral Given 3/26/25 0315)     Medical Decision Making  Amount and/or Complexity of Data Reviewed  External Data Reviewed: notes.  Labs: ordered. Decision-making details documented in ED Course.  Radiology: ordered and independent interpretation performed. Decision-making details documented in ED Course.  ECG/medicine tests: ordered and independent interpretation performed. Decision-making details documented in ED  Course.    Risk  Prescription drug management.                     Patient presents to ED as above.  Mildly hypertensive with otherwise stable vitals.  Differential includes not limited to tension headache, migraine headache, sinus headache, viral illness, lower suspicion for TIA/CVA, tumor, giant cell arteritis.  All labs reviewed by me and significant for normal WBC count, normal hemoglobin, stable electrolytes and renal function, negative troponin, normal CRP, negative COVID, negative flu, UA negative for UTI.  In light of her age and abnormal headache, CT head obtained for further evaluation and per radiology read there is no acute intracranial process.   Chest x-ray independently reviewed by me and per radiology read there is no acute cardiopulmonary process.  Patient was treated here with ibuprofen, Zofran, Fioricet with some improvement in her headache.  We will give dexamethasone 8 mg x 1 for rebound headache prevention.  I have lower suspicion for acute bacterial sinusitis given clear sinuses on CT; however if not clinically improving within the next few days would recommend starting course given reported sinus and ear pain.  Will also prescribe a short course of Fioricet for as needed use and refill muscle relaxer for her chronic back pain.  Recommend close PCP follow up for recheck.  ED return precautions discussed and provided.              Clinical Impression:  Final diagnoses:  [R07.9] Chest pain  [R51.9] Acute nonintractable headache, unspecified headache type (Primary)  [M54.50, G89.29] Chronic low back pain without sciatica, unspecified back pain laterality  [M79.10] Myalgia  [J01.90] Acute non-recurrent sinusitis, unspecified location          ED Disposition Condition    Discharge Stable          ED Prescriptions       Medication Sig Dispense Start Date End Date Auth. Provider    amoxicillin-clavulanate 875-125mg (AUGMENTIN) 875-125 mg per tablet  (Status: Discontinued) Take 1 tablet by mouth 2  (two) times daily. for 7 days 14 tablet 3/26/2025 3/26/2025 Kitty Lorenzo MD    butalbital-acetaminophen-caffeine -40 mg (FIORICET, ESGIC) -40 mg per tablet Take 1 tablet by mouth every 6 (six) hours as needed for Headaches. Do not exceed 6 tablets in 24 hours. 20 tablet 3/26/2025 2025 Kitty Lorenzo MD    ondansetron (ZOFRAN-ODT) 4 MG TbDL Take 1 tablet (4 mg total) by mouth every 6 (six) hours as needed (nausea). 12 tablet 3/26/2025 -- iKtty Lorenzo MD    methocarbamoL (ROBAXIN) 500 MG Tab () Take 2 tablets (1,000 mg total) by mouth 3 (three) times daily as needed (muscle spasms). 30 tablet 3/26/2025 3/31/2025 Kitty Lorenzo MD    amoxicillin (AMOXIL) 875 MG tablet Take 1 tablet (875 mg total) by mouth 2 (two) times daily. 14 tablet 3/26/2025 -- Kitty Lorenzo MD          Follow-up Information       Follow up With Specialties Details Why Contact Info Additional Information    Justen Reyez MD Family Medicine Schedule an appointment as soon as possible for a visit   [address]  [address]  [place] [address]  [phone]       [facility] - Emergency Dept Emergency Medicine  As needed, If symptoms worsen [address] [id number] [phone] 1st floor                 [1]   Social History  Tobacco Use    Smoking status: Former     Current packs/day: 0.00     Types: Cigarettes     Quit date:      Years since quittin.2    Smokeless tobacco: Never   Substance Use Topics    Alcohol use: Yes     Alcohol/week: 1.0 standard drink of alcohol     Types: 1 Glasses of wine per week     Comment: daily    Drug use: No        Kitty Lorenzo MD  25 [phone]

## 2025-03-26 NOTE — DISCHARGE INSTRUCTIONS
Please follow-up with your primary care doctor within the next 1 week for recheck.  You may return to the emergency room for any acutely worsening symptoms or concerns.  Recommend over-the-counter Zyrtec daily as needed, as directed for seasonal allergies.

## 2025-04-07 ENCOUNTER — TELEPHONE (OUTPATIENT)
Dept: FAMILY MEDICINE | Facility: CLINIC | Age: 65
End: 2025-04-07
Payer: OTHER GOVERNMENT

## 2025-04-07 NOTE — TELEPHONE ENCOUNTER
----- Message from Kitty sent at 4/7/2025  7:42 AM CDT -----  - 4/4- 3:30 pm- pt needs an er f/u visit [phone]

## 2025-04-16 ENCOUNTER — OFFICE VISIT (OUTPATIENT)
Dept: FAMILY MEDICINE | Facility: CLINIC | Age: 65
End: 2025-04-16
Payer: OTHER GOVERNMENT

## 2025-04-16 VITALS
BODY MASS INDEX: 31.9 KG/M2 | WEIGHT: 203.25 LBS | TEMPERATURE: 98 F | OXYGEN SATURATION: 97 % | SYSTOLIC BLOOD PRESSURE: 118 MMHG | HEART RATE: 67 BPM | DIASTOLIC BLOOD PRESSURE: 92 MMHG | HEIGHT: 67 IN

## 2025-04-16 DIAGNOSIS — H53.8 BLURRY VISION, BILATERAL: Primary | ICD-10-CM

## 2025-04-16 DIAGNOSIS — R00.2 PALPITATIONS: ICD-10-CM

## 2025-04-16 DIAGNOSIS — H43.391 VITREOUS FLOATERS OF RIGHT EYE: ICD-10-CM

## 2025-04-16 DIAGNOSIS — I25.810 CORONARY ARTERY DISEASE INVOLVING CORONARY BYPASS GRAFT OF NATIVE HEART, UNSPECIFIED WHETHER ANGINA PRESENT: ICD-10-CM

## 2025-04-16 PROCEDURE — 99999 PR PBB SHADOW E&M-EST. PATIENT-LVL V: CPT | Mod: PBBFAC,,, | Performed by: NURSE PRACTITIONER

## 2025-04-16 PROCEDURE — 99215 OFFICE O/P EST HI 40 MIN: CPT | Mod: PBBFAC,PN | Performed by: NURSE PRACTITIONER

## 2025-04-16 PROCEDURE — 99213 OFFICE O/P EST LOW 20 MIN: CPT | Mod: S$PBB,,, | Performed by: NURSE PRACTITIONER

## 2025-04-16 NOTE — PROGRESS NOTES
SUBJECTIVE:      Patient ID: Marie Salcido is a 64 y.o. female.    Chief Complaint: Follow-up (ER f/u)    History of Present Illness    CHIEF COMPLAINT:  Ms. Salcido presents for follow-up after a recent ER visit and to discuss ongoing eye symptoms.    HPI:  Ms. Salcido reports feeling predominantly sad and mentions a recent emergency room visit. She has had palpitations for an extended period but did not follow up with a cardiologist, as she felt it was unnecessary since nothing was found during her ER visit. Ms. Salcido denies that her ER visit was related to sinusitis, stating she can differentiate it from previous sinus infections.    Ms. Salcido's primary concern is related to her eyes. She reports seeing a black dot in her right eye, which she perceives when moving her eyes. She describes it as multiple and can pinpoint its exact location. The left eye is also affected but to a lesser degree. Ms. Salcido reports floaters in both eyes, with the right eye more severely affected. She sometimes perceives something directly in front of her when looking up, but realizes it is not actually present. Ms. Salcido expresses a need to see an eye doctor due to these symptoms.    Ms. Salcido mentions a belief that she may have parasites, citing information from social media. She reports sometimes having an ammonia-like odor, which she believes may be indicative of parasites.    MEDICATIONS:  Ms. Salcido reports not taking any medications currently, only using herbal supplements. She has discontinued all previous medications listed in the computer system.    MEDICAL HISTORY:  Ms. Salcido has a history of palpitations.    TEST RESULTS:  Ms. Salcido underwent an EKG as part of an ER visit. The date and results of this test were not specified.    IMAGING:  A CT was performed during the patient's ER visit. The results were reported as normal, although the specific date was not provided.    SOCIAL HISTORY:   History:  coverage  mentioned, suggesting  affiliation        Review of Systems   Constitutional:  Negative for activity change, appetite change, chills, diaphoresis, fatigue, fever and unexpected weight change.   HENT:  Negative for congestion, ear pain, sinus pressure, sore throat, trouble swallowing and voice change.    Eyes:  Positive for visual disturbance. Negative for pain and discharge.   Respiratory:  Negative for cough, chest tightness, shortness of breath and wheezing.    Cardiovascular:  Positive for palpitations. Negative for chest pain.   Gastrointestinal:  Negative for abdominal pain, constipation, diarrhea, nausea and vomiting.   Genitourinary:  Negative for difficulty urinating, flank pain, frequency and urgency.   Musculoskeletal:  Negative for back pain and joint swelling.   Skin:  Negative for color change and rash.   Neurological:  Negative for dizziness, seizures, syncope, weakness, numbness and headaches.   Hematological:  Negative for adenopathy.   Psychiatric/Behavioral:  Positive for dysphoric mood. Negative for sleep disturbance. The patient is not nervous/anxious.        Family History   Problem Relation Name Age of Onset    Arrhythmia Mother      Breast cancer Sister 3 45    Asthma Sister 3     Cancer Maternal Grandmother cervical     Diabetes Mellitus Neg Hx        Social History     Socioeconomic History    Marital status:    Tobacco Use    Smoking status: Former     Current packs/day: 0.00     Types: Cigarettes     Quit date:      Years since quittin.2    Smokeless tobacco: Never   Substance and Sexual Activity    Alcohol use: Yes     Alcohol/week: 1.0 standard drink of alcohol     Types: 1 Glasses of wine per week     Comment: daily    Drug use: No    Sexual activity: Yes     Partners: Male     Social Drivers of Health     Financial Resource Strain: Low Risk  (11/3/2024)    Overall Financial Resource Strain (CARDIA)     Difficulty of Paying Living Expenses: Not very hard   Food  Insecurity: No Food Insecurity (11/3/2024)    Hunger Vital Sign     Worried About Running Out of Food in the Last Year: Never true     Ran Out of Food in the Last Year: Never true   Transportation Needs: No Transportation Needs (11/3/2024)    TRANSPORTATION NEEDS     Transportation : No   Physical Activity: Inactive (11/3/2024)    Exercise Vital Sign     Days of Exercise per Week: 0 days     Minutes of Exercise per Session: 0 min   Stress: No Stress Concern Present (11/3/2024)    [place] [employer] - Occupational Stress Questionnaire     Feeling of Stress : Not at all   Recent Concern: Stress - Stress Concern Present (9/2/2024)    [place] [employer] - Occupational Stress Questionnaire     Feeling of Stress : To some extent   Housing Stability: Unknown (11/3/2024)    Housing Stability Vital Sign     Unable to Pay for Housing in the Last Year: No     Homeless in the Last Year: No   Recent Concern: Housing Stability - High Risk (9/2/2024)    Housing Stability Vital Sign     Unable to Pay for Housing in the Last Year: Yes     No current outpatient medications on file.     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 [facility]    cyst removed from right breast      LASER LAPAROSCOPY      TUBAL LIGATION            OBJECTIVE:      Vitals:     "04/16/25 1346   BP: (!) 118/92   BP Location: Left arm   Patient Position: Sitting   Pulse: 67   Temp: 98.1 °F (36.7 °C)   TempSrc: Oral   SpO2: 97%   Weight: 92.2 kg (203 lb 4.2 oz)   Height: 5' 7" (1.702 m)     Physical Exam  Vitals and nursing note reviewed.   Constitutional:       General: She is awake. She is not in acute distress.     Appearance: She is well-developed and well-groomed. She is obese. She is not ill-appearing, toxic-appearing or diaphoretic.   HENT:      Head: Normocephalic and atraumatic.      Nose: Nose normal.   Eyes:      General: Lids are normal. Gaze aligned appropriately.      Conjunctiva/sclera: Conjunctivae normal.      Right eye: Right conjunctiva is not injected.      Left eye: Left conjunctiva is not injected.      Pupils: Pupils are equal, round, and reactive to light.   Cardiovascular:      Rate and Rhythm: Normal rate and regular rhythm.      Pulses: Normal pulses.      Heart sounds: Normal heart sounds, S1 normal and S2 normal. No murmur heard.     No friction rub. No gallop.   Pulmonary:      Effort: Pulmonary effort is normal. No respiratory distress.      Breath sounds: Normal breath sounds. No stridor. No decreased breath sounds, wheezing, rhonchi or rales.   Chest:      Chest wall: No tenderness.   Musculoskeletal:      Cervical back: Neck supple.      Right lower leg: No edema.      Left lower leg: No edema.   Lymphadenopathy:      Cervical: No cervical adenopathy.   Skin:     General: Skin is warm and dry.      Capillary Refill: Capillary refill takes less than 2 seconds.      Findings: No erythema or rash.   Neurological:      Mental Status: She is alert and oriented to person, place, and time. Mental status is at baseline.   Psychiatric:         Attention and Perception: Attention normal.         Mood and Affect: Mood normal.         Speech: Speech normal.         Behavior: Behavior normal. Behavior is cooperative.         Thought Content: Thought content normal.         " Judgment: Judgment normal.       Admission on 03/25/2025, Discharged on 03/26/2025   Component Date Value Ref Range Status    Sodium 03/25/2025 136  136 - 145 mmol/L Final    Potassium 03/25/2025 4.1  3.5 - 5.1 mmol/L Final    Chloride 03/25/2025 103  95 - 110 mmol/L Final    CO2 03/25/2025 25  23 - 29 mmol/L Final    Glucose 03/25/2025 115 (H)  70 - 110 mg/dL Final    BUN 03/25/2025 9  8 - 23 mg/dL Final    Creatinine 03/25/2025 0.7  0.5 - 1.4 mg/dL Final    Calcium 03/25/2025 9.2  8.7 - 10.5 mg/dL Final    Protein Total 03/25/2025 7.9  6.0 - 8.4 gm/dL Final    Albumin 03/25/2025 4.2  3.5 - 5.2 g/dL Final    Bilirubin Total 03/25/2025 0.6  0.1 - 1.0 mg/dL Final    For infants and newborns, interpretation of results should be based   on gestational age, weight and in agreement with clinical   observations.    Premature Infant recommended reference ranges:   0-24 hours:  <8.0 mg/dL   24-48 hours: <12.0 mg/dL   3-5 days:    <15.0 mg/dL   6-29 days:   <15.0 mg/dL    ALP 03/25/2025 46 (L)  55 - 135 unit/L Final    AST 03/25/2025 15  10 - 40 unit/L Final    ALT 03/25/2025 16  10 - 44 unit/L Final    Anion Gap 03/25/2025 8  8 - 16 mmol/L Final    eGFR 03/25/2025 >60  >60 mL/min/1.73/m2 Final    Color, UA 03/25/2025 Yellow  Yellow, Straw, Katrina Final    Appearance, UA 03/25/2025 Clear  Clear Final    Spec Grav UA 03/25/2025 >=1.030 (A)  1.005 - 1.030 Final    pH, UA 03/25/2025 6.0  5.0 - 8.0 Final    Protein, UA 03/25/2025 Trace (A)  Negative Final    Recommend a 24 hr urine protein/creatinine ratio if globulin induced proteinuria is clinically suspected.    Glucose, UA 03/25/2025 Negative  Negative Final    Ketones, UA 03/25/2025 Trace (A)  Negative Final    Blood, UA 03/25/2025 Negative  Negative Final    Bilirubin, UA 03/25/2025 Negative  Negative Final    Urobilinogen, UA 03/25/2025 Negative  <2.0 EU/dL Final    Nitrites, UA 03/25/2025 Negative  Negative Final    Leukocyte Esterase, UA 03/25/2025 Negative  Negative  Final    SARS COV-2 Molecular 03/25/2025 Negative  Negative Final    Comment: This test utilizes isothermal nucleic acid amplification technology to detect the SARS-CoV-2 RdRp nucleic acid segment. The analytical sensitivity (limit of detection) is 500 copies/swab.     A POSITIVE result is indicative of the presence of SARS-CoV-2 RNA; clinical correlation with patient history and other diagnostic information is necessary to determine patient infection status.     A NEGATIVE result means that SARS-CoV-2 nucleic acids are not present above the limit of detection. A NEGATIVE result should be treated as presumptive. It does not rule out the possibility of COVID-19 and should not be the sole basis for treatment decisions. If COVID-19 is strongly suspected based on clinical and exposure history, re-testing using an alternate molecular assay should be considered.     This test is FDA approved.     Performance characteristics of this test has been independently verified by [facility] in conjunction with [facility] Department of Pathology and                            Laboratory Medicine.       INFLUENZA A MOLECULAR 03/25/2025 Negative  Negative Final    INFLUENZA B MOLECULAR  03/25/2025 Negative  Negative Final    WBC 03/25/2025 8.58  3.90 - 12.70 K/uL Final    RBC 03/25/2025 6.83 (H)  4.00 - 5.40 M/uL Final    Hgb 03/25/2025 13.5  12.0 - 16.0 gm/dL Final    Hct 03/25/2025 44.8  37.0 - 48.5 % Final    MCV 03/25/2025 66 (L)  82 - 98 fL Final    MCH 03/25/2025 19.8 (L)  27.0 - 31.0 pg Final    MCHC 03/25/2025 30.1 (L)  32.0 - 36.0 g/dL Final    RDW 03/25/2025 17.9 (H)  11.5 - 14.5 % Final    Platelet Count 03/25/2025 284  150 - 450 K/uL Final    MPV 03/25/2025 9.8  9.2 - 12.9 fL Final    Nucleated RBC 03/25/2025 0  <=0 /100 WBC Final    Neut % 03/25/2025 79.9 (H)  38 - 73 % Final    Lymph % 03/25/2025 14.1 (L)  18 - 48 % Final    Mono % 03/25/2025 5.2  4 - 15 % Final    Eos % 03/25/2025 0.1  0 -  8 % Final    Basophil % 03/25/2025 0.5  <=1.9 % Final    Imm Grans % 03/25/2025 0.2  0.0 - 0.5 % Final    Neut # 03/25/2025 6.9  1.8 - 7.7 K/uL Final    Lymph # 03/25/2025 1.21  1 - 4.8 K/uL Final    Mono # 03/25/2025 0.45  0.3 - 1 K/uL Final    Eos # 03/25/2025 0.01  <=0.5 K/uL Final    Baso # 03/25/2025 0.04  <=0.2 K/uL Final    Imm Grans # 03/25/2025 0.02  0.00 - 0.04 K/uL Final    Mild elevation in immature granulocytes is non specific and can be seen in a variety of conditions including stress response, acute inflammation, trauma and pregnancy. Correlation with other laboratory and clinical findings is essential.    Sed Rate 03/25/2025 20  0 - 20 mm/hr Final    CRP 03/25/2025 0.50  <1.00 mg/dL Final    CRP-Normal Application expected values:          <1.0        mg/dL   Normal Range          1.0 - 5.0  mg/dL   Indicates mild inflammation          5.0 - 10.0 mg/dL   Indicates severe inflammation        >10.0        mg/dL   Represents serious processes and frequently                                 indicates the presence of a bacterial infection.     Troponin High Sensitive 03/25/2025 7.0  <=14.9 pg/mL Final    Troponin results differ between methods. Do not use   results between Troponin methods interchangeably.    Alkaline Phospatase levels above 400 U/L may   cause false positive results.    Access hsTnI should not be used for patients taking   Asfotase tom (Strensiq).    QRS Duration 03/26/2025 82  ms Final    [facility] QTC Calculation 03/26/2025 455  ms Final    BNP 03/26/2025 36  <=99 pg/mL Final    Values of less than 100 pg/ml are consistent with non-CHF populations.          Assessment:       1. Blurry vision, bilateral    2. Vitreous floaters of right eye    3. Palpitations    4. Coronary artery disease involving coronary bypass graft of native heart, unspecified whether angina present        Plan:       Assessment & Plan    PLAN SUMMARY:  - Refer to Dr. Baxter at [facility] for ophthalmology  consultation  - Discuss potential treatment options for depression, including psychotherapy and/or antidepressant medications  - Schedule follow-up visit to discuss depression treatment options  - Schedule appointment with ophthalmologist for comprehensive evaluation of vitreous opacities    PALPITATIONS:  - Evaluated the patient's persistent palpitations and determined patient needs to schedule follow-up with cardiology.  - Reviewed EKG results from the previous visit.  - Noted that the patient had a recent ER visit, but no significant findings were reported.  - Observed that the patient did not follow up with a cardiologist as previously recommended.  - Instructed the patient to continue monitoring palpitations and report any changes or worsening symptoms.    VITREOUS OPACITIES - RIGHT EYE:  - Evaluated the patient's complaint of seeing a black dot in the right eye, which is worse than the left eye.  - Explained the difference between optometrists and ophthalmologists, noting that ophthalmologists have medical degrees and can perform more advanced eye care.  - Referred the patient to Dr. Baxter at [facility], for ophthalmology consultation.  - Advised the patient to schedule an appointment with the ophthalmologist as soon as possible for a comprehensive evaluation of the vitreous opacities.    VITREOUS OPACITIES - LEFT EYE:  - Assessed the patient's report of seeing floaters in the left eye, noting that it is less severe than the right eye.  - Instructed the patient to inform the ophthalmologist about the presence of floaters in both eyes during the consultation.    MAJOR DEPRESSIVE DISORDER:  - Noted the patient's report of feeling sad.  - Observed that the patient is not currently taking any medications for depression.  - Recommend scheduling a follow-up visit to discuss potential treatment options, including psychotherapy and/or antidepressant medications.  - Advised the patient  to monitor mood changes and report any worsening symptoms or suicidal thoughts immediately.    OTHER CONSIDERATIONS:  - Assessed concern about parasites, explaining that parasitic infections are uncommon in the local area and uncommon in developed countries.  - Cautioned about the reliability of medical information on social media.        Blurry vision, bilateral  -     Cancel: Ambulatory referral/consult to Optometry; Future; Expected date: 04/23/2025  -     Ambulatory referral/consult to Optometry; Future; Expected date: 04/23/2025    Vitreous floaters of right eye  -     Ambulatory referral/consult to Optometry; Future; Expected date: 04/23/2025    Palpitations    Coronary artery disease involving coronary bypass graft of native heart, unspecified whether angina present    I spent a total of 27 minutes on the day of the visit.This includes face to face time and non-face to face time preparing to see the patient (eg, review of tests), obtaining and/or reviewing separately obtained history, documenting clinical information in the electronic or other health record, independently interpreting results and communicating results to the patient/family/caregiver, or care coordinator.    Follow up if symptoms worsen or fail to improve.          4/16/2025 JACOBO Moreno, JAMAR    This note was generated with the assistance of ambient listening technology. Verbal consent was obtained by the patient and accompanying visitor(s) for the recording of patient appointment to facilitate this note. I attest to having reviewed and edited the generated note for accuracy, though some syntax or spelling errors may persist. Please contact the author of this note for any clarification.

## 2025-04-17 ENCOUNTER — RESULTS FOLLOW-UP (OUTPATIENT)
Dept: FAMILY MEDICINE | Facility: CLINIC | Age: 65
End: 2025-04-17
Payer: OTHER GOVERNMENT

## 2025-04-17 ENCOUNTER — LAB VISIT (OUTPATIENT)
Dept: LAB | Facility: HOSPITAL | Age: 65
End: 2025-04-17
Attending: FAMILY MEDICINE
Payer: OTHER GOVERNMENT

## 2025-04-17 DIAGNOSIS — K76.0 FATTY LIVER: ICD-10-CM

## 2025-04-17 DIAGNOSIS — R78.89 BLOOD D-DIMER ASSAY POSITIVE: ICD-10-CM

## 2025-04-17 DIAGNOSIS — Z86.2 HISTORY OF ANEMIA: ICD-10-CM

## 2025-04-17 DIAGNOSIS — R71.8 RBC MICROCYTOSIS: ICD-10-CM

## 2025-04-17 DIAGNOSIS — I25.810 CORONARY ARTERY DISEASE INVOLVING CORONARY BYPASS GRAFT OF NATIVE HEART, UNSPECIFIED WHETHER ANGINA PRESENT: ICD-10-CM

## 2025-04-17 DIAGNOSIS — E78.2 MIXED HYPERLIPIDEMIA: ICD-10-CM

## 2025-04-17 DIAGNOSIS — M54.9 MID BACK PAIN: ICD-10-CM

## 2025-04-17 DIAGNOSIS — E55.9 VITAMIN D DEFICIENCY: Primary | ICD-10-CM

## 2025-04-17 LAB
25(OH)D3+25(OH)D2 SERPL-MCNC: 18 NG/ML (ref 30–96)
ABSOLUTE EOSINOPHIL (SMH): 0.1 K/UL
ABSOLUTE MONOCYTE (SMH): 0.31 K/UL (ref 0.3–1)
ABSOLUTE NEUTROPHIL COUNT (SMH): 2 K/UL (ref 1.8–7.7)
ALBUMIN SERPL-MCNC: 4 G/DL (ref 3.5–5.2)
ALP SERPL-CCNC: 45 UNIT/L (ref 55–135)
ALT SERPL-CCNC: 16 UNIT/L (ref 10–44)
ANION GAP (SMH): 7 MMOL/L (ref 8–16)
AST SERPL-CCNC: 16 UNIT/L (ref 10–40)
BASOPHILS # BLD AUTO: 0.03 K/UL
BASOPHILS NFR BLD AUTO: 0.7 %
BILIRUB SERPL-MCNC: 0.5 MG/DL (ref 0.1–1)
BUN SERPL-MCNC: 10 MG/DL (ref 8–23)
C-REACTIVE PROTEIN (SMH): 0.1 MG/DL
CALCIUM SERPL-MCNC: 8.6 MG/DL (ref 8.7–10.5)
CHLORIDE SERPL-SCNC: 107 MMOL/L (ref 95–110)
CHOLEST SERPL-MCNC: 163 MG/DL (ref 120–199)
CHOLEST/HDLC SERPL: 2.5 {RATIO} (ref 2–5)
CO2 SERPL-SCNC: 26 MMOL/L (ref 23–29)
CREAT SERPL-MCNC: 0.7 MG/DL (ref 0.5–1.4)
D DIMER PPP IA.FEU-MCNC: 0.19 MG/L FEU
EAG (SMH): 126 MG/DL (ref 68–131)
ERYTHROCYTE [DISTWIDTH] IN BLOOD BY AUTOMATED COUNT: 17.3 % (ref 11.5–14.5)
GFR SERPLBLD CREATININE-BSD FMLA CKD-EPI: >60 ML/MIN/1.73/M2
GLUCOSE SERPL-MCNC: 102 MG/DL (ref 70–110)
HBA1C MFR BLD: 6 % (ref 4.5–6.2)
HCT VFR BLD AUTO: 42.7 % (ref 37–48.5)
HDLC SERPL-MCNC: 66 MG/DL (ref 40–75)
HDLC SERPL: 40.5 % (ref 20–50)
HGB BLD-MCNC: 12.7 GM/DL (ref 12–16)
IMM GRANULOCYTES # BLD AUTO: 0.01 K/UL (ref 0–0.04)
IMM GRANULOCYTES NFR BLD AUTO: 0.2 % (ref 0–0.5)
IRON SATN MFR SERPL: 27 % (ref 20–50)
IRON SERPL-MCNC: 83 UG/DL (ref 30–160)
LDLC SERPL CALC-MCNC: 75 MG/DL (ref 63–159)
LYMPHOCYTES # BLD AUTO: 1.55 K/UL (ref 1–4.8)
MCH RBC QN AUTO: 20 PG (ref 27–31)
MCHC RBC AUTO-ENTMCNC: 29.7 G/DL (ref 32–36)
MCV RBC AUTO: 67 FL (ref 82–98)
NONHDLC SERPL-MCNC: 97 MG/DL
NUCLEATED RBC (/100WBC) (SMH): 0 /100 WBC
PLATELET # BLD AUTO: 251 K/UL (ref 150–450)
PMV BLD AUTO: 10.2 FL (ref 9.2–12.9)
POTASSIUM SERPL-SCNC: 4 MMOL/L (ref 3.5–5.1)
PROT SERPL-MCNC: 6.9 GM/DL (ref 6–8.4)
RBC # BLD AUTO: 6.36 M/UL (ref 4–5.4)
RELATIVE EOSINOPHIL (SMH): 2.5 % (ref 0–8)
RELATIVE LYMPHOCYTE (SMH): 38.4 % (ref 18–48)
RELATIVE MONOCYTE (SMH): 7.7 % (ref 4–15)
RELATIVE NEUTROPHIL (SMH): 50.5 % (ref 38–73)
SODIUM SERPL-SCNC: 140 MMOL/L (ref 136–145)
TIBC SERPL-MCNC: 305 UG/DL (ref 250–450)
TRANSFERRIN SERPL-MCNC: 218 MG/DL (ref 200–375)
TRIGL SERPL-MCNC: 110 MG/DL (ref 30–150)
WBC # BLD AUTO: 4.04 K/UL (ref 3.9–12.7)

## 2025-04-17 PROCEDURE — 84466 ASSAY OF TRANSFERRIN: CPT

## 2025-04-17 PROCEDURE — 85379 FIBRIN DEGRADATION QUANT: CPT

## 2025-04-17 PROCEDURE — 86140 C-REACTIVE PROTEIN: CPT

## 2025-04-17 PROCEDURE — 83036 HEMOGLOBIN GLYCOSYLATED A1C: CPT

## 2025-04-17 PROCEDURE — 36415 COLL VENOUS BLD VENIPUNCTURE: CPT

## 2025-04-17 PROCEDURE — 84520 ASSAY OF UREA NITROGEN: CPT

## 2025-04-17 PROCEDURE — 85025 COMPLETE CBC W/AUTO DIFF WBC: CPT

## 2025-04-17 PROCEDURE — 82465 ASSAY BLD/SERUM CHOLESTEROL: CPT

## 2025-04-17 PROCEDURE — 82306 VITAMIN D 25 HYDROXY: CPT

## 2025-04-17 RX ORDER — ERGOCALCIFEROL 1.25 MG/1
50000 CAPSULE ORAL
Qty: 4 CAPSULE | Refills: 11 | Status: SHIPPED | OUTPATIENT
Start: 2025-04-17 | End: 2026-04-17

## 2025-04-17 NOTE — PROGRESS NOTES
Vitamin-D level is low.  We will initiate vitamin-D 58250 units per week, and recheck vitamin-D in 3 months.  I will send in orders.

## 2025-04-24 ENCOUNTER — TELEPHONE (OUTPATIENT)
Dept: FAMILY MEDICINE | Facility: CLINIC | Age: 65
End: 2025-04-24
Payer: OTHER GOVERNMENT

## 2025-05-06 ENCOUNTER — TELEPHONE (OUTPATIENT)
Dept: FAMILY MEDICINE | Facility: CLINIC | Age: 65
End: 2025-05-06
Payer: OTHER GOVERNMENT

## 2025-05-06 NOTE — TELEPHONE ENCOUNTER
----- Message from Med Assistant Morrison sent at 5/6/2025  8:29 AM CDT -----  Patient needs someone to call her back asa this morning. She needs a referral sent for her eye doctor, her appointment is today. States the provider is telling her they have not received the referral and she hasn't gotten any calls back. [phone]

## 2025-05-06 NOTE — TELEPHONE ENCOUNTER
Referral has not been worked yet.  Kitty Zelaya is contacting [facility] to obtain auth.

## 2025-05-06 NOTE — TELEPHONE ENCOUNTER
Spoke with patient and let her know Kitty Zelaya got the [facility] authorization. She is at the appointment now with her eye doctor.

## 2025-05-27 DIAGNOSIS — E55.9 VITAMIN D DEFICIENCY: ICD-10-CM

## 2025-05-27 RX ORDER — ERGOCALCIFEROL 1.25 MG/1
50000 CAPSULE ORAL
Qty: 4 CAPSULE | Refills: 11 | Status: SHIPPED | OUTPATIENT
Start: 2025-05-27 | End: 2026-05-27

## 2025-05-27 NOTE — TELEPHONE ENCOUNTER
No care due was identified.  [facility] Embedded Care Due Messages. Reference number: [id number].   5/27/2025 10:35:36 AM CDT

## 2025-05-29 ENCOUNTER — TELEPHONE (OUTPATIENT)
Dept: FAMILY MEDICINE | Facility: CLINIC | Age: 65
End: 2025-05-29
Payer: OTHER GOVERNMENT

## 2025-05-29 NOTE — TELEPHONE ENCOUNTER
Called patient to advise her medication was sent to her pharmacy on 5/27/25. Verbal understanding was expressed.

## 2025-05-29 NOTE — TELEPHONE ENCOUNTER
----- Message from Savita sent at 5/29/2025  8:48 AM CDT -----  The patient changed her pharmacy to [facility] [address]. She said she saw it in her [facility] that Dr. Reyez was going to call in a prescription for Vit D. She suppose to be taking Vit D?  There is no prescription at her pharmacy. Please call pt's # [phone] GH

## 2025-06-19 ENCOUNTER — OFFICE VISIT (OUTPATIENT)
Dept: FAMILY MEDICINE | Facility: CLINIC | Age: 65
End: 2025-06-19
Payer: OTHER GOVERNMENT

## 2025-06-19 VITALS
SYSTOLIC BLOOD PRESSURE: 128 MMHG | BODY MASS INDEX: 33.26 KG/M2 | HEIGHT: 67 IN | WEIGHT: 211.88 LBS | DIASTOLIC BLOOD PRESSURE: 88 MMHG

## 2025-06-19 DIAGNOSIS — G89.29 CHRONIC PAIN OF RIGHT KNEE: ICD-10-CM

## 2025-06-19 DIAGNOSIS — E55.9 VITAMIN D DEFICIENCY: ICD-10-CM

## 2025-06-19 DIAGNOSIS — R07.89 ATYPICAL CHEST PAIN: Primary | ICD-10-CM

## 2025-06-19 DIAGNOSIS — M25.561 CHRONIC PAIN OF RIGHT KNEE: ICD-10-CM

## 2025-06-19 PROCEDURE — 99214 OFFICE O/P EST MOD 30 MIN: CPT | Mod: S$PBB,,, | Performed by: FAMILY MEDICINE

## 2025-06-19 PROCEDURE — 99213 OFFICE O/P EST LOW 20 MIN: CPT | Mod: PBBFAC,PN | Performed by: FAMILY MEDICINE

## 2025-06-19 PROCEDURE — 99999 PR PBB SHADOW E&M-EST. PATIENT-LVL III: CPT | Mod: PBBFAC,,, | Performed by: FAMILY MEDICINE

## 2025-06-19 RX ORDER — ERGOCALCIFEROL 1.25 MG/1
50000 CAPSULE ORAL
Qty: 4 CAPSULE | Refills: 11 | Status: SHIPPED | OUTPATIENT
Start: 2025-06-19 | End: 2026-06-19
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
## 2025-06-23 ENCOUNTER — TELEPHONE (OUTPATIENT)
Dept: FAMILY MEDICINE | Facility: CLINIC | Age: 65
End: 2025-06-23
Payer: OTHER GOVERNMENT

## 2025-06-23 DIAGNOSIS — H53.8 BLURRED VISION: Primary | ICD-10-CM

## 2025-06-23 NOTE — TELEPHONE ENCOUNTER
----- Message from Amparo sent at 6/23/2025 10:58 AM CDT -----  Pt called to get a approval for corina for her Dr. Sumner's office referral.    [phone]

## 2025-06-23 NOTE — TELEPHONE ENCOUNTER
I called the patient in regarding her referral. Reaching out to mrs jf melo in regards to the status of the referral.

## 2025-06-23 NOTE — TELEPHONE ENCOUNTER
Copied from CRM #[id number]. Topic: General Inquiry - Return Call  >> Jun 23, 2025 12:48 PM Roberta wrote:  Type:  Needs Medical Advice    Who Called: pt   Would the patient rather a call back or a response via [facility]? Call back   Best Call Back Number: [phone]   Additional Information: need  auth sent to finger fax [phone]

## 2025-06-23 NOTE — TELEPHONE ENCOUNTER
----- Message from Amparo sent at 6/23/2025  2:16 PM CDT -----  Pt stated that she needs another PA or referral from [place] to go to Dr. Baxter office for her eyes. She stated that she went to him before.    [phone]

## 2025-06-25 ENCOUNTER — TELEPHONE (OUTPATIENT)
Dept: ORTHOPEDICS | Facility: CLINIC | Age: 65
End: 2025-06-25
Payer: OTHER GOVERNMENT
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
## 2025-06-30 ENCOUNTER — HOSPITAL ENCOUNTER (OUTPATIENT)
Dept: CARDIOLOGY | Facility: CLINIC | Age: 65
Discharge: HOME | End: 2025-06-30
Attending: FAMILY MEDICINE
Payer: OTHER GOVERNMENT

## 2025-06-30 DIAGNOSIS — R07.89 ATYPICAL CHEST PAIN: ICD-10-CM

## 2025-07-10 ENCOUNTER — TELEPHONE (OUTPATIENT)
Dept: FAMILY MEDICINE | Facility: CLINIC | Age: 65
End: 2025-07-10
Payer: OTHER GOVERNMENT

## 2025-07-11 ENCOUNTER — HOSPITAL ENCOUNTER (EMERGENCY)
Facility: HOSPITAL | Age: 65
Discharge: HOME | End: 2025-07-11
Attending: EMERGENCY MEDICINE
Payer: OTHER GOVERNMENT

## 2025-07-11 VITALS
BODY MASS INDEX: 32.02 KG/M2 | OXYGEN SATURATION: 99 % | TEMPERATURE: 99 F | HEART RATE: 57 BPM | DIASTOLIC BLOOD PRESSURE: 66 MMHG | WEIGHT: 204 LBS | HEIGHT: 67 IN | SYSTOLIC BLOOD PRESSURE: 149 MMHG | RESPIRATION RATE: 16 BRPM

## 2025-07-11 DIAGNOSIS — R53.83 FATIGUE, UNSPECIFIED TYPE: Primary | ICD-10-CM

## 2025-07-11 DIAGNOSIS — I10 HYPERTENSION: ICD-10-CM

## 2025-07-11 DIAGNOSIS — R07.9 CHEST PAIN: ICD-10-CM

## 2025-07-11 LAB
ABSOLUTE EOSINOPHIL (SMH): 0.05 K/UL
ABSOLUTE MONOCYTE (SMH): 0.25 K/UL (ref 0.3–1)
ABSOLUTE NEUTROPHIL COUNT (SMH): 1.5 K/UL (ref 1.8–7.7)
ALBUMIN SERPL-MCNC: 4.1 G/DL (ref 3.5–5.2)
ALP SERPL-CCNC: 40 UNIT/L (ref 55–135)
ALT SERPL-CCNC: 16 UNIT/L (ref 10–44)
ANION GAP (SMH): 4 MMOL/L (ref 8–16)
AST SERPL-CCNC: 16 UNIT/L (ref 10–40)
BASOPHILS # BLD AUTO: 0.03 K/UL
BASOPHILS NFR BLD AUTO: 0.9 %
BILIRUB SERPL-MCNC: 0.5 MG/DL (ref 0.1–1)
BNP SERPL-MCNC: 53 PG/ML
BUN SERPL-MCNC: 9 MG/DL (ref 8–23)
CALCIUM SERPL-MCNC: 8.9 MG/DL (ref 8.7–10.5)
CHLORIDE SERPL-SCNC: 105 MMOL/L (ref 95–110)
CO2 SERPL-SCNC: 27 MMOL/L (ref 23–29)
CREAT SERPL-MCNC: 0.7 MG/DL (ref 0.5–1.4)
ERYTHROCYTE [DISTWIDTH] IN BLOOD BY AUTOMATED COUNT: 16.8 % (ref 11.5–14.5)
GFR SERPLBLD CREATININE-BSD FMLA CKD-EPI: >60 ML/MIN/1.73/M2
GLUCOSE SERPL-MCNC: 115 MG/DL (ref 70–110)
HCT VFR BLD AUTO: 41.9 % (ref 37–48.5)
HCV AB SERPL QL IA: NORMAL
HGB BLD-MCNC: 12.5 GM/DL (ref 12–16)
HIV 1+2 AB+HIV1 P24 AG SERPL QL IA: NORMAL
IMM GRANULOCYTES # BLD AUTO: 0.01 K/UL (ref 0–0.04)
IMM GRANULOCYTES NFR BLD AUTO: 0.3 % (ref 0–0.5)
LYMPHOCYTES # BLD AUTO: 1.38 K/UL (ref 1–4.8)
MAGNESIUM SERPL-MCNC: 2.1 MG/DL (ref 1.6–2.6)
MCH RBC QN AUTO: 20.2 PG (ref 27–31)
MCHC RBC AUTO-ENTMCNC: 29.8 G/DL (ref 32–36)
MCV RBC AUTO: 68 FL (ref 82–98)
NUCLEATED RBC (/100WBC) (SMH): 0 /100 WBC
PLATELET # BLD AUTO: 281 K/UL (ref 150–450)
PMV BLD AUTO: 10.6 FL (ref 9.2–12.9)
POTASSIUM SERPL-SCNC: 4.1 MMOL/L (ref 3.5–5.1)
PROT SERPL-MCNC: 7.3 GM/DL (ref 6–8.4)
RBC # BLD AUTO: 6.18 M/UL (ref 4–5.4)
RELATIVE EOSINOPHIL (SMH): 1.6 % (ref 0–8)
RELATIVE LYMPHOCYTE (SMH): 43.5 % (ref 18–48)
RELATIVE MONOCYTE (SMH): 7.9 % (ref 4–15)
RELATIVE NEUTROPHIL (SMH): 45.8 % (ref 38–73)
SODIUM SERPL-SCNC: 136 MMOL/L (ref 136–145)
TROPONIN HIGH SENSITIVE (SMH): 6 PG/ML
TROPONIN HIGH SENSITIVE (SMH): 6.5 PG/ML
WBC # BLD AUTO: 3.17 K/UL (ref 3.9–12.7)

## 2025-07-11 PROCEDURE — 93005 ELECTROCARDIOGRAM TRACING: CPT | Performed by: GENERAL PRACTICE

## 2025-07-11 PROCEDURE — 93010 ELECTROCARDIOGRAM REPORT: CPT | Mod: ,,, | Performed by: GENERAL PRACTICE

## 2025-07-11 PROCEDURE — 99285 EMERGENCY DEPT VISIT HI MDM: CPT | Mod: 25

## 2025-07-11 PROCEDURE — 84484 ASSAY OF TROPONIN QUANT: CPT | Performed by: EMERGENCY MEDICINE

## 2025-07-11 PROCEDURE — 85025 COMPLETE CBC W/AUTO DIFF WBC: CPT | Performed by: EMERGENCY MEDICINE

## 2025-07-11 PROCEDURE — 83880 ASSAY OF NATRIURETIC PEPTIDE: CPT | Performed by: EMERGENCY MEDICINE

## 2025-07-11 PROCEDURE — 87389 HIV-1 AG W/HIV-1&-2 AB AG IA: CPT | Performed by: EMERGENCY MEDICINE

## 2025-07-11 PROCEDURE — 86803 HEPATITIS C AB TEST: CPT | Performed by: EMERGENCY MEDICINE

## 2025-07-11 PROCEDURE — 83735 ASSAY OF MAGNESIUM: CPT | Performed by: EMERGENCY MEDICINE

## 2025-07-11 PROCEDURE — 82040 ASSAY OF SERUM ALBUMIN: CPT | Performed by: EMERGENCY MEDICINE

## 2025-07-11 NOTE — ED PROVIDER NOTES
"Encounter Date: 7/11/2025       History     Chief Complaint   Patient presents with    Palpitations     Generalized weakness and palpitations for "a few days"  Pt reports chest pain yesterday; denies today.  Pt remembered symptoms started 7/6.  Pt reports recent events unable to recall     Patient presents complaining of palpitations.  Patient states she has had feeling of general malaise and fatigue and had some chest discomfort yesterday.  Today she continues to feel malaise and fatigue with some palpitations but no chest discomfort.  Patient has history of CABG.      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 Parrino [facility]    cyst removed from right breast      LASER LAPAROSCOPY      TUBAL LIGATION       Family History   Problem Relation Name Age of Onset    Arrhythmia Mother      Breast cancer Sister 3 45    Asthma Sister 3     Cancer Maternal Grandmother cervical     Diabetes Mellitus Neg Hx       Social History[1]  Review of Systems   All other systems reviewed and are negative.      Physical Exam     Initial Vitals   BP Pulse Resp Temp SpO2   07/11/25 1201 07/11/25 1201 07/11/25 1201 07/11/25 1201 07/11/25 1202   139/84 73 18 98.6 °F (37 °C) 99 %      MAP       --                Physical Exam    Nursing note and vitals reviewed.  Constitutional: She appears well-developed and well-nourished.   Pleasant, polite   HENT:   Head: Normocephalic and atraumatic. " Mouth/Throat: Oropharynx is clear and moist.   Eyes: EOM are normal.   Neck: Neck supple.   Normal range of motion.  Cardiovascular:  Normal rate, regular rhythm, normal heart sounds and intact distal pulses.           Pulmonary/Chest: Breath sounds normal. No respiratory distress.   Abdominal: Abdomen is soft.   Musculoskeletal:         General: Normal range of motion.      Cervical back: Normal range of motion and neck supple.     Neurological: She is alert and oriented to person, place, and time. She has normal strength.   Skin: Skin is warm and dry. Capillary refill takes less than 2 seconds.   Psychiatric: She has a normal mood and affect. Her behavior is normal. Judgment and thought content normal.         ED Course   Procedures  Labs Reviewed   COMPREHENSIVE METABOLIC PANEL - Abnormal       Result Value    Sodium 136      Potassium 4.1      Chloride 105      CO2 27      Glucose 115 (*)     BUN 9      Creatinine 0.7      Calcium 8.9      Protein Total 7.3      Albumin 4.1      Bilirubin Total 0.5      ALP 40 (*)     AST 16      ALT 16      Anion Gap 4 (*)     eGFR >60     CBC WITH DIFFERENTIAL - Abnormal    WBC 3.17 (*)     RBC 6.18 (*)     Hgb 12.5      Hct 41.9      MCV 68 (*)     MCH 20.2 (*)     MCHC 29.8 (*)     RDW 16.8 (*)     Platelet Count 281      MPV 10.6      Nucleated RBC 0      Neut % 45.8      Lymph % 43.5      Mono % 7.9      Eos % 1.6      Basophil % 0.9      Imm Grans % 0.3      Neut # 1.5 (*)     Lymph # 1.38      Mono # 0.25 (*)     Eos # 0.05      Baso # 0.03      Imm Grans # 0.01     MAGNESIUM - Normal    Magnesium 2.1     TROPONIN I HIGH SENSITIVITY - Normal    Troponin High Sensitive 6.0     TROPONIN I HIGH SENSITIVITY - Normal    Troponin High Sensitive 6.5     B-TYPE NATRIURETIC PEPTIDE - Normal    BNP 53     CBC W/ AUTO DIFFERENTIAL    Narrative:     The following orders were created for panel order CBC auto differential.  Procedure                               Abnormality          Status                     ---------                               -----------         ------                     CBC with Differential[[id number]]       Abnormal            Final result                 Please view results for these tests on the individual orders.   HEPATITIS C ANTIBODY   HEP C VIRUS HOLD SPECIMEN   HIV 1 / 2 ANTIBODY   HIV VIRUS CONFIRMATION HOLD SPECIMEN        ECG Results              EKG 12-lead (In process)        Collection Time Result Time QRS Duration OHS QTC Calculation    07/11/25 11:52:59 07/11/25 12:29:00 82 444                     In process by Interface, Lab In [facility] (07/11/25 12:29:08)                   Narrative:    Test Reason : I10,    Vent. Rate :  75 BPM     Atrial Rate :  75 BPM     P-R Int : 164 ms          QRS Dur :  82 ms      QT Int : 398 ms       P-R-T Axes :  53  78  84 degrees    QTcB Int : 444 ms    Normal sinus rhythm  Nonspecific ST abnormality  Abnormal ECG  No previous ECGs available    Referred By: AAAREFERRAL SELF           Confirmed By:                                   Imaging Results              X-Ray Chest AP Portable (Final result)  Result time 07/11/25 12:54:24      Final result by Hamzah Barrow DO (07/11/25 12:54:24)                   Impression:      No acute cardiopulmonary abnormality.      Electronically signed by: Hamzah Barrow  Date:    07/11/2025  Time:    12:54               Narrative:    EXAMINATION:  XR CHEST AP PORTABLE    CLINICAL HISTORY:  Chest Pain;    FINDINGS:  Portable chest with comparison chest x-ray 03/26/2025.  Normal cardiomediastinal silhouette.Lungs are clear. Pulmonary vasculature is normal. No acute osseous abnormality.                                       Medications - No data to display  Medical Decision Making  Patient appears in no acute distress    Considerations include but are not limited to acute kidney injury, dehydration, electrolyte abnormalities, acute coronary syndrome, unstable angina    Patient's troponin  negative x2, chest x-ray without acute cardiopulmonary process EKGs regular rate and rhythm without ST changes, T-wave changes are similar to previous.  Patient is feeling improved in the emergency department.  I find no findings to suggest cardiac event yesterday or today.  Patient with significant history of CABG.  I offered patient admission to the hospital for further cardiac evaluation however she is not want to stay.  I did advise patient she should return if she has any recurrence of symptoms.  She does have a cardiologist Dr. Vasquez who she can follow up with.  Patient will be discharged in stable condition.  Detailed return precautions discussed.  Shared decision-making well received.    Amount and/or Complexity of Data Reviewed  Labs: ordered.  Radiology: ordered.                                      Clinical Impression:  Final diagnoses:  [I10] Hypertension  [R07.9] Chest pain  [R53.83] Fatigue, unspecified type (Primary)          ED Disposition Condition    Discharge Stable          ED Prescriptions    None       Follow-up Information       Follow up With Specialties Details Why Contact Info    Justen Reyez MD Family Medicine Schedule an appointment as soon as possible for a visit   [address]  [address]  [place] [facility] [address]  [phone]      Antonio Vasquez MD Cardiology, Interventional Cardiology Schedule an appointment as soon as possible for a visit in 2 days  [address]  [address]  [facility]  [place] [facility] [address]  [phone]                     [1]   Social History  Tobacco Use    Smoking status: Former     Current packs/day: 0.00     Types: Cigarettes     Quit date:      Years since quittin.5    Smokeless tobacco: Never   Substance Use Topics    Alcohol use: Yes     Alcohol/week: 1.0 standard drink of alcohol     Types: 1 Glasses of wine per week     Comment: daily    Drug use: No        Souleymane Cisse MD  [id number]

## 2025-07-13 LAB
OHS QRS DURATION: 82 MS
OHS QTC CALCULATION: 444 MS

## 2025-07-14 LAB — HOLD SPECIMEN: NORMAL
